# Patient Record
Sex: FEMALE | Race: BLACK OR AFRICAN AMERICAN | NOT HISPANIC OR LATINO | Employment: OTHER | URBAN - METROPOLITAN AREA
[De-identification: names, ages, dates, MRNs, and addresses within clinical notes are randomized per-mention and may not be internally consistent; named-entity substitution may affect disease eponyms.]

---

## 2017-02-07 ENCOUNTER — HISTORICAL (OUTPATIENT)
Dept: RADIOLOGY | Facility: HOSPITAL | Age: 57
End: 2017-02-07

## 2017-06-12 ENCOUNTER — HISTORICAL (OUTPATIENT)
Dept: ADMINISTRATIVE | Facility: HOSPITAL | Age: 57
End: 2017-06-12

## 2017-10-10 ENCOUNTER — HISTORICAL (OUTPATIENT)
Dept: LAB | Facility: HOSPITAL | Age: 57
End: 2017-10-10

## 2017-10-10 LAB
ABS NEUT (OLG): 7 X10(3)/MCL (ref 2.1–9.2)
BASOPHILS # BLD AUTO: 0.1 X10(3)/MCL
BASOPHILS NFR BLD AUTO: 1 % (ref 0–2)
BUN SERPL-MCNC: 9.3 MG/DL (ref 7–18)
CALCIUM SERPL-MCNC: 9.5 MG/DL (ref 8.5–10.1)
CHLORIDE SERPL-SCNC: 104 MMOL/L (ref 98–107)
CO2 SERPL-SCNC: 28 MMOL/L (ref 21–32)
CREAT SERPL-MCNC: 0.77 MG/DL (ref 0.6–1.3)
EOSINOPHIL # BLD AUTO: 0.1 X10(3)/MCL
EOSINOPHIL NFR BLD AUTO: 0 %
ERYTHROCYTE [DISTWIDTH] IN BLOOD BY AUTOMATED COUNT: 13.9 % (ref 11.5–17)
GLUCOSE SERPL-MCNC: 142 MG/DL (ref 74–106)
HCT VFR BLD AUTO: 40.8 % (ref 37–47)
HGB BLD-MCNC: 13.3 GM/DL (ref 12–16)
LYMPHOCYTES # BLD AUTO: 2.4 X10(3)/MCL
LYMPHOCYTES NFR BLD AUTO: 24 % (ref 13–40)
MCH RBC QN AUTO: 27 PG (ref 27–31)
MCHC RBC AUTO-ENTMCNC: 32.5 GM/DL (ref 33–36)
MCV RBC AUTO: 83 FL (ref 80–94)
MONOCYTES # BLD AUTO: 0.5 X10(3)/MCL
MONOCYTES NFR BLD AUTO: 5 % (ref 2–11)
NEUTROPHILS # BLD AUTO: 7 X10(3)/MCL (ref 2.1–9.2)
NEUTROPHILS NFR BLD AUTO: 69 % (ref 47–80)
PLATELET # BLD AUTO: 380 X10(3)/MCL (ref 130–400)
PMV BLD AUTO: 7.5 FL (ref 7.4–10.4)
POTASSIUM SERPL-SCNC: 3.5 MMOL/L (ref 3.5–5.1)
RBC # BLD AUTO: 4.92 X10(6)/MCL (ref 4.2–5.4)
SODIUM SERPL-SCNC: 143 MMOL/L (ref 136–145)
WBC # SPEC AUTO: 10.1 X10(3)/MCL (ref 4.5–11.5)

## 2018-01-10 ENCOUNTER — HISTORICAL (OUTPATIENT)
Dept: LAB | Facility: HOSPITAL | Age: 58
End: 2018-01-10

## 2018-01-10 ENCOUNTER — HISTORICAL (OUTPATIENT)
Dept: RADIOLOGY | Facility: HOSPITAL | Age: 58
End: 2018-01-10

## 2018-01-10 LAB
BUN SERPL-MCNC: 12 MG/DL (ref 7–18)
CALCIUM SERPL-MCNC: 9 MG/DL (ref 8.5–10.1)
CHLORIDE SERPL-SCNC: 106 MMOL/L (ref 98–107)
CO2 SERPL-SCNC: 28.2 MMOL/L (ref 21–32)
CREAT SERPL-MCNC: 0.74 MG/DL (ref 0.6–1.3)
GLUCOSE SERPL-MCNC: 158 MG/DL (ref 74–106)
POTASSIUM SERPL-SCNC: 3.7 MMOL/L (ref 3.5–5.1)
SODIUM SERPL-SCNC: 142 MMOL/L (ref 136–145)

## 2018-02-12 ENCOUNTER — HISTORICAL (OUTPATIENT)
Dept: RADIOLOGY | Facility: HOSPITAL | Age: 58
End: 2018-02-12

## 2018-06-15 ENCOUNTER — HISTORICAL (OUTPATIENT)
Dept: RADIOLOGY | Facility: HOSPITAL | Age: 58
End: 2018-06-15

## 2021-04-28 ENCOUNTER — HISTORICAL (OUTPATIENT)
Dept: RADIOLOGY | Facility: HOSPITAL | Age: 61
End: 2021-04-28

## 2022-04-08 ENCOUNTER — HISTORICAL (OUTPATIENT)
Dept: RADIOLOGY | Facility: HOSPITAL | Age: 62
End: 2022-04-08

## 2022-04-08 ENCOUNTER — HISTORICAL (OUTPATIENT)
Dept: ADMINISTRATIVE | Facility: HOSPITAL | Age: 62
End: 2022-04-08

## 2022-04-11 ENCOUNTER — HISTORICAL (OUTPATIENT)
Dept: ADMINISTRATIVE | Facility: HOSPITAL | Age: 62
End: 2022-04-11

## 2022-04-25 VITALS
SYSTOLIC BLOOD PRESSURE: 144 MMHG | DIASTOLIC BLOOD PRESSURE: 85 MMHG | OXYGEN SATURATION: 99 % | HEIGHT: 67 IN | BODY MASS INDEX: 42.6 KG/M2 | WEIGHT: 271.38 LBS

## 2023-08-05 ENCOUNTER — HOSPITAL ENCOUNTER (EMERGENCY)
Facility: HOSPITAL | Age: 63
Discharge: HOME | End: 2023-08-05
Attending: STUDENT IN AN ORGANIZED HEALTH CARE EDUCATION/TRAINING PROGRAM

## 2023-08-05 VITALS
RESPIRATION RATE: 15 BRPM | DIASTOLIC BLOOD PRESSURE: 71 MMHG | TEMPERATURE: 98 F | HEIGHT: 67 IN | SYSTOLIC BLOOD PRESSURE: 138 MMHG | WEIGHT: 200 LBS | BODY MASS INDEX: 31.39 KG/M2 | OXYGEN SATURATION: 99 % | HEART RATE: 82 BPM

## 2023-08-05 DIAGNOSIS — E86.0 DEHYDRATION: Primary | ICD-10-CM

## 2023-08-05 DIAGNOSIS — R53.1 WEAKNESS: ICD-10-CM

## 2023-08-05 LAB
ALBUMIN SERPL-MCNC: 3.8 G/DL (ref 3.4–4.8)
ALBUMIN/GLOB SERPL: 1.1 RATIO (ref 1.1–2)
ALP SERPL-CCNC: 153 UNIT/L (ref 40–150)
ALT SERPL-CCNC: 32 UNIT/L (ref 0–55)
APPEARANCE UR: CLEAR
AST SERPL-CCNC: 45 UNIT/L (ref 5–34)
BACTERIA #/AREA URNS AUTO: NORMAL /HPF
BASOPHILS # BLD AUTO: 0.04 X10(3)/MCL
BASOPHILS NFR BLD AUTO: 0.4 %
BILIRUB UR QL STRIP.AUTO: NEGATIVE
BILIRUBIN DIRECT+TOT PNL SERPL-MCNC: 0.6 MG/DL
BNP BLD-MCNC: 43.6 PG/ML
BUN SERPL-MCNC: 17.8 MG/DL (ref 9.8–20.1)
CALCIUM SERPL-MCNC: 9.2 MG/DL (ref 8.4–10.2)
CHLORIDE SERPL-SCNC: 106 MMOL/L (ref 98–107)
CO2 SERPL-SCNC: 28 MMOL/L (ref 23–31)
COLOR UR: YELLOW
CREAT SERPL-MCNC: 1.12 MG/DL (ref 0.55–1.02)
EOSINOPHIL # BLD AUTO: 0.2 X10(3)/MCL (ref 0–0.9)
EOSINOPHIL NFR BLD AUTO: 2 %
ERYTHROCYTE [DISTWIDTH] IN BLOOD BY AUTOMATED COUNT: 12.8 % (ref 11.5–17)
FLUAV AG UPPER RESP QL IA.RAPID: NOT DETECTED
FLUBV AG UPPER RESP QL IA.RAPID: NOT DETECTED
GFR SERPLBLD CREATININE-BSD FMLA CKD-EPI: 55 MLS/MIN/1.73/M2
GLOBULIN SER-MCNC: 3.5 GM/DL (ref 2.4–3.5)
GLUCOSE SERPL-MCNC: 146 MG/DL (ref 82–115)
GLUCOSE UR QL STRIP.AUTO: NEGATIVE
HCT VFR BLD AUTO: 39.7 % (ref 37–47)
HGB BLD-MCNC: 11.8 G/DL (ref 12–16)
IMM GRANULOCYTES # BLD AUTO: 0.02 X10(3)/MCL (ref 0–0.04)
IMM GRANULOCYTES NFR BLD AUTO: 0.2 %
INR PPP: 1
KETONES UR QL STRIP.AUTO: NEGATIVE
LEUKOCYTE ESTERASE UR QL STRIP.AUTO: NEGATIVE
LYMPHOCYTES # BLD AUTO: 2.09 X10(3)/MCL (ref 0.6–4.6)
LYMPHOCYTES NFR BLD AUTO: 20.4 %
MCH RBC QN AUTO: 27.4 PG (ref 27–31)
MCHC RBC AUTO-ENTMCNC: 29.7 G/DL (ref 33–36)
MCV RBC AUTO: 92.1 FL (ref 80–94)
MONOCYTES # BLD AUTO: 0.53 X10(3)/MCL (ref 0.1–1.3)
MONOCYTES NFR BLD AUTO: 5.2 %
NEUTROPHILS # BLD AUTO: 7.37 X10(3)/MCL (ref 2.1–9.2)
NEUTROPHILS NFR BLD AUTO: 71.8 %
NITRITE UR QL STRIP.AUTO: NEGATIVE
PH UR STRIP.AUTO: 7 [PH]
PLATELET # BLD AUTO: 380 X10(3)/MCL (ref 130–400)
PMV BLD AUTO: 9.6 FL (ref 7.4–10.4)
POC CARDIAC TROPONIN I: 0 NG/ML (ref 0–0.08)
POTASSIUM SERPL-SCNC: 3.3 MMOL/L (ref 3.5–5.1)
PROT SERPL-MCNC: 7.3 GM/DL (ref 5.8–7.6)
PROT UR QL STRIP.AUTO: NEGATIVE
PROTHROMBIN TIME: 10.2 SECONDS (ref 12.5–14.5)
RBC # BLD AUTO: 4.31 X10(6)/MCL (ref 4.2–5.4)
RBC #/AREA URNS AUTO: NORMAL /HPF
RBC UR QL AUTO: ABNORMAL
SAMPLE: NORMAL
SARS-COV-2 RNA RESP QL NAA+PROBE: NOT DETECTED
SODIUM SERPL-SCNC: 145 MMOL/L (ref 136–145)
SP GR UR STRIP.AUTO: 1.02
SQUAMOUS #/AREA URNS AUTO: NORMAL /HPF
UROBILINOGEN UR STRIP-ACNC: 0.2
WBC # SPEC AUTO: 10.25 X10(3)/MCL (ref 4.5–11.5)
WBC #/AREA URNS AUTO: NORMAL /HPF

## 2023-08-05 PROCEDURE — 84484 ASSAY OF TROPONIN QUANT: CPT

## 2023-08-05 PROCEDURE — 85610 PROTHROMBIN TIME: CPT | Performed by: STUDENT IN AN ORGANIZED HEALTH CARE EDUCATION/TRAINING PROGRAM

## 2023-08-05 PROCEDURE — 99285 EMERGENCY DEPT VISIT HI MDM: CPT | Mod: 25

## 2023-08-05 PROCEDURE — 25000003 PHARM REV CODE 250: Performed by: STUDENT IN AN ORGANIZED HEALTH CARE EDUCATION/TRAINING PROGRAM

## 2023-08-05 PROCEDURE — 80053 COMPREHEN METABOLIC PANEL: CPT | Performed by: STUDENT IN AN ORGANIZED HEALTH CARE EDUCATION/TRAINING PROGRAM

## 2023-08-05 PROCEDURE — 93005 ELECTROCARDIOGRAM TRACING: CPT

## 2023-08-05 PROCEDURE — 93010 EKG 12-LEAD: ICD-10-PCS | Mod: ,,, | Performed by: STUDENT IN AN ORGANIZED HEALTH CARE EDUCATION/TRAINING PROGRAM

## 2023-08-05 PROCEDURE — 96360 HYDRATION IV INFUSION INIT: CPT

## 2023-08-05 PROCEDURE — 93010 ELECTROCARDIOGRAM REPORT: CPT | Mod: ,,, | Performed by: STUDENT IN AN ORGANIZED HEALTH CARE EDUCATION/TRAINING PROGRAM

## 2023-08-05 PROCEDURE — 81001 URINALYSIS AUTO W/SCOPE: CPT | Performed by: STUDENT IN AN ORGANIZED HEALTH CARE EDUCATION/TRAINING PROGRAM

## 2023-08-05 PROCEDURE — 83880 ASSAY OF NATRIURETIC PEPTIDE: CPT | Performed by: STUDENT IN AN ORGANIZED HEALTH CARE EDUCATION/TRAINING PROGRAM

## 2023-08-05 PROCEDURE — 85025 COMPLETE CBC W/AUTO DIFF WBC: CPT | Performed by: STUDENT IN AN ORGANIZED HEALTH CARE EDUCATION/TRAINING PROGRAM

## 2023-08-05 PROCEDURE — 0240U COVID/FLU A&B PCR: CPT | Performed by: STUDENT IN AN ORGANIZED HEALTH CARE EDUCATION/TRAINING PROGRAM

## 2023-08-05 RX ADMIN — SODIUM CHLORIDE 1000 ML: 9 INJECTION, SOLUTION INTRAVENOUS at 02:08

## 2023-08-05 NOTE — ED NOTES
Resting comfortably - spouse at bedside- cm sr no ectopy- free of nv- resps even/unlabored.  Pt voided for ua - results pending.

## 2023-08-05 NOTE — Clinical Note
"Barbara"Rylee Alvarez was seen and treated in our emergency department on 8/5/2023.  She may return to work on 08/08/2023.       If you have any questions or concerns, please don't hesitate to call.      Ingrid Olivera MD"

## 2023-08-05 NOTE — ED PROVIDER NOTES
Encounter Date: 8/5/2023       History     Chief Complaint   Patient presents with    Weakness     Pt was shopping at [employer], states she got really hot and then got weak; pt states it was hot in [employer]; also reports nausea at this time; cbg with ems 130      63-year-old female presents to EMS after having an episode in [employer] just prior to arrival states that she got really hot and weak while standing talking to a friend.  CBG with   States that it was very hot in the store.  Patient also states that she cut grass on yesterday may not have drank as much water as she thinks she should have.  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  Had mild nausea initially currently denies any nausea vomiting chest pain shortness of breath or any other symptoms.      Review of patient's allergies indicates:  No Known Allergies  History reviewed. No pertinent past medical history.  History reviewed. No pertinent surgical history.  History reviewed. No pertinent family history.     Review of Systems   Constitutional:  Negative for fever.   HENT:  Negative for sore throat.    Respiratory:  Negative for shortness of breath.    Cardiovascular:  Negative for chest pain.   Gastrointestinal:  Negative for nausea.   Genitourinary:  Negative for dysuria.   Musculoskeletal:  Negative for back pain.   Skin:  Negative for rash.   Neurological:  Positive for weakness.        Neg except as stated   Hematological:  Does not bruise/bleed easily.       Physical Exam     Initial Vitals [08/05/23 1156]   BP Pulse Resp Temp SpO2   103/70 70 18 98 °F (36.7 °C) 95 %      MAP       --         Physical Exam    Nursing note and vitals reviewed.  Constitutional: She appears well-developed and well-nourished.   HENT:   Head: Normocephalic.   Eyes: EOM are normal. Pupils are equal, round, and reactive to light.   Neck:   Normal range of motion.  Cardiovascular:   Normal rate, regular rhythm, normal heart sounds, intact distal pulses and normal pulses.           Pulmonary/Chest: Breath sounds normal. No respiratory distress.   Abdominal: Abdomen is soft. Bowel sounds are normal. There is no abdominal tenderness.   Musculoskeletal:         General: Normal range of motion.      Cervical back: Normal range of motion.     Neurological: She is alert and oriented to person, place, and time. GCS score is 15. GCS eye subscore is 4. GCS verbal subscore is 5. GCS motor subscore is 6.   Skin: Skin is warm. Capillary refill takes less than 2 seconds.   Psychiatric: She has a normal mood and affect.         ED Course   Procedures  Labs Reviewed   COMPREHENSIVE METABOLIC PANEL - Abnormal; Notable for the following components:       Result Value    Potassium Level 3.3 (*)     Glucose Level 146 (*)     Creatinine 1.12 (*)     Alkaline Phosphatase 153 (*)     Aspartate Aminotransferase 45 (*)     All other components within normal limits   URINALYSIS, REFLEX TO URINE CULTURE - Abnormal; Notable for the following components:    Blood, UA Trace-Intact (*)     All other components within normal limits   PROTIME-INR - Abnormal; Notable for the following components:    PT 10.2 (*)     All other components within normal limits   CBC WITH DIFFERENTIAL - Abnormal; Notable for the following components:    Hgb 11.8 (*)     MCHC 29.7 (*)     All other components within normal limits   B-TYPE NATRIURETIC PEPTIDE - Normal   COVID/FLU A&B PCR - Normal    Narrative:     The Xpert Xpress SARS-CoV-2/FLU/RSV plus is a rapid, multiplexed real-time PCR test intended for the simultaneous qualitative detection and differentiation of SARS-CoV-2, Influenza A, Influenza B, and respiratory syncytial virus (RSV) viral RNA in either nasopharyngeal swab or nasal swab specimens.         URINALYSIS, MICROSCOPIC - Normal   CBC W/ AUTO DIFFERENTIAL    Narrative:     The following orders were created for panel order CBC Auto  Differential.  Procedure                               Abnormality         Status                     ---------                               -----------         ------                     CBC with Differential[[id number]]        Abnormal            Final result                 Please view results for these tests on the individual orders.   TROPONIN ISTAT   POCT TROPONIN          Imaging Results              X-Ray Chest 1 View (Final result)  Result time 08/05/23 13:14:40      Final result by Sergio Jackson MD (08/05/23 13:14:40)                   Impression:      No acute cardiopulmonary abnormality.      Electronically signed by: Sergio Jackson MD  Date:    08/05/2023  Time:    13:14               Narrative:    EXAMINATION:  Single view chest radiograph.    CLINICAL HISTORY:  Weakness    TECHNIQUE:  Single view of the chest.    COMPARISON:  Chest radiograph 02/12/2018.    FINDINGS:  The lungs are clear without consolidation or effusion.  There is no pneumothorax.  The cardiac silhouette is normal in size.  There is no acute osseous abnormality.                                    X-Rays:   Independently Interpreted Readings:   Chest X-Ray: No acute abnormalities.     Medications   sodium chloride 0.9% bolus 1,000 mL 1,000 mL (1,000 mLs Intravenous New Bag 8/5/23 1400)     Medical Decision Making:   History:   Old Records Summarized: records from clinic visits, records from previous admission(s) and records from another hospital.  Differential Diagnosis:   Dehydration, electrolyte imbalance, ACS, arrhythmia, vasovagal episode  Independently Interpreted Test(s):   I have ordered and independently interpreted X-rays - see prior notes.  I have ordered and independently interpreted EKG Reading(s) - see prior notes  Clinical Tests:   Lab Tests: Ordered and Reviewed       <> Summary of Lab: Creatinine elevated 1.12  K 3.3  Radiological Study: Ordered and Reviewed  Medical Tests: Ordered and Reviewed  ED Management:  Patient  given 1 L normal saline IV fluids with improvement of symptoms.  Peers at symptoms likely due to dehydration due to decreased fluid intake coupled with patient being exposed to heat the past few days to a week.  Patient is asymptomatic on time of examined at discharge no apparent distress vital signs stable.  Advised to increase p.o. hydration with electrolyte rich fluids and stay out of the heat as much as possible.  Follow up with PCP  ER precautions reviewed  The patient is resting comfortably in no acute distress.  She is hemodynamically stable and is without objective evidence for acute process requiring urgent intervention or hospitalization. I provided counseling to patient with regard to condition, the treatment plan, specific conditions for return, and the importance of follow up. Detailed written and verbal instructions provided to patient and he expressed a verbal understanding of the discharge instructions and overall management plan. Reiterated the importance of medication administration and safety and advised patient to follow up with primary care provider in 3-5 days or sooner if needed.  Answered questions at this time. The patient is stable for discharge.                ED Course as of 08/05/23 1450   Sat Aug 05, 2023   1257 EKG normal sinus rhythm sinus arrhythmia rate 60 LVH nonspecific T-wave abnormalities prolonged QT no STEMI [MG]      ED Course User Index  [MG] Ingrid Olivera MD                 Clinical Impression:   Final diagnoses:  [R53.1] Weakness  [E86.0] Dehydration (Primary)        ED Disposition Condition    Discharge Stable          ED Prescriptions    None       Follow-up Information       Follow up With Specialties Details Why Contact Info    PCP  Schedule an appointment as soon as possible for a visit                Ingrid Olivera MD  08/05/23 3127

## 2024-04-18 DIAGNOSIS — Z12.31 SCREENING MAMMOGRAM FOR BREAST CANCER: ICD-10-CM

## 2024-04-18 DIAGNOSIS — N95.9 MENOPAUSAL PROBLEM: Primary | ICD-10-CM

## 2024-04-26 ENCOUNTER — HOSPITAL ENCOUNTER (OUTPATIENT)
Dept: RADIOLOGY | Facility: HOSPITAL | Age: 64
Discharge: HOME | End: 2024-04-26
Attending: NURSE PRACTITIONER
Payer: MEDICARE

## 2024-04-26 DIAGNOSIS — N95.9 MENOPAUSAL PROBLEM: ICD-10-CM

## 2024-04-26 PROCEDURE — 77067 SCR MAMMO BI INCL CAD: CPT | Mod: 26,,, | Performed by: RADIOLOGY

## 2024-04-26 PROCEDURE — 77080 DXA BONE DENSITY AXIAL: CPT | Mod: TC

## 2024-04-26 PROCEDURE — 77067 SCR MAMMO BI INCL CAD: CPT | Mod: TC

## 2024-04-26 PROCEDURE — 77063 BREAST TOMOSYNTHESIS BI: CPT | Mod: 26,,, | Performed by: RADIOLOGY

## 2024-06-10 ENCOUNTER — HOSPITAL ENCOUNTER (OUTPATIENT)
Dept: RADIOLOGY | Facility: HOSPITAL | Age: 64
Discharge: HOME | End: 2024-06-10
Attending: NURSE PRACTITIONER
Payer: MEDICARE

## 2024-06-10 DIAGNOSIS — R92.8 ABNORMAL MAMMOGRAM: ICD-10-CM

## 2024-06-10 DIAGNOSIS — R92.8 ABNORMAL FINDINGS ON DIAGNOSTIC IMAGING OF BREAST: Primary | ICD-10-CM

## 2024-06-10 PROCEDURE — 77061 BREAST TOMOSYNTHESIS UNI: CPT | Mod: 26,LT,, | Performed by: RADIOLOGY

## 2024-06-10 PROCEDURE — 77065 DX MAMMO INCL CAD UNI: CPT | Mod: TC,LT

## 2024-06-10 PROCEDURE — 76641 ULTRASOUND BREAST COMPLETE: CPT | Mod: TC,LT

## 2024-06-10 PROCEDURE — 77061 BREAST TOMOSYNTHESIS UNI: CPT | Mod: TC,LT

## 2024-06-10 PROCEDURE — 77065 DX MAMMO INCL CAD UNI: CPT | Mod: 26,LT,, | Performed by: RADIOLOGY

## 2024-06-20 ENCOUNTER — HOSPITAL ENCOUNTER (OUTPATIENT)
Dept: RADIOLOGY | Facility: HOSPITAL | Age: 64
Discharge: HOME | End: 2024-06-20
Attending: NURSE PRACTITIONER
Payer: MEDICARE

## 2024-06-20 DIAGNOSIS — R92.8 ABNORMAL FINDINGS ON DIAGNOSTIC IMAGING OF BREAST: Primary | ICD-10-CM

## 2024-06-20 DIAGNOSIS — R92.8 ABNORMAL FINDINGS ON DIAGNOSTIC IMAGING OF BREAST: ICD-10-CM

## 2024-06-20 PROCEDURE — 27000550 US BREAST BIOPSY WITH IMAGING 1ST SITE LEFT

## 2024-06-20 PROCEDURE — 77061 BREAST TOMOSYNTHESIS UNI: CPT | Mod: TC,LT

## 2024-06-25 ENCOUNTER — TELEPHONE (OUTPATIENT)
Dept: RADIOLOGY | Facility: HOSPITAL | Age: 64
End: 2024-06-25
Payer: MEDICARE

## 2024-06-25 DIAGNOSIS — D05.12 DUCTAL CARCINOMA IN SITU (DCIS) OF LEFT BREAST: Primary | ICD-10-CM

## 2024-07-08 ENCOUNTER — TELEPHONE (OUTPATIENT)
Dept: SURGERY | Facility: CLINIC | Age: 64
End: 2024-07-08
Payer: MEDICARE

## 2024-07-08 NOTE — H&P (VIEW-ONLY)
[facility] Breast Surg  Breast Surgical Oncology  New Patient Office Visit - H&P      Referring Provider: Antoinette Elizondo   PCP: Antoinette Elizondo NP     Chief Complaint:   Chief Complaint   Patient presents with    Breast Cancer     Patient reports no breast related concerns        Subjective:       HPI:  Barbara Alvarez is a 64 y.o. female who presents on 7/9/2024 for evaluation of newly diagnosed left  breast cancer. Biopsy of left breast mass positive for DCIS    A detailed patient history was obtained and reviewed. She currently denies any breast issues including rashes, redness, pain, swelling, nipple discharge, or new lumps/masses.    Of note, pt developed a blood clot after her first pregnancy and was taken off birth control medications. She had an IVC filter placed and is currently taking Plavix. She see Antoinette Elizondo NP for management of her Plavix.    MG breast density: Category B (scattered areas of fibroglandular tissue)     Imaging:  Bilateral screening mammogram 4/29/2024:   IMPRESSION: INCOMPLETE: NEEDS ADDITIONAL IMAGING EVALUATION  The oval mass in the 12:00 left breast needs further evaluation.      Left diagnostic mammogram 6/10/2024:   IMPRESSION: SUSPICIOUS OF MALIGNANCY  Left breast 11:00 10 cm from the nipple posterior depth 0.7 cm irregular hypoechoic mass with angular/spiculated margins is suspicious for malignancy. BIRADS 4    Left breast US guided biopsy 7/1/2024:   IMPRESSION: ULTRASOUND GUIDED BIOPSY MALIGNANT   Ultrasound guided biopsy of the 0.7 cm mass in the left breast at 11:00 posterior depth 10 cm from the nipple with placement of a HydroMARK T3 coil-shaped clip was successful with no apparent post procedure complications.    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  Pathology results are concordant with mammography and ultrasound findings.           Pathology:    Left breast  mass at 11:00 10 CMFN, US biopsy: DCIS, G2, predominately cribriform and papillary types, 0.4 cm in greatest dimension. ER 90-95%, OR 70-75%    OB/GYN History:  Age at Menarche Onset: 11  Menopausal Status: postmenopausal, LMP: at age 49  Hysterectomy/Oophorectomy: hysterectomy without BSO, at age 22  Hormonal birth control (duration): Yes OCP (estrogen/progesterone). started at age 15 and stopped at age 18 due to blood clot  Pregnancy History:   Age at first live birth: 18  Hormone Replacement Therapy: No, none  Patient did not breast feed.  Patient denies nipple discharge.   Patient denies to previous breast biopsy.   Patient denies to a personal history of breast cancer.        Other Relevant History:  Prior thoracic RT: none  Genetic testing: No  Ashkenazi Evangelical descent: No    Family History:  Mother- Cancer, unknown type  mUncle- Cancer, bone  mCousin- Cancer, bone  Brother- Cancer, colon  Son- Cancer, brain    Past History:  Past Medical History:   Diagnosis Date    Depression     Hypertension         Past Surgical History:   Procedure Laterality Date    APPENDECTOMY      CHOLECYSTECTOMY      HYSTERECTOMY      KNEE SURGERY Left     NECK SURGERY      PLACEMENT, INFERIOR VENA CAVA FILTER Left     SHOULDER ARTHROSCOPY W/ ROTATOR CUFF REPAIR Left     STENT PLACEMENT/PRIOR TO CALIN Left 2015    STENT PLACEMENT/PRIOR TO CALIN Left         Social History     Socioeconomic History    Marital status:    Tobacco Use    Smoking status: Never    Smokeless tobacco: Never   Substance and Sexual Activity    Alcohol use: Never    Drug use: Never     Social Determinants of Health     Financial Resource Strain: Low Risk  (2023)    Received from [employer] [facility] and Its Subsidiaries and Affiliates    Overall Financial Resource Strain (CARDIA)     Difficulty of Paying Living Expenses: Not hard at all   Food Insecurity: No Food Insecurity (2023)    Received from [employer]  [facility] and Its Subsidiaries and Affiliates    Hunger Vital Sign     Worried About Running Out of Food in the Last Year: Never true     Ran Out of Food in the Last Year: Never true        Body mass index is 35.05 kg/m².     Allergy/Medications:   Review of patient's allergies indicates:   Allergen Reactions    Amitriptyline Other (See Comments)     Other Reaction(s): muscle tightening    MUSCLE WEAKNESS    Adhesive Rash    Enoxaparin Rash    Methocarbamol Rash and Other (See Comments)     MUSCLE WEAKNESS    Rivaroxaban Rash    Warfarin Rash          Current Outpatient Medications:     acetaminophen-codeine 300-30mg (TYLENOL #3) 300-30 mg Tab, Take 1 tablet by mouth., Disp: , Rfl:     amLODIPine (NORVASC) 10 MG tablet, Take 10 mg by mouth., Disp: , Rfl:     buPROPion (WELLBUTRIN XL) 300 MG 24 hr tablet, TAKE 1 TABLET BY MOUTH EVERY MORNING FOR DEPRESSION, Disp: , Rfl:     clopidogreL (PLAVIX) 75 mg tablet, , Disp: , Rfl:     furosemide (LASIX) 40 MG tablet, , Disp: , Rfl:     irbesartan (AVAPRO) 300 MG tablet, Take 300 mg by mouth., Disp: , Rfl:          Review of Systems:  Review of Systems   Constitutional:  Positive for malaise/fatigue. Negative for chills, fever and weight loss.   HENT:  Negative for sore throat.    Eyes:  Negative for blurred vision and double vision.   Respiratory:  Negative for cough and shortness of breath.    Cardiovascular:  Negative for chest pain, palpitations and leg swelling.   Gastrointestinal:  Positive for heartburn. Negative for abdominal pain, constipation, diarrhea, nausea and vomiting.   Genitourinary:  Negative for dysuria, flank pain, frequency, hematuria and urgency.   Musculoskeletal:  Negative for back pain, joint pain and myalgias.        Left knee pain   Neurological:  Negative for dizziness and headaches.   Endo/Heme/Allergies:  Bruises/bleeds easily.   Psychiatric/Behavioral:  Negative for depression. The patient is not nervous/anxious.   "         Objective:     Vitals:  Blood pressure (!) 146/85, pulse 76, temperature 97.5 °F (36.4 °C), temperature source Oral, resp. rate 18, height 5' 7" (1.702 m), weight 101.5 kg (223 lb 12.8 oz), SpO2 98%.      Physical Exam:  General: The patient is awake, alert and oriented times three. The patient is well nourished and in no acute distress.   Neck: There is no evidence of palpable cervical, supraclavicular or axillary adenopathy. The neck is supple. The thyroid is not enlarged.   Musculoskeletal: The patient has a normal range of motion of her bilateral upper extremities.   Chest: Examination of the chest wall fails to reveal any obvious abnormalities. The lungs are clear to auscultation bilaterally without rales, rhonchi, or wheezing.   Cardiovascular: The heart has a regular rate and rhythm without murmurs, gallops or rubs.  Breast:  Right: Examination of right breast fails to reveal any dominant masses or areas of significant focal nodularity. The nipple is everted without evidence of discharge. There is no skin dimpling with movement of the pectoralis. There are no significant skin changes overlying the breast.   Left: Examination of the left breast fails to reveal any dominant masses or areas of significant focal nodularity. The nipple is everted without evidence of discharge. There is no skin dimpling with movement of the pectoralis. There are no significant skin changes overlying the breast.  Abdomen: The abdomen is soft, flat, nontender and nondistended with no palpable masses or organomegaly.  Integumentary: no rashes or skin lesions present  Neurologic: cranial nerves intact, no signs of peripheral neurological deficit, motor/sensory function intact        Assessment and Discussion:      Cancer Staging   Ductal carcinoma in situ (DCIS) of left breast  Staging form: Breast, AJCC 8th Edition  - Clinical stage from 7/9/2024: Stage 0 (cTis (DCIS), cN0, cM0, ER+, WY+, HER2: Not Assessed) - Signed by " Deirdre Welch MD on 7/9/2024        Encounter Diagnoses   Name Primary?    Ductal carcinoma in situ (DCIS) of left breast         I explained the pathology report of DCIS (ductal carcinoma in-situ) to the patient. This is a noninvasive breast cancer, which means it is still confined to the ducts and not invading the surrounding tissue. We then discussed the need for LOCAL TREATMENT and ADJUVANT HORMONAL TREATMENT.    The LOCAL TREATMENT consists of the options of 1) partial mastectomy (lumpectomy) with radiation or 2) total mastectomy alone.    I then explained to her the procedure of lumpectomy. The rationale for lumpectomy and the need to obtain clear margins, which includes a minimal acceptable margin of at least 2 mm in all directions, were specifically addressed. The necessity of adding radiation following lumpectomy with good margins was explained. The logistics of radiation were discussed at length. The patient will be referred to Radiation Oncology to discuss further details of radiation.    The general operative procedure of mastectomy, the skin sparing nature, and attempts to preserve underlying muscle with a mastectomy were discussed. The options of primary reconstruction following a mastectomy include either implant reconstruction or tissue transfer type of reconstruction. The pros and cons of both of these reconstructive methods were addressed. Both of these are generally performed in stages, and a second operation is usually necessary before the final completion of the reconstructive process. I also explained to the patient that the reconstructive options are generally, by law, required to be covered by insurance and would be considered part of a cancer operation and not a cosmetic operation.     Sometimes also a reduction or mastopexy is performed on the opposite side to achieve a better match, and this operation by itself is also considered part of the cancer treatment.     I informed her of the  complications which include surgical site infections, hematoma or seroma requiring operation, necrosis of nipple-areola and/or mastectomy flaps requiring debridement or hyperbaric therapy, unplanned re-operations, and delay in adjuvant treatments.     Specifically, we went over the local recurrence rate and survival rates with mastectomy and breast conserving therapy, and the indications for postmastectomy radiation in certain subset of patients. If she were to choose the option of mastectomy, then a lymph node mapping procedure would need to be done at that same time. We discussed SLN biopsy in detail and its rationale. If any invasive cancer component is found on lumpectomy, then we would need to stage her lymph nodes with sentinel lymph node mapping. The sentinel lymph node biopsy is generally performed at the time of mastectomy given the breast tissue will be removed and would make lymph node biopsy essentially not possible. If not performed at the time of mastectomy and invasive component found on final pathology an axillary lymph node dissection is generally performed. MagTrace can be used at the time of mastectomy to allow for delayed sentinel lymph node biopsy.     Following this, I have also briefly discussed with her the rationale for ADJUVANT HORMONAL THERAPY. I do not think she will need any chemotherapy, as DCIS is a noninvasive cancer. However, if invasive component is present in the final pathology, details would be needed to determined prior to recommendations for or against chemotherapy.         Plan:       Agree with note written by General Surgery Resident, Moise Amezcua MD, [facility] General Surgery PGY-1.    Problem List Items Addressed This Visit          Oncology    Ductal carcinoma in situ (DCIS) of left breast    Current Assessment & Plan     Surgery - Left breast partial mastectomy/lumpectomy with seed localization  Tentative Date: 7/22/2024  Preop - CBC, CMP, EKG  Surgical instructions and  information were discussed in detail         Relevant Orders    Case Request Operating Room: MASTECTOMY, PARTIAL - LEFT (Completed)    Comprehensive metabolic panel (Completed)    CBC auto differential (Completed)    EKG 12-lead         All of questions were answered    Deirdre Welch MD  Breast Surgical Oncology     OFFICE VISIT CODING:    Non-face-to-face time included:  Yes Preparing to see the patient such as reviewing the patient record  Yes Obtaining and reviewing separately obtained history  Yes Independently interpreting results  Yes Documenting clinical information in electronic health record  Yes Ordering appropriate medications  Yes Ordering appropriate tests  Yes Ordering appropriate procedures (including follow-up)  Yes Referring and communicating with other health care professionals (not separately reported)  Yes Care Coordination (not separately reported)    Face-to-face time included:  Yes Performing a medically necessary appropriate history, examination, and/or evaluation  Yes Communicating results to the patient/family/caregiver  Yes Counseling and educating the patient/family/caregiver  Yes Answering patient/family/caregiver questions    Total Time: 65 minutes    Total time includes both face-to-face and non-face-to-face time personally spent by myself on the day of the visit.

## 2024-07-08 NOTE — PROGRESS NOTES
[facility] Breast Surg  Breast Surgical Oncology  New Patient Office Visit - H&P      Referring Provider: Antoinette Elizondo   PCP: Antoinette Elizondo NP     Chief Complaint:   Chief Complaint   Patient presents with    Breast Cancer     Patient reports no breast related concerns        Subjective:       HPI:  Barbara Alvarez is a 64 y.o. female who presents on 7/9/2024 for evaluation of newly diagnosed left  breast cancer. Biopsy of left breast mass positive for DCIS    A detailed patient history was obtained and reviewed. She currently denies any breast issues including rashes, redness, pain, swelling, nipple discharge, or new lumps/masses.    Of note, pt developed a blood clot after her first pregnancy and was taken off birth control medications. She had an IVC filter placed and is currently taking Plavix. She see Antoinette Elizondo NP for management of her Plavix.    MG breast density: Category B (scattered areas of fibroglandular tissue)     Imaging:  Bilateral screening mammogram 4/29/2024:   IMPRESSION: INCOMPLETE: NEEDS ADDITIONAL IMAGING EVALUATION  The oval mass in the 12:00 left breast needs further evaluation.      Left diagnostic mammogram 6/10/2024:   IMPRESSION: SUSPICIOUS OF MALIGNANCY  Left breast 11:00 10 cm from the nipple posterior depth 0.7 cm irregular hypoechoic mass with angular/spiculated margins is suspicious for malignancy. BIRADS 4    Left breast US guided biopsy 7/1/2024:   IMPRESSION: ULTRASOUND GUIDED BIOPSY MALIGNANT   Ultrasound guided biopsy of the 0.7 cm mass in the left breast at 11:00 posterior depth 10 cm from the nipple with placement of a HydroMARK T3 coil-shaped clip was successful with no apparent post procedure complications.    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  Pathology results are concordant with mammography and ultrasound findings.           Pathology:    Left breast  mass at 11:00 10 CMFN, US biopsy: DCIS, G2, predominately cribriform and papillary types, 0.4 cm in greatest dimension. ER 90-95%, SC 70-75%    OB/GYN History:  Age at Menarche Onset: 11  Menopausal Status: postmenopausal, LMP: at age 49  Hysterectomy/Oophorectomy: hysterectomy without BSO, at age 22  Hormonal birth control (duration): Yes OCP (estrogen/progesterone). started at age 15 and stopped at age 18 due to blood clot  Pregnancy History:   Age at first live birth: 18  Hormone Replacement Therapy: No, none  Patient did not breast feed.  Patient denies nipple discharge.   Patient denies to previous breast biopsy.   Patient denies to a personal history of breast cancer.        Other Relevant History:  Prior thoracic RT: none  Genetic testing: No  Ashkenazi Yazidi descent: No    Family History:  Mother- Cancer, unknown type  mUncle- Cancer, bone  mCousin- Cancer, bone  Brother- Cancer, colon  Son- Cancer, brain    Past History:  Past Medical History:   Diagnosis Date    Depression     Hypertension         Past Surgical History:   Procedure Laterality Date    APPENDECTOMY      CHOLECYSTECTOMY      HYSTERECTOMY      KNEE SURGERY Left     NECK SURGERY      PLACEMENT, INFERIOR VENA CAVA FILTER Left     SHOULDER ARTHROSCOPY W/ ROTATOR CUFF REPAIR Left     STENT PLACEMENT/PRIOR TO CALIN Left 2015    STENT PLACEMENT/PRIOR TO CALIN Left         Social History     Socioeconomic History    Marital status:    Tobacco Use    Smoking status: Never    Smokeless tobacco: Never   Substance and Sexual Activity    Alcohol use: Never    Drug use: Never     Social Determinants of Health     Financial Resource Strain: Low Risk  (2023)    Received from [employer] [facility]    Overall Financial Resource Strain (CARDIA)     Difficulty of Paying Living Expenses: Not hard at all   Food Insecurity: No Food Insecurity (2023)    Received from [employer]  [facility]    Hunger Vital Sign     Worried About Running Out of Food in the Last Year: Never true     Ran Out of Food in the Last Year: Never true        Body mass index is 35.05 kg/m².     Allergy/Medications:   Review of patient's allergies indicates:   Allergen Reactions    Amitriptyline Other (See Comments)     Other Reaction(s): muscle tightening    MUSCLE WEAKNESS    Adhesive Rash    Enoxaparin Rash    Methocarbamol Rash and Other (See Comments)     MUSCLE WEAKNESS    Rivaroxaban Rash    Warfarin Rash          Current Outpatient Medications:     acetaminophen-codeine 300-30mg (TYLENOL #3) 300-30 mg Tab, Take 1 tablet by mouth., Disp: , Rfl:     amLODIPine (NORVASC) 10 MG tablet, Take 10 mg by mouth., Disp: , Rfl:     buPROPion (WELLBUTRIN XL) 300 MG 24 hr tablet, TAKE 1 TABLET BY MOUTH EVERY MORNING FOR DEPRESSION, Disp: , Rfl:     clopidogreL (PLAVIX) 75 mg tablet, , Disp: , Rfl:     furosemide (LASIX) 40 MG tablet, , Disp: , Rfl:     irbesartan (AVAPRO) 300 MG tablet, Take 300 mg by mouth., Disp: , Rfl:          Review of Systems:  Review of Systems   Constitutional:  Positive for malaise/fatigue. Negative for chills, fever and weight loss.   HENT:  Negative for sore throat.    Eyes:  Negative for blurred vision and double vision.   Respiratory:  Negative for cough and shortness of breath.    Cardiovascular:  Negative for chest pain, palpitations and leg swelling.   Gastrointestinal:  Positive for heartburn. Negative for abdominal pain, constipation, diarrhea, nausea and vomiting.   Genitourinary:  Negative for dysuria, flank pain, frequency, hematuria and urgency.   Musculoskeletal:  Negative for back pain, joint pain and myalgias.        Left knee pain   Neurological:  Negative for dizziness and headaches.   Endo/Heme/Allergies:  Bruises/bleeds easily.   Psychiatric/Behavioral:  Negative for depression. The patient is not nervous/anxious.   "         Objective:     Vitals:  Blood pressure (!) 146/85, pulse 76, temperature 97.5 °F (36.4 °C), temperature source Oral, resp. rate 18, height 5' 7" (1.702 m), weight 101.5 kg (223 lb 12.8 oz), SpO2 98%.      Physical Exam:  General: The patient is awake, alert and oriented times three. The patient is well nourished and in no acute distress.   Neck: There is no evidence of palpable cervical, supraclavicular or axillary adenopathy. The neck is supple. The thyroid is not enlarged.   Musculoskeletal: The patient has a normal range of motion of her bilateral upper extremities.   Chest: Examination of the chest wall fails to reveal any obvious abnormalities. The lungs are clear to auscultation bilaterally without rales, rhonchi, or wheezing.   Cardiovascular: The heart has a regular rate and rhythm without murmurs, gallops or rubs.  Breast:  Right: Examination of right breast fails to reveal any dominant masses or areas of significant focal nodularity. The nipple is everted without evidence of discharge. There is no skin dimpling with movement of the pectoralis. There are no significant skin changes overlying the breast.   Left: Examination of the left breast fails to reveal any dominant masses or areas of significant focal nodularity. The nipple is everted without evidence of discharge. There is no skin dimpling with movement of the pectoralis. There are no significant skin changes overlying the breast.  Abdomen: The abdomen is soft, flat, nontender and nondistended with no palpable masses or organomegaly.  Integumentary: no rashes or skin lesions present  Neurologic: cranial nerves intact, no signs of peripheral neurological deficit, motor/sensory function intact        Assessment and Discussion:      Cancer Staging   Ductal carcinoma in situ (DCIS) of left breast  Staging form: Breast, AJCC 8th Edition  - Clinical stage from 7/9/2024: Stage 0 (cTis (DCIS), cN0, cM0, ER+, IA+, HER2: Not Assessed) - Signed by " Deirdre Welch MD on 7/9/2024        Encounter Diagnoses   Name Primary?    Ductal carcinoma in situ (DCIS) of left breast         I explained the pathology report of DCIS (ductal carcinoma in-situ) to the patient. This is a noninvasive breast cancer, which means it is still confined to the ducts and not invading the surrounding tissue. We then discussed the need for LOCAL TREATMENT and ADJUVANT HORMONAL TREATMENT.    The LOCAL TREATMENT consists of the options of 1) partial mastectomy (lumpectomy) with radiation or 2) total mastectomy alone.    I then explained to her the procedure of lumpectomy. The rationale for lumpectomy and the need to obtain clear margins, which includes a minimal acceptable margin of at least 2 mm in all directions, were specifically addressed. The necessity of adding radiation following lumpectomy with good margins was explained. The logistics of radiation were discussed at length. The patient will be referred to Radiation Oncology to discuss further details of radiation.    The general operative procedure of mastectomy, the skin sparing nature, and attempts to preserve underlying muscle with a mastectomy were discussed. The options of primary reconstruction following a mastectomy include either implant reconstruction or tissue transfer type of reconstruction. The pros and cons of both of these reconstructive methods were addressed. Both of these are generally performed in stages, and a second operation is usually necessary before the final completion of the reconstructive process. I also explained to the patient that the reconstructive options are generally, by law, required to be covered by insurance and would be considered part of a cancer operation and not a cosmetic operation.     Sometimes also a reduction or mastopexy is performed on the opposite side to achieve a better match, and this operation by itself is also considered part of the cancer treatment.     I informed her of the  complications which include surgical site infections, hematoma or seroma requiring operation, necrosis of nipple-areola and/or mastectomy flaps requiring debridement or hyperbaric therapy, unplanned re-operations, and delay in adjuvant treatments.     Specifically, we went over the local recurrence rate and survival rates with mastectomy and breast conserving therapy, and the indications for postmastectomy radiation in certain subset of patients. If she were to choose the option of mastectomy, then a lymph node mapping procedure would need to be done at that same time. We discussed SLN biopsy in detail and its rationale. If any invasive cancer component is found on lumpectomy, then we would need to stage her lymph nodes with sentinel lymph node mapping. The sentinel lymph node biopsy is generally performed at the time of mastectomy given the breast tissue will be removed and would make lymph node biopsy essentially not possible. If not performed at the time of mastectomy and invasive component found on final pathology an axillary lymph node dissection is generally performed. MagTrace can be used at the time of mastectomy to allow for delayed sentinel lymph node biopsy.     Following this, I have also briefly discussed with her the rationale for ADJUVANT HORMONAL THERAPY. I do not think she will need any chemotherapy, as DCIS is a noninvasive cancer. However, if invasive component is present in the final pathology, details would be needed to determined prior to recommendations for or against chemotherapy.         Plan:       Agree with note written by General Surgery Resident, Moise Amezcua MD, [facility] General Surgery PGY-1.    Problem List Items Addressed This Visit          Oncology    Ductal carcinoma in situ (DCIS) of left breast    Current Assessment & Plan     Surgery - Left breast partial mastectomy/lumpectomy with seed localization  Tentative Date: 7/22/2024  Preop - CBC, CMP, EKG  Surgical instructions and  information were discussed in detail         Relevant Orders    Case Request Operating Room: MASTECTOMY, PARTIAL - LEFT (Completed)    Comprehensive metabolic panel (Completed)    CBC auto differential (Completed)    EKG 12-lead         All of questions were answered    Deirdre Welch MD  Breast Surgical Oncology     OFFICE VISIT CODING:    Non-face-to-face time included:  Yes Preparing to see the patient such as reviewing the patient record  Yes Obtaining and reviewing separately obtained history  Yes Independently interpreting results  Yes Documenting clinical information in electronic health record  Yes Ordering appropriate medications  Yes Ordering appropriate tests  Yes Ordering appropriate procedures (including follow-up)  Yes Referring and communicating with other health care professionals (not separately reported)  Yes Care Coordination (not separately reported)    Face-to-face time included:  Yes Performing a medically necessary appropriate history, examination, and/or evaluation  Yes Communicating results to the patient/family/caregiver  Yes Counseling and educating the patient/family/caregiver  Yes Answering patient/family/caregiver questions    Total Time: 65 minutes    Total time includes both face-to-face and non-face-to-face time personally spent by myself on the day of the visit.

## 2024-07-09 ENCOUNTER — LAB VISIT (OUTPATIENT)
Dept: LAB | Facility: HOSPITAL | Age: 64
End: 2024-07-09
Attending: SURGERY
Payer: MEDICARE

## 2024-07-09 ENCOUNTER — OFFICE VISIT (OUTPATIENT)
Dept: SURGERY | Facility: CLINIC | Age: 64
End: 2024-07-09
Payer: MEDICARE

## 2024-07-09 VITALS
TEMPERATURE: 98 F | WEIGHT: 223.81 LBS | BODY MASS INDEX: 35.13 KG/M2 | DIASTOLIC BLOOD PRESSURE: 85 MMHG | RESPIRATION RATE: 18 BRPM | HEIGHT: 67 IN | OXYGEN SATURATION: 98 % | HEART RATE: 76 BPM | SYSTOLIC BLOOD PRESSURE: 146 MMHG

## 2024-07-09 DIAGNOSIS — D05.12 DUCTAL CARCINOMA IN SITU (DCIS) OF LEFT BREAST: ICD-10-CM

## 2024-07-09 LAB
ALBUMIN SERPL-MCNC: 4 G/DL (ref 3.4–4.8)
ALBUMIN/GLOB SERPL: 1.2 RATIO (ref 1.1–2)
ALP SERPL-CCNC: 128 UNIT/L (ref 40–150)
ALT SERPL-CCNC: 11 UNIT/L (ref 0–55)
ANION GAP SERPL CALC-SCNC: 10 MEQ/L
AST SERPL-CCNC: 12 UNIT/L (ref 5–34)
BASOPHILS # BLD AUTO: 0.04 X10(3)/MCL
BASOPHILS NFR BLD AUTO: 0.5 %
BILIRUB SERPL-MCNC: 0.8 MG/DL
BUN SERPL-MCNC: 15.3 MG/DL (ref 9.8–20.1)
CALCIUM SERPL-MCNC: 9.6 MG/DL (ref 8.4–10.2)
CHLORIDE SERPL-SCNC: 108 MMOL/L (ref 98–107)
CO2 SERPL-SCNC: 25 MMOL/L (ref 23–31)
CREAT SERPL-MCNC: 0.91 MG/DL (ref 0.55–1.02)
CREAT/UREA NIT SERPL: 17
EOSINOPHIL # BLD AUTO: 0.13 X10(3)/MCL (ref 0–0.9)
EOSINOPHIL NFR BLD AUTO: 1.5 %
ERYTHROCYTE [DISTWIDTH] IN BLOOD BY AUTOMATED COUNT: 13.2 % (ref 11.5–17)
GFR SERPLBLD CREATININE-BSD FMLA CKD-EPI: >60 ML/MIN/1.73/M2
GLOBULIN SER-MCNC: 3.3 GM/DL (ref 2.4–3.5)
GLUCOSE SERPL-MCNC: 92 MG/DL (ref 82–115)
HCT VFR BLD AUTO: 40.4 % (ref 37–47)
HGB BLD-MCNC: 12.6 G/DL (ref 12–16)
IMM GRANULOCYTES # BLD AUTO: 0.02 X10(3)/MCL (ref 0–0.04)
IMM GRANULOCYTES NFR BLD AUTO: 0.2 %
LYMPHOCYTES # BLD AUTO: 2.08 X10(3)/MCL (ref 0.6–4.6)
LYMPHOCYTES NFR BLD AUTO: 24 %
MCH RBC QN AUTO: 28.3 PG (ref 27–31)
MCHC RBC AUTO-ENTMCNC: 31.2 G/DL (ref 33–36)
MCV RBC AUTO: 90.6 FL (ref 80–94)
MONOCYTES # BLD AUTO: 0.48 X10(3)/MCL (ref 0.1–1.3)
MONOCYTES NFR BLD AUTO: 5.5 %
NEUTROPHILS # BLD AUTO: 5.93 X10(3)/MCL (ref 2.1–9.2)
NEUTROPHILS NFR BLD AUTO: 68.3 %
NRBC BLD AUTO-RTO: 0 %
PLATELET # BLD AUTO: 396 X10(3)/MCL (ref 130–400)
PMV BLD AUTO: 9.7 FL (ref 7.4–10.4)
POTASSIUM SERPL-SCNC: 3.7 MMOL/L (ref 3.5–5.1)
PROT SERPL-MCNC: 7.3 GM/DL (ref 5.8–7.6)
RBC # BLD AUTO: 4.46 X10(6)/MCL (ref 4.2–5.4)
SODIUM SERPL-SCNC: 143 MMOL/L (ref 136–145)
WBC # BLD AUTO: 8.68 X10(3)/MCL (ref 4.5–11.5)

## 2024-07-09 PROCEDURE — 93010 ELECTROCARDIOGRAM REPORT: CPT | Mod: ,,, | Performed by: INTERNAL MEDICINE

## 2024-07-09 PROCEDURE — 36415 COLL VENOUS BLD VENIPUNCTURE: CPT

## 2024-07-09 PROCEDURE — 99999 PR PBB SHADOW E&M-EST. PATIENT-LVL V: CPT | Mod: PBBFAC,,, | Performed by: SURGERY

## 2024-07-09 PROCEDURE — 99215 OFFICE O/P EST HI 40 MIN: CPT | Mod: PBBFAC,25 | Performed by: SURGERY

## 2024-07-09 PROCEDURE — 93005 ELECTROCARDIOGRAM TRACING: CPT

## 2024-07-09 PROCEDURE — 99205 OFFICE O/P NEW HI 60 MIN: CPT | Mod: S$PBB,,, | Performed by: SURGERY

## 2024-07-09 RX ORDER — AMLODIPINE BESYLATE 10 MG/1
10 TABLET ORAL
COMMUNITY

## 2024-07-09 RX ORDER — BUPROPION HYDROCHLORIDE 300 MG/1
TABLET ORAL
COMMUNITY

## 2024-07-09 RX ORDER — SODIUM CHLORIDE, SODIUM LACTATE, POTASSIUM CHLORIDE, CALCIUM CHLORIDE 600; 310; 30; 20 MG/100ML; MG/100ML; MG/100ML; MG/100ML
INJECTION, SOLUTION INTRAVENOUS CONTINUOUS
OUTPATIENT
Start: 2024-07-09

## 2024-07-09 RX ORDER — ACETAMINOPHEN AND CODEINE PHOSPHATE 300; 30 MG/1; MG/1
1 TABLET ORAL
COMMUNITY
Start: 2024-05-25

## 2024-07-09 RX ORDER — CLOPIDOGREL BISULFATE 75 MG/1
TABLET ORAL
COMMUNITY
Start: 2024-01-19

## 2024-07-09 RX ORDER — FUROSEMIDE 40 MG/1
TABLET ORAL
COMMUNITY
Start: 2024-01-19

## 2024-07-09 RX ORDER — IRBESARTAN 300 MG/1
300 TABLET ORAL
COMMUNITY

## 2024-07-09 NOTE — ASSESSMENT & PLAN NOTE
Surgery - Left breast partial mastectomy/lumpectomy with seed localization  Tentative Date: 7/22/2024  Preop - CBC, CMP, EKG  Surgical instructions and information were discussed in detail

## 2024-07-10 NOTE — NURSING
Breast Nurse Navigator Note    Distress Screening Tool  Distress Score    Distress Score: 10 - Extreme Distress    Met with patient after consult with Dr. Welch. Patient's friend present during the consultation.  Referred to the following outside resources: [employer] and [facility]. Verbalized understanding that these resources may provide support throughout the course of her treatment. Patient amenable to receiving additional support and gave her permission to be referred to these organizations.   Breast Cancer Education: Breast Cancer Binder given to the patient.   All questions answers to the patient's satisfaction.

## 2024-07-11 LAB
OHS QRS DURATION: 96 MS
OHS QTC CALCULATION: 461 MS

## 2024-07-17 ENCOUNTER — HOSPITAL ENCOUNTER (OUTPATIENT)
Dept: RADIOLOGY | Facility: HOSPITAL | Age: 64
Discharge: HOME | End: 2024-07-17
Attending: SURGERY
Payer: MEDICARE

## 2024-07-17 DIAGNOSIS — D05.12 INTRADUCTAL CARCINOMA IN SITU OF LEFT BREAST: ICD-10-CM

## 2024-07-17 PROCEDURE — 77061 BREAST TOMOSYNTHESIS UNI: CPT | Mod: TC,LT

## 2024-07-17 PROCEDURE — A4648 IMPLANTABLE TISSUE MARKER: HCPCS

## 2024-07-17 PROCEDURE — 77065 DX MAMMO INCL CAD UNI: CPT | Mod: 26,LT,, | Performed by: RADIOLOGY

## 2024-07-17 PROCEDURE — 77061 BREAST TOMOSYNTHESIS UNI: CPT | Mod: 26,LT,, | Performed by: RADIOLOGY

## 2024-07-17 PROCEDURE — 19285 PERQ DEV BREAST 1ST US IMAG: CPT | Mod: LT,,, | Performed by: RADIOLOGY

## 2024-07-19 ENCOUNTER — ANESTHESIA EVENT (OUTPATIENT)
Dept: SURGERY | Facility: HOSPITAL | Age: 64
End: 2024-07-19
Payer: MEDICARE

## 2024-07-19 NOTE — ANESTHESIA PREPROCEDURE EVALUATION
"                                                                                                             07/19/2024  Barbara Alvarez is a 64 y.o., female.    Pre-operative evaluation for Procedure(s) (LRB):  MASTECTOMY, PARTIAL - ///left (Left)    Barbara Alvarez is a 64 y.o. female     Patient Active Problem List   Diagnosis    Ductal carcinoma in situ (DCIS) of left breast       Review of patient's allergies indicates:   Allergen Reactions    Amitriptyline Other (See Comments)     Other Reaction(s): muscle tightening    MUSCLE WEAKNESS    Adhesive Rash    Enoxaparin Rash    Methocarbamol Rash and Other (See Comments)     MUSCLE WEAKNESS    Rivaroxaban Rash    Warfarin Rash       No current facility-administered medications on file prior to encounter.     Current Outpatient Medications on File Prior to Encounter   Medication Sig Dispense Refill    acetaminophen-codeine 300-30mg (TYLENOL #3) 300-30 mg Tab Take 1 tablet by mouth as needed.      amLODIPine (NORVASC) 10 MG tablet Take 10 mg by mouth.      buPROPion (WELLBUTRIN XL) 300 MG 24 hr tablet TAKE 1 TABLET BY MOUTH EVERY MORNING FOR DEPRESSION      clopidogreL (PLAVIX) 75 mg tablet       irbesartan (AVAPRO) 300 MG tablet Take 300 mg by mouth.      furosemide (LASIX) 40 MG tablet          Past Surgical History:   Procedure Laterality Date    APPENDECTOMY      CHOLECYSTECTOMY      RENETTA FILTER PLACEMENT      2015    HYSTERECTOMY      KNEE SURGERY Left     NECK SURGERY      PLACEMENT, INFERIOR VENA CAVA FILTER Left     SHOULDER ARTHROSCOPY W/ ROTATOR CUFF REPAIR Left     STENT PLACEMENT/PRIOR TO CALIN Left 2015     CBC: No results for input(s): "WBC", "RBC", "HGB", "HCT", "PLT", "MCV", "MCH", "MCHC" in the last 72 hours.    [facility]: WNL    : No results for input(s): "NA", "K", "CL", "CO2", "BUN", "CREATININE", "GLU", "MG", "PHOS", "CALCIUM", "ALBUMIN", "PROT", "ALKPHOS", "ALT", "AST", "BILITOT" in the last 72 hours.    INR  No results for input(s): "PT", " ""INR", "PROTIME", "APTT" in the last 72 hours.    Diagnostic Studies:  CXR :  8/5/2023: NAPD    EKG 7/9/2024: NSR=66. Sinus arrhythm    2D Echo :  No results found for this or any previous visit.  Pre-op Assessment    I have reviewed the Patient Summary Reports.     I have reviewed the Nursing Notes. I have reviewed the NPO Status.   I have reviewed the Medications.     Review of Systems  Anesthesia Hx:  No problems with previous Anesthesia   History of prior surgery of interest to airway management or planning: cervical fusion. Previous anesthesia: General 11/14/2023: Lap Appy: EM, DL Mil2, Gr1x1, ET7; 2016 Cerv Spine Sx: ; Hysterectomy:; Knee Sx:; RCR; APPy;  GB with general anesthesia.       Airway issues documented on chart review include GETA     Denies Family Hx of Anesthesia complications.    Denies Personal Hx of Anesthesia complications.                    Social:  Non-Smoker, No Alcohol Use       Hematology/Oncology:  Hematology Normal                  Hematology Comments: DVT s/p [place] Filter on Plavix.    Current/Recent Cancer.  Breast    left          EENT/Dental:  EENT/Dental Normal           Cardiovascular:     Denies Pacemaker. Hypertension    Denies CABG/stent.                                     Pulmonary:  Pulmonary Normal                       Renal/:  Renal/ Normal                 Hepatic/GI:  Hepatic/GI Normal     Denies GERD.             Musculoskeletal:     2016S/p Cerv Sx :        Spine Disorders: cervical and lumbar Degenerative disease           Neurological:  Neurology Normal                                      Endocrine:  Endocrine Normal          Obesity / BMI > 30  Dermatological:  Skin Normal    Psych:  Psychiatric History                  Physical Exam  General: Cooperative, Alert and Oriented    Airway:  Mallampati: III / II  Mouth Opening: Normal  TM Distance: Normal  Tongue: Normal  Neck ROM: Normal ROM    Dental:  Intact, Dentures, Partial Dentures  Px denies any loose " teeth.  Chest/Lungs:  Normal Respiratory Rate    Heart:  Rate: Normal  Rhythm: Regular Rhythm    Abdomen:  Normal, Soft             Anesthesia Plan  Type of Anesthesia, risks & benefits discussed:    Anesthesia Type: Gen ETT, Gen Supraglottic Airway  Intra-op Monitoring Plan: Standard ASA Monitors  Post Op Pain Control Plan: multimodal analgesia  Induction:  IV  Airway Plan: Direct, Post-Induction  Informed Consent: Informed consent signed with the Patient and all parties understand the risks and agree with anesthesia plan.  All questions answered.   ASA Score: 3  Day of Surgery Review of History & Physical: H&P Update referred to the surgeon/provider.I have interviewed and examined the patient. I have reviewed the patient's H&P dated:     Ready For Surgery From Anesthesia Perspective.     .

## 2024-07-22 ENCOUNTER — ANESTHESIA (OUTPATIENT)
Dept: SURGERY | Facility: HOSPITAL | Age: 64
End: 2024-07-22
Payer: MEDICARE

## 2024-07-22 ENCOUNTER — HOSPITAL ENCOUNTER (OUTPATIENT)
Dept: RADIOLOGY | Facility: HOSPITAL | Age: 64
Discharge: HOME | End: 2024-07-22
Attending: SURGERY | Admitting: SURGERY
Payer: MEDICARE

## 2024-07-22 ENCOUNTER — HOSPITAL ENCOUNTER (OUTPATIENT)
Facility: HOSPITAL | Age: 64
Discharge: HOME | End: 2024-07-22
Attending: SURGERY | Admitting: SURGERY
Payer: MEDICARE

## 2024-07-22 DIAGNOSIS — D05.12 DUCTAL CARCINOMA IN SITU (DCIS) OF LEFT BREAST: ICD-10-CM

## 2024-07-22 DIAGNOSIS — R92.8 ABNORMAL MAMMOGRAM: ICD-10-CM

## 2024-07-22 PROCEDURE — 63600175 PHARM REV CODE 636 W HCPCS

## 2024-07-22 PROCEDURE — 71000039 HC RECOVERY, EACH ADD'L HOUR: Performed by: SURGERY

## 2024-07-22 PROCEDURE — 71000033 HC RECOVERY, INTIAL HOUR: Performed by: SURGERY

## 2024-07-22 PROCEDURE — 25000003 PHARM REV CODE 250: Performed by: ANESTHESIOLOGY

## 2024-07-22 PROCEDURE — 19301 PARTIAL MASTECTOMY: CPT | Mod: AS,LT,, | Performed by: PHYSICIAN ASSISTANT

## 2024-07-22 PROCEDURE — 36000707: Performed by: SURGERY

## 2024-07-22 PROCEDURE — 76098 X-RAY EXAM SURGICAL SPECIMEN: CPT | Mod: TC

## 2024-07-22 PROCEDURE — 63600175 PHARM REV CODE 636 W HCPCS: Performed by: ANESTHESIOLOGY

## 2024-07-22 PROCEDURE — 25000003 PHARM REV CODE 250: Performed by: NURSE ANESTHETIST, CERTIFIED REGISTERED

## 2024-07-22 PROCEDURE — 27201423 OPTIME MED/SURG SUP & DEVICES STERILE SUPPLY: Performed by: SURGERY

## 2024-07-22 PROCEDURE — 37000008 HC ANESTHESIA 1ST 15 MINUTES: Performed by: SURGERY

## 2024-07-22 PROCEDURE — 63600175 PHARM REV CODE 636 W HCPCS: Performed by: NURSE ANESTHETIST, CERTIFIED REGISTERED

## 2024-07-22 PROCEDURE — 76098 X-RAY EXAM SURGICAL SPECIMEN: CPT | Mod: 26,,, | Performed by: RADIOLOGY

## 2024-07-22 PROCEDURE — 71000015 HC POSTOP RECOV 1ST HR: Performed by: SURGERY

## 2024-07-22 PROCEDURE — 37000009 HC ANESTHESIA EA ADD 15 MINS: Performed by: SURGERY

## 2024-07-22 PROCEDURE — 36000706: Performed by: SURGERY

## 2024-07-22 PROCEDURE — 71000016 HC POSTOP RECOV ADDL HR: Performed by: SURGERY

## 2024-07-22 PROCEDURE — 19301 PARTIAL MASTECTOMY: CPT | Mod: LT,,, | Performed by: SURGERY

## 2024-07-22 PROCEDURE — 63600175 PHARM REV CODE 636 W HCPCS: Performed by: SURGERY

## 2024-07-22 RX ORDER — HYDROMORPHONE HYDROCHLORIDE 2 MG/ML
0.4 INJECTION, SOLUTION INTRAMUSCULAR; INTRAVENOUS; SUBCUTANEOUS EVERY 10 MIN PRN
Status: DISCONTINUED | OUTPATIENT
Start: 2024-07-22 | End: 2024-07-22 | Stop reason: HOSPADM

## 2024-07-22 RX ORDER — CEFAZOLIN SODIUM 1 G/3ML
INJECTION, POWDER, FOR SOLUTION INTRAMUSCULAR; INTRAVENOUS
Status: DISCONTINUED | OUTPATIENT
Start: 2024-07-22 | End: 2024-07-22 | Stop reason: HOSPADM

## 2024-07-22 RX ORDER — HYDROMORPHONE HYDROCHLORIDE 2 MG/ML
0.4 INJECTION, SOLUTION INTRAMUSCULAR; INTRAVENOUS; SUBCUTANEOUS EVERY 10 MIN PRN
Status: DISCONTINUED | OUTPATIENT
Start: 2024-07-22 | End: 2024-07-22

## 2024-07-22 RX ORDER — SODIUM CHLORIDE, SODIUM LACTATE, POTASSIUM CHLORIDE, CALCIUM CHLORIDE 600; 310; 30; 20 MG/100ML; MG/100ML; MG/100ML; MG/100ML
INJECTION, SOLUTION INTRAVENOUS CONTINUOUS
Status: DISCONTINUED | OUTPATIENT
Start: 2024-07-22 | End: 2024-07-22 | Stop reason: HOSPADM

## 2024-07-22 RX ORDER — ISOSULFAN BLUE 50 MG/5ML
INJECTION, SOLUTION SUBCUTANEOUS
Status: DISCONTINUED
Start: 2024-07-22 | End: 2024-07-22 | Stop reason: WASHOUT

## 2024-07-22 RX ORDER — ONDANSETRON HYDROCHLORIDE 2 MG/ML
4 INJECTION, SOLUTION INTRAVENOUS EVERY 12 HOURS PRN
Status: CANCELLED | OUTPATIENT
Start: 2024-07-22

## 2024-07-22 RX ORDER — CEFAZOLIN SODIUM 1 G/3ML
INJECTION, POWDER, FOR SOLUTION INTRAMUSCULAR; INTRAVENOUS
Status: DISCONTINUED
Start: 2024-07-22 | End: 2024-07-22 | Stop reason: HOSPADM

## 2024-07-22 RX ORDER — CEFAZOLIN SODIUM 1 G/3ML
2 INJECTION, POWDER, FOR SOLUTION INTRAMUSCULAR; INTRAVENOUS ONCE
Status: COMPLETED | OUTPATIENT
Start: 2024-07-22 | End: 2024-07-22

## 2024-07-22 RX ORDER — LIDOCAINE HYDROCHLORIDE 10 MG/ML
INJECTION, SOLUTION EPIDURAL; INFILTRATION; INTRACAUDAL; PERINEURAL
Status: DISCONTINUED | OUTPATIENT
Start: 2024-07-22 | End: 2024-07-22

## 2024-07-22 RX ORDER — PROPOFOL 10 MG/ML
VIAL (ML) INTRAVENOUS
Status: DISCONTINUED | OUTPATIENT
Start: 2024-07-22 | End: 2024-07-22

## 2024-07-22 RX ORDER — GLUCAGON 1 MG
1 KIT INJECTION
Status: DISCONTINUED | OUTPATIENT
Start: 2024-07-22 | End: 2024-07-22 | Stop reason: HOSPADM

## 2024-07-22 RX ORDER — SODIUM CHLORIDE, SODIUM LACTATE, POTASSIUM CHLORIDE, CALCIUM CHLORIDE 600; 310; 30; 20 MG/100ML; MG/100ML; MG/100ML; MG/100ML
INJECTION, SOLUTION INTRAVENOUS CONTINUOUS
Status: ACTIVE | OUTPATIENT
Start: 2024-07-22 | End: 2024-07-22

## 2024-07-22 RX ORDER — ONDANSETRON HYDROCHLORIDE 2 MG/ML
4 INJECTION, SOLUTION INTRAVENOUS ONCE AS NEEDED
Status: COMPLETED | OUTPATIENT
Start: 2024-07-22 | End: 2024-07-22

## 2024-07-22 RX ORDER — BUPIVACAINE HYDROCHLORIDE 5 MG/ML
INJECTION, SOLUTION EPIDURAL; INTRACAUDAL
Status: DISCONTINUED | OUTPATIENT
Start: 2024-07-22 | End: 2024-07-22 | Stop reason: HOSPADM

## 2024-07-22 RX ORDER — MIDAZOLAM HYDROCHLORIDE 1 MG/ML
2 INJECTION INTRAMUSCULAR; INTRAVENOUS
Status: COMPLETED | OUTPATIENT
Start: 2024-07-22 | End: 2024-07-22

## 2024-07-22 RX ORDER — TRAMADOL HYDROCHLORIDE 50 MG/1
50 TABLET ORAL EVERY 6 HOURS PRN
Qty: 28 TABLET | Refills: 0 | Status: SHIPPED | OUTPATIENT
Start: 2024-07-22 | End: 2024-07-29

## 2024-07-22 RX ORDER — SCOLOPAMINE TRANSDERMAL SYSTEM 1 MG/1
1 PATCH, EXTENDED RELEASE TRANSDERMAL
Status: DISCONTINUED | OUTPATIENT
Start: 2024-07-22 | End: 2024-07-22 | Stop reason: HOSPADM

## 2024-07-22 RX ORDER — ACETAMINOPHEN 325 MG/1
650 TABLET ORAL
Status: COMPLETED | OUTPATIENT
Start: 2024-07-22 | End: 2024-07-22

## 2024-07-22 RX ORDER — TRAMADOL HYDROCHLORIDE 50 MG/1
50 TABLET ORAL EVERY 4 HOURS PRN
Status: DISCONTINUED | OUTPATIENT
Start: 2024-07-22 | End: 2024-07-22 | Stop reason: HOSPADM

## 2024-07-22 RX ORDER — ONDANSETRON HYDROCHLORIDE 2 MG/ML
4 INJECTION, SOLUTION INTRAVENOUS ONCE
Status: COMPLETED | OUTPATIENT
Start: 2024-07-22 | End: 2024-07-22

## 2024-07-22 RX ORDER — FAMOTIDINE 20 MG/1
40 TABLET, FILM COATED ORAL ONCE
Status: COMPLETED | OUTPATIENT
Start: 2024-07-22 | End: 2024-07-22

## 2024-07-22 RX ORDER — FENTANYL CITRATE 50 UG/ML
INJECTION, SOLUTION INTRAMUSCULAR; INTRAVENOUS
Status: DISCONTINUED | OUTPATIENT
Start: 2024-07-22 | End: 2024-07-22

## 2024-07-22 RX ORDER — MIDAZOLAM HYDROCHLORIDE 2 MG/2ML
INJECTION, SOLUTION INTRAMUSCULAR; INTRAVENOUS
Status: COMPLETED
Start: 2024-07-22 | End: 2024-07-22

## 2024-07-22 RX ORDER — BUPIVACAINE HYDROCHLORIDE 5 MG/ML
INJECTION, SOLUTION EPIDURAL; INTRACAUDAL
Status: DISCONTINUED
Start: 2024-07-22 | End: 2024-07-22 | Stop reason: HOSPADM

## 2024-07-22 RX ORDER — MEPERIDINE HYDROCHLORIDE 25 MG/ML
12.5 INJECTION INTRAMUSCULAR; INTRAVENOUS; SUBCUTANEOUS EVERY 10 MIN PRN
Status: DISCONTINUED | OUTPATIENT
Start: 2024-07-22 | End: 2024-07-22 | Stop reason: HOSPADM

## 2024-07-22 RX ADMIN — ONDANSETRON 4 MG: 2 INJECTION INTRAMUSCULAR; INTRAVENOUS at 11:07

## 2024-07-22 RX ADMIN — LIDOCAINE HYDROCHLORIDE 50 MG: 10 INJECTION, SOLUTION EPIDURAL; INFILTRATION; INTRACAUDAL; PERINEURAL at 09:07

## 2024-07-22 RX ADMIN — MIDAZOLAM HYDROCHLORIDE 2 MG: 1 INJECTION INTRAMUSCULAR; INTRAVENOUS at 08:07

## 2024-07-22 RX ADMIN — HYDROMORPHONE HYDROCHLORIDE 0.4 MG: 2 INJECTION INTRAMUSCULAR; INTRAVENOUS; SUBCUTANEOUS at 11:07

## 2024-07-22 RX ADMIN — FENTANYL CITRATE 50 MCG: 50 INJECTION, SOLUTION INTRAMUSCULAR; INTRAVENOUS at 09:07

## 2024-07-22 RX ADMIN — ONDANSETRON 4 MG: 2 INJECTION INTRAMUSCULAR; INTRAVENOUS at 08:07

## 2024-07-22 RX ADMIN — ACETAMINOPHEN 650 MG: 325 TABLET, FILM COATED ORAL at 08:07

## 2024-07-22 RX ADMIN — SODIUM CHLORIDE, POTASSIUM CHLORIDE, SODIUM LACTATE AND CALCIUM CHLORIDE: 600; 310; 30; 20 INJECTION, SOLUTION INTRAVENOUS at 01:07

## 2024-07-22 RX ADMIN — SODIUM CHLORIDE, POTASSIUM CHLORIDE, SODIUM LACTATE AND CALCIUM CHLORIDE: 600; 310; 30; 20 INJECTION, SOLUTION INTRAVENOUS at 08:07

## 2024-07-22 RX ADMIN — SODIUM CHLORIDE, POTASSIUM CHLORIDE, SODIUM LACTATE AND CALCIUM CHLORIDE: 600; 310; 30; 20 INJECTION, SOLUTION INTRAVENOUS at 11:07

## 2024-07-22 RX ADMIN — FAMOTIDINE 40 MG: 20 TABLET, FILM COATED ORAL at 08:07

## 2024-07-22 RX ADMIN — CEFAZOLIN 2 G: 330 INJECTION, POWDER, FOR SOLUTION INTRAMUSCULAR; INTRAVENOUS at 09:07

## 2024-07-22 RX ADMIN — SCOPOLAMINE 1 PATCH: 1 PATCH TRANSDERMAL at 11:07

## 2024-07-22 RX ADMIN — MIDAZOLAM HYDROCHLORIDE 2 MG: 1 INJECTION, SOLUTION INTRAMUSCULAR; INTRAVENOUS at 08:07

## 2024-07-22 RX ADMIN — PROPOFOL 175 MG: 10 INJECTION, EMULSION INTRAVENOUS at 09:07

## 2024-07-22 NOTE — ANESTHESIA POSTPROCEDURE EVALUATION
Anesthesia Post Evaluation    Patient: Barbara Alvarez    Procedure(s) Performed: Procedure(s) (LRB):  MASTECTOMY, PARTIAL - left  w/ radiological marker (Left)    Final Anesthesia Type: general      Patient location during evaluation: PACU  Patient participation: No - Unable to Participate, Sedation  Level of consciousness: sedated  Post-procedure vital signs: reviewed and stable  Pain management: adequate  Airway patency: patent  ELAINE mitigation strategies: Multimodal analgesia  PONV status at discharge: No PONV  Anesthetic complications: no      Cardiovascular status: stable  Respiratory status: nasal cannula  Hydration status: euvolemic  Follow-up not needed.              Vitals Value Taken Time   /69 07/22/24 0808   Temp 36.9 °C (98.5 °F) 07/22/24 0808   Pulse 80 07/22/24 0808   Resp 20 07/22/24 0808   SpO2 98 % 07/22/24 0808         No case tracking events are documented in the log.      Pain/Leticia Score: Pain Rating Prior to Med Admin: 0 (7/22/2024  8:27 AM)

## 2024-07-22 NOTE — DISCHARGE INSTRUCTIONS
BREAST SURGERY POST-OP INSTRUCTIONS  DR. CANDICE BRADY PA-C     How do I care for my incisions?  You and your caregiver should look at your incision daily. Call your doctor if you see any redness or drainage from your incision.  You will be given a support bra, wear this for 24 hours a day (unless showering or sponge bathing) for comfort and to help decrease amount of swelling. If the bra fits too loose or too tight you may go to your local store a purchase a front opening sports bra that fits snug.   Your incision will be closed with sutures (stitches) under your skin. These sutures dissolve on their own, so they do not need to be removed.  · If you go home with Steri-StripsTM on your incision, they will loosen and fall off by themselves. If they havent fallen off within 14 days, you may remove them.  · If you go home with glue over your sutures (stitches), it will also loosen and peel off, similarly to the Steri-Strips.  ** If you have had a Mastectomy and/or Reconstruction and/or Axillary Lymph Node Dissection:  You may have 1-2 Mio-Sorenson Drains in place. Please refer to the additional instructions discussing care of your drains.     Is it normal to feel new sensations?  As you are healing, you may feel a several different sensations in your breast. Tenderness, numbness, and twinges are common examples. These sensations usually come and go, and will lessen over time, usually within the first few months after surgery. As you continue to heal, you may feel scar tissue along your incision site. It will feel hard. This is common and will soften over the next several months.     Can I shower?  You can shower 24 hours after your surgery. Taking a warm shower is relaxing and can help decrease discomfort. Use soap when you shower and gently wash your incision. Pat the areas dry with a towel after showering, and leave your incision uncovered, unless you have drainage from your incision. If you  have drainage, call your doctors office.  Do not take tub baths, swim, or use hot tubs or saunas until you discuss it with your doctor at the first appointment after your surgery.    Will I have pain when I am home?  The length of time each person has pain or discomfort varies. You will be given a prescription for pain medication before you go home. Follow the guidelines below to manage your pain.  · Take your medication as directed and as needed.  · Icing the affected area can also alleviate pain symptoms (ice the affected area at least four times a day, 15-20 minutes at a time).  · Call your doctor if the pain medication prescribed for you doesnt relieve your pain.  · Do not drive or drink alcohol while you are taking prescription pain medication.  · As your incision heals, you will have less pain and need less pain medication. A mild pain reliever such as acetaminophen (Tylenol) or ibuprofen (Advil) will relieve aches and discomfort. However, large quantities of acetaminophen may be harmful to your liver. Do not take more acetaminophen than the amount directed on the bottle or as instructed by your doctor or nurse.  · Pain medication should help you as you resume your normal activities. Pain medication is most effective 30 to 45 minutes after taking it.  · Keep track of when you take your pain medication. Taking it when your pain first begins is more effective than waiting for the pain to get worse.  Pain medication may cause constipation (having fewer bowel movements than what is normal for you).    How can I prevent constipation?  · Go to the bathroom at the same time every day. Your body will get used to going at that time.  · If you feel the urge to go, do not put it off. Try to use the bathroom 5 to 15 minutes after meals.  · After breakfast is a good time to move your bowels because the reflexes in your colon are strongest then.  · Exercise if you can; walking is an excellent form of exercise.  · Drink 8  (8-ounce) glasses (2 liters) of liquids daily, if you can. Drink water, juices, soups, ice cream shakes, and other drinks that do not have caffeine. Beverages with caffeine, such as coffee and soda, pull fluid out of the body.  · Slowly increase the fiber in your diet to 25 to 35 grams per day. Fruits, vegetables, whole grains, and cereals contain fiber. If you have an ostomy or have had recent bowel surgery, check with your doctor or nurse before making any changes in your diet.  · Both over-the-counter and prescription medications are available to treat constipation. Start with 1 of the following over-the-counter medications first:  o Docusate sodium (Colace®) 100 mg. Take __1___ capsules __1___ time a day. This is a stool softener that causes few side effects. Do not take it with mineral oil.  o Polyethylene glycol (MiraLAX®) 17 grams daily.  o Senna (Senokot®) 2 tablets at bedtime. This is a stimulant laxative, which can cause cramping.  · If you havent had a bowel movement in 2 days, call your doctor or nurse.    Will I be able to eat?  You can resume eating when you go home after surgery. Eating a balanced diet high in protein will help you heal after surgery. Your diet should include a healthy protein source at each meal, as well as fruits, vegetables, and whole grains. If you have questions about your diet, ask to see a dietitian.    When is it safe for me to drive and what activities can I perform?  You may resume driving after surgery as long as you are not taking prescription pain medication that may make you drowsy, and you have your full range of motion.  You should not lift anything heavier than 10-15 lbs the first week. After this time, you may gradually increase the amount of weight. You want to take it easy the first 2 weeks, no strenuous or repetitive movements such as vacuuming or scrubbing. Walking is okay. Ask the doctor if you have questions.    How long until I have the pathology  results?  The pathology report usually takes to 7 to 10 business days.    When is my first appointment after my surgery?  You should be given or schedule a follow-up appointment 1 to 2 weeks after your surgery.    How can I cope with my feelings?   After surgery for a serious illness, you may have new and upsetting feelings. Many patients say they felt sad, worried, nervous, irritable, or angry at one time or another. You may find that you cannot control some of these feelings. If this happens, its a good idea to seek emotional support.  The first step in coping is to talk about how you feel. Family and friends can help. Your nurse, doctor, and  can reassure, support, and guide you. It is always a good idea to let these professionals know how you, your family, and your friends are feeling emotionally. Many resources are available to patients and their families. Whether you are in the hospital or at home, we are here to help you and your family and friends handle the emotional aspects of your illness.    What if I have other questions?  If you have any questions or concerns, please talk with your doctor or nurse. You can reach them Monday through Thursday from 9:00 AM to 5:00 PM and Friday from 9:00 AM to 12:00 PM at [phone].  After 5:00 PM M-Th or 12:00 PM Fri, during the weekend, and on holidays, please call [phone] and ask for the doctor on call.    PLEASE CALL YOUR DOCTOR OR NURSE IF YOU HAVE:  · A temperature of 101° F (38.3° C) or higher  · Shortness of breath  · Warmer than normal skin around your incision  · Increased discomfort in the area  · Increased redness around your incision  · New or increased swelling around your incision  · Discharge from your incision

## 2024-07-22 NOTE — ANESTHESIA POSTPROCEDURE EVALUATION
Anesthesia Post Evaluation    Patient: Barbara Alvarez    Procedure(s) Performed: Procedure(s) (LRB):  MASTECTOMY, PARTIAL - left  w/ radiological marker (Left)    Final Anesthesia Type: general      Patient location during evaluation: PACU  Patient participation: Yes- Able to Participate  Level of consciousness: awake and alert, awake and oriented  Post-procedure vital signs: reviewed and stable  Pain management: adequate  Airway patency: patent    PONV status at discharge: No PONV  Anesthetic complications: no      Cardiovascular status: blood pressure returned to baseline, hemodynamically stable and stable  Respiratory status: unassisted, spontaneous ventilation and room air  Hydration status: euvolemic  Follow-up not needed.              Vitals Value Taken Time   /74 07/22/24 1219   Temp 36.5 °C (97.7 °F) 07/22/24 1109   Pulse 73 07/22/24 1219   Resp 29 07/22/24 1214   SpO2 96 % 07/22/24 1219   Vitals shown include unfiled device data.      No case tracking events are documented in the log.      Pain/Leticia Score: Pain Rating Prior to Med Admin: 6 (7/22/2024 11:49 AM)  Leticia Score: 8 (7/22/2024 12:10 PM)

## 2024-07-22 NOTE — BRIEF OP NOTE
[facility]  Brief Operative Note     SUMMARY     Surgery Date: 7/22/2024     Surgeon: Deirdre Welch MD    Assist: Daysi Naidu PA-C    Surgeons and Role:     * Deirdre Welch MD - Primary     * Moise Amezcua MD - Resident - Assisting      Pre-op Diagnosis:  Ductal carcinoma in situ (DCIS) of left breast [D05.12]    Post-op Diagnosis:  Post-Op Diagnosis Codes:     * Ductal carcinoma in situ (DCIS) of left breast [D05.12]    Procedure(s) (LRB):  MASTECTOMY, PARTIAL - left  w/ radiological marker (Left)    Anesthesia: General    Findings/Key Components: Removal of left breast biopsy-proven malignancy for pathology review.    Estimated Blood Loss: 10 ml         Specimens:   Specimen (24h ago, onward)       Start     Ordered    07/22/24 1045  Specimen to Pathology  RELEASE UPON ORDERING        References:    Click here for ordering Quick Tip   Question:  Release to patient  Answer:  Immediate    07/22/24 1045                  ID Type Source Tests Collected by Time Destination   A : LEFT BREAST SIMPLE MASTECTOMY - CHARMS AND INK ZARINA MARGINS Tissue Breast, Left SPECIMEN TO PATHOLOGY Deirdre Welch MD 7/22/2024 1009        Discharge Note    SUMMARY     Admit Date: 7/22/2024    Discharge Date and Time:  07/22/2024 11:04 AM    Hospital Course (synopsis of major diagnoses, care, treatment, and services provided during the course of the hospital stay): status post left breast partial mastectomy     Final Diagnosis: Post-Op Diagnosis Codes:     * Ductal carcinoma in situ (DCIS) of left breast [D05.12]    Disposition: Home or Self Care    Follow Up/Patient Instructions: Instructions provided. Follow-up planned for 7/30/2024 a 10 am.    Medications:  Reconciled Home Medications:      Medication List        ASK your doctor about these medications      acetaminophen-codeine 300-30mg 300-30 mg Tab  Commonly known as: TYLENOL #3  Take 1 tablet by mouth as needed.     amLODIPine 10 MG  tablet  Commonly known as: NORVASC  Take 10 mg by mouth.     buPROPion 300 MG 24 hr tablet  Commonly known as: WELLBUTRIN XL  TAKE 1 TABLET BY MOUTH EVERY MORNING FOR DEPRESSION     clopidogreL 75 mg tablet  Commonly known as: PLAVIX     furosemide 40 MG tablet  Commonly known as: LASIX     irbesartan 300 MG tablet  Commonly known as: AVAPRO  Take 300 mg by mouth.            No discharge procedures on file.

## 2024-07-22 NOTE — PROGRESS NOTES
Op site dressing noted, surrounding area soft and warm to the touch, color WDL.  Ice pack placed on site. Surgical bra noted over all dressings

## 2024-07-23 ENCOUNTER — TELEPHONE (OUTPATIENT)
Dept: SURGERY | Facility: CLINIC | Age: 64
End: 2024-07-23
Payer: MEDICARE

## 2024-07-23 VITALS
HEIGHT: 67 IN | WEIGHT: 221.56 LBS | BODY MASS INDEX: 34.78 KG/M2 | DIASTOLIC BLOOD PRESSURE: 79 MMHG | OXYGEN SATURATION: 92 % | HEART RATE: 73 BPM | SYSTOLIC BLOOD PRESSURE: 139 MMHG | TEMPERATURE: 99 F | RESPIRATION RATE: 16 BRPM

## 2024-07-23 NOTE — TELEPHONE ENCOUNTER
Patient called with complaints that pain is not relieved with the Tramadol.  States she is taking as directed every 6 hours.  Instructed her that she can increase her dosage to  two tramadol every 6 hours and to supplement with either Acetaminophen or ibuprofen in between doses of Tramadol. Patient verbalized understanding.

## 2024-07-24 NOTE — OP NOTE
DATE OF PROCEDURE: 7/22/2024    SURGEON: Deirdre Welch M.D.    ASSISTANT:  Daysi Naidu PA-C    Surgeons and Role:     * Deirdre Welch MD - Primary     * Moise Amezcua MD - Resident - Assisting      PREOPERATIVE DIAGNOSIS: Ductal carcinoma in situ (DCIS) of left breast [D05.12]     POSTOPERATIVE DIAGNOSIS: Post-Op Diagnosis Codes:     * Ductal carcinoma in situ (DCIS) of left breast [D05.12]     ANESTHESIA: General Anesthesiologist: Eliza Martins MD  CRNA: Rickie Mcguire CRNA     PROCEDURES PERFORMED:   1. Left breast MagSeed localization partial mastectomy (lumpectomy) with excision for clear margins    MASTECTOMY, PARTIAL - left  w/ radiological marker:        PROCEDURE IN DETAIL:   Barbara Alvarez is a 64 y.o. female brought to the operating room for definitive surgical management of left  breast cancer. The patient has elected to undergo breast conserving surgery with partial mastectomy. The patient was informed of the possible risks and complications of the procedure, including but not limited to anesthetic risks, bleeding, infection, and need for additional surgery. The patient concurred with the proposed plan, and has given informed consent. The site of surgery was properly noted/marked in the preoperative holding area.    The patient underwent informed consent. The MagSeed was placed in the  11-12 o'clock 10 cm FN  of the left breast. The MagSeed localization films were reviewed.    She was then brought to the Operating Room and placed in the supine position. Anesthesia was administered. The left breast, anterior chest, arm and axilla were then prepped and draped in a sterile fashion.     Partial mastectomy was performed.  Attention was turned to the left breast itself. An incision was made in the  11-12 o'clock  of the left breast over the anticipated tract of the seed. The specimen was dissected circumferentially around the cancer. The dissection was carried out circumferentially to  include the seed. The dissection was carried down to the pectoralis fascia, leaving the fascia intact. The specimen was completely dissected free. The seed localized partial mastectomy specimen was inked on the back table using green ink inferiorly, blue ink superiorly, orange ink laterally, yellow ink anteriorly, black ink posteriorly, and the red ink medially.  It was then fixed with acetic acid and submitted for specimen radiograph to document adequate margins. Specimen radiograph confirmed the seed, clip and area of interest were within the specimen. Given the appearance and location of the lesion, no additional margins were taken.       Within the lumpectomy cavity, hemostasis was achieved with cautery. The wound was irrigated until clear. There was no evidence of bleeding. It was closed in multiple layers with deep dermal and subcutaneous interrupted 3-0 Monocryl sutures and a running 4-0 Monocryl subcuticular skin closure.    The cavity was closed with interrupted 3-0 Monocryl sutures. The subdermal layers were closed with 3-0 Monocryl and 4-0 subcuticular running skin closures. Dermabond was applied followed by sterile dressings.    All instruments and sponge counts were correct at the end of the procedure.     Significant Surgical Tasks Conducted by the Assistant(s), if Applicable: The skilled assistance of the Physician Assistant, Daysi Naidu PA-C, was necessary for the successful completion of this case. She was essential for proper positioning of the patient, manipulation of instruments, proper exposure, manipulation of tissue, and wound closure.       ESTIMATED BLOOD LOSS: 10 ml    Implants: * No implants in log *    Specimens:   Specimen (24h ago, onward)      None           ID Type Source Tests Collected by Time Destination   A : LEFT BREAST SIMPLE MASTECTOMY - CHARMS AND INK ZARINA MARGINS Tissue Breast, Left SPECIMEN TO PATHOLOGY Deirdre Welch MD 7/22/2024 1009                 Condition:  Good    Disposition: PACU - hemodynamically stable.    Attestation: I performed the procedure.

## 2024-07-29 ENCOUNTER — TELEPHONE (OUTPATIENT)
Dept: SURGERY | Facility: CLINIC | Age: 64
End: 2024-07-29
Payer: MEDICARE

## 2024-07-29 NOTE — PROGRESS NOTES
[facility] Breast Surg  Breast Surgical Oncology  Follow-Up Patient Office Visit       Referring Provider: No ref. provider found  PCP: Antoinette Elizondo NP   Medical Oncologist: No care team member to display   Radiation Oncologist: No care team member to display   Other Care Providers:     Chief Complaint:   Chief Complaint   Patient presents with    Post-op Evaluation     Patient reports intermittent sharp deep spasm pain in left breast, mild swelling, hardness in some areas,no signs of infection        Subjective:   Treatment History:  7/22/2024 Left Lumpectomy      Interval History:  7/30/2024 - Barbara Alvarez presents today for her post op appointment.    HPI:  Barbara Alvarez is a 64 y.o. female who presents on 7/9/2024 for evaluation of newly diagnosed left  breast cancer. Biopsy of left breast mass positive for DCIS     A detailed patient history was obtained and reviewed. She currently denies any breast issues including rashes, redness, pain, swelling, nipple discharge, or new lumps/masses.     Of note, pt developed a blood clot after her first pregnancy and was taken off birth control medications. She had an IVC filter placed and is currently taking Plavix. She see Antoinette Elizondo NP for management of her Plavix.     MG breast density: Category B (scattered areas of fibroglandular tissue)      Imaging:  Bilateral screening mammogram 4/29/2024:   IMPRESSION: INCOMPLETE: NEEDS ADDITIONAL IMAGING EVALUATION  The oval mass in the 12:00 left breast needs further evaluation.       Left diagnostic mammogram 6/10/2024:   IMPRESSION: SUSPICIOUS OF MALIGNANCY  Left breast 11:00 10 cm from the nipple posterior depth 0.7 cm irregular hypoechoic mass with angular/spiculated margins is suspicious for malignancy. BIRADS 4     Left breast US guided biopsy 7/1/2024:   IMPRESSION: ULTRASOUND GUIDED BIOPSY MALIGNANT   Ultrasound guided biopsy of the 0.7 cm mass in the left breast at 11:00 posterior  depth 10 cm from the nipple with placement of a HydroMARK T3 coil-shaped clip was successful with no apparent post procedure complications.    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  Pathology results are concordant with mammography and ultrasound findings.            Pathology:  2024  Left breast mass at 11:00 10 CMFN, US biopsy: DCIS, G2, predominately cribriform and papillary types, 0.4 cm in greatest dimension. ER 90-95%, NV 70-75%    2024  Left Lumpectomy  Ductal carcinoma in situ with prominent apocrine features, grade 2-3. Cribriform and papillary patterns; no comedo necrosis identified; calcium phosphate focal microcalcifications present; biopsy site changes associated with ductal carcinoma in situ.  All margins clear.       OB/GYN History:  Age at Menarche Onset: 11  Menopausal Status: postmenopausal, LMP: at age 49  Hysterectomy/Oophorectomy: hysterectomy without BSO, at age 22  Hormonal birth control (duration): Yes OCP (estrogen/progesterone). started at age 15 and stopped at age 18 due to blood clot  Pregnancy History:   Age at first live birth: 18  Hormone Replacement Therapy: No, none  Patient did not breast feed.  Patient denies nipple discharge.   Patient denies to previous breast biopsy.   Patient denies to a personal history of breast cancer.           Other Relevant History:  Prior thoracic RT: none  Genetic testing: No  Ashkenazi Yazidism descent: No     Family History:  Family History   Problem Relation Name Age of Onset    Cervical cancer Mother      Diabetes Mother      Hypertension Mother      No Known Problems Father      Prostate cancer Brother      Hypertension Brother      Brain cancer Child          Past History:  Past Medical History:   Diagnosis Date    Breast cancer     Depression     History of blood clots     Hypertension         Past Surgical History:   Procedure Laterality Date     APPENDECTOMY      CHOLECYSTECTOMY      RENETTA FILTER PLACEMENT      2015    HYSTERECTOMY      KNEE SURGERY Left     MASTECTOMY, PARTIAL Left 7/22/2024    Procedure: MASTECTOMY, PARTIAL - left  w/ radiological marker;  Surgeon: Deirdre Welch MD;  Location: [facility];  Service: General;  Laterality: Left;    NECK SURGERY      PLACEMENT, INFERIOR VENA CAVA FILTER Left     SHOULDER ARTHROSCOPY W/ ROTATOR CUFF REPAIR Left     STENT PLACEMENT/PRIOR TO CALIN Left 2015        Social History     Socioeconomic History    Marital status:    Tobacco Use    Smoking status: Never    Smokeless tobacco: Never   Substance and Sexual Activity    Alcohol use: Never    Drug use: Never     Social Determinants of Health     Financial Resource Strain: Low Risk  (11/14/2023)    Received from [facility]    Overall Financial Resource Strain (CARDIA)     Difficulty of Paying Living Expenses: Not hard at all   Food Insecurity: No Food Insecurity (11/14/2023)    Received from [facility] Subsidi[facility] and [facility]    Hunger Vital Sign     Worried About Running Out of Food in the Last Year: Never true     Ran Out of Food in the Last Year: Never true        Body mass index is 34.97 kg/m².     Allergy/Medications:   Review of patient's allergies indicates:   Allergen Reactions    Amitriptyline Other (See Comments)     Other Reaction(s): muscle tightening    MUSCLE WEAKNESS    Adhesive Rash    Enoxaparin Rash    Methocarbamol Rash and Other (See Comments)     MUSCLE WEAKNESS    Rivaroxaban Rash    Warfarin Rash          Current Outpatient Medications:     acetaminophen-codeine 300-30mg (TYLENOL #3) 300-30 mg Tab, Take 1 tablet by mouth as needed., Disp: , Rfl:     amLODIPine (NORVASC) 10 MG tablet, Take 10 mg by mouth., Disp: , Rfl:     buPROPion (WELLBUTRIN XL) 300 MG 24 hr tablet, TAKE 1 TABLET BY MOUTH EVERY  "MORNING FOR DEPRESSION, Disp: , Rfl:     clopidogreL (PLAVIX) 75 mg tablet, , Disp: , Rfl:     furosemide (LASIX) 40 MG tablet, , Disp: , Rfl:     irbesartan (AVAPRO) 300 MG tablet, Take 300 mg by mouth., Disp: , Rfl:     rosuvastatin (CRESTOR) 10 MG tablet, Take 10 mg by mouth., Disp: , Rfl:        Review of Systems:  ROS        Objective:     Vitals:  Blood pressure 134/81, pulse 79, temperature 97.5 °F (36.4 °C), temperature source Oral, resp. rate 18, height 5' 7" (1.702 m), weight 101.3 kg (223 lb 4.8 oz), SpO2 99%.      Physical Exam:  General: The patient is awake, alert and oriented times three. The patient is well nourished and in no acute distress.   Musculoskeletal: The patient has a normal range of motion of her bilateral upper extremities.   Breast:   Left: Examination of the left breast reveals a well healing incision of the some resolving ecchymosis and some swelling still present.  Integumentary: no rashes or skin lesions present  Neurologic: cranial nerves intact, no signs of peripheral neurological deficit, motor/sensory function intact    Assessment and Plan:      Cancer Staging   Ductal carcinoma in situ (DCIS) of left breast  Staging form: Breast, AJCC 8th Edition  - Clinical stage from 7/9/2024: Stage 0 (cTis (DCIS), cN0, cM0, ER+, FL+, HER2: Not Assessed) - Signed by Deirdre Welch MD on 7/9/2024  - Pathologic stage from 7/30/2024: Stage 0 (pTis (DCIS), cN0, cM0, ER+, FL+, HER2: Not Assessed) - Signed by Deirdre Welch MD on 7/30/2024      Encounter Diagnoses   Name Primary?    Ductal carcinoma in situ (DCIS) of left breast Yes        Her pathology results were discussed in detail.    Her next steps and follow-up were discussed in detail.       Plan:     Problem List Items Addressed This Visit          Oncology    Ductal carcinoma in situ (DCIS) of left breast - Primary    Current Assessment & Plan     Medical Oncology Referral - re: adjuvant therapy  Radiation Oncology Referral - re: " adjuvant post-lumpectomy radiation therapy  Clinical follow-up recommended in 3 months  Recommend BL DG MG - due April 2025                All of questions were answered    Deirdre Welch MD  Breast Surgical Oncology     OFFICE VISIT CODING:  Post-Op Visit

## 2024-07-29 NOTE — TELEPHONE ENCOUNTER
Patient called with her pathology results.      ----- Message from Deirdre Welch MD sent at 7/29/2024 10:33 AM CDT -----  Please call the patient and inform pathology results revealed the cancer was completely removed. Further discussion will be had at their post-op/follow-up appointment.

## 2024-07-30 ENCOUNTER — OFFICE VISIT (OUTPATIENT)
Dept: SURGERY | Facility: CLINIC | Age: 64
End: 2024-07-30
Payer: MEDICARE

## 2024-07-30 VITALS
HEART RATE: 79 BPM | SYSTOLIC BLOOD PRESSURE: 134 MMHG | DIASTOLIC BLOOD PRESSURE: 81 MMHG | OXYGEN SATURATION: 99 % | WEIGHT: 223.31 LBS | BODY MASS INDEX: 35.05 KG/M2 | HEIGHT: 67 IN | TEMPERATURE: 98 F | RESPIRATION RATE: 18 BRPM

## 2024-07-30 DIAGNOSIS — Z85.3 PERSONAL HISTORY OF BREAST CANCER: ICD-10-CM

## 2024-07-30 DIAGNOSIS — D05.12 DUCTAL CARCINOMA IN SITU (DCIS) OF LEFT BREAST: Primary | ICD-10-CM

## 2024-07-30 PROCEDURE — 99214 OFFICE O/P EST MOD 30 MIN: CPT | Mod: PBBFAC | Performed by: SURGERY

## 2024-07-30 PROCEDURE — 99024 POSTOP FOLLOW-UP VISIT: CPT | Mod: POP,,, | Performed by: SURGERY

## 2024-07-30 PROCEDURE — 99999 PR PBB SHADOW E&M-EST. PATIENT-LVL IV: CPT | Mod: PBBFAC,,, | Performed by: SURGERY

## 2024-07-30 RX ORDER — ROSUVASTATIN CALCIUM 10 MG/1
10 TABLET, COATED ORAL
COMMUNITY
Start: 2024-07-17

## 2024-07-30 NOTE — ASSESSMENT & PLAN NOTE
Medical Oncology Referral - re: adjuvant therapy  Radiation Oncology Referral - re: adjuvant post-lumpectomy radiation therapy  Clinical follow-up recommended in 3 months  Recommend BL DG MG - due April 2025

## 2024-08-14 NOTE — PROGRESS NOTES
"  Referring physician: Dr. Deirdre Welch  Reason for referral: DCIS      Subjective:       Patient ID: Barbara Alvarez is a 64 y.o. female.    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   Surgery/pathology:  Left partial mastectomy done 7/22/24--DCIS, nuclear Grade 2-3, with prominent apocrine features, no necrosis, measures 10mm, cribriform and papillary patterns, no comedonecrosis, calcium phosphate focal microcalcifications present, biopsy site changes associated with DCIS, margins negative.   Imaging:  Screening MMG 4/29/24--oval mass in the 12:00 left breast, posterior depth.  No other significant masses, calcifications, or other findings are seen in either breast.  BIRADS 0, needs additional evaluation.  Left Diagnostic MMG/US done 6/10/24--Left breast 11:00 10 cm from the nipple posterior depth 0.7 cm irregular hypoechoic mass with angular/spiculated margins is suspicious for malignancy.  BIRADS 4, biopsy recommended.    DEXA:  4/26/24--Lumbar vertebrae T = 0.4, left femoral neck -1.6, right femoral neck -1.4, c/w osteopenia.     Treatment history:   Left breast lumpectomy 7/22/24.    Current treatment plan:  Adjuvant radiation  Adjuvant Arimidex X 5 years    Chief Complaint: Other Misc (NPO. Pt reports a "erick horse" feeling in her L breast. This pain started yesterday and pt has not notified her surgeon. )    HPI  63 yo female found on screening MMG to have an oval mass in 12:00 left breast, posterior depth, needing further evaluation. Patient underwent diagnostic MMG/US and showed left breast 11:00 7mm mass, suspicious. Patient underwent biopsy with findings of DCIS, grade 2 ER and MT positive. Patient was referred to Dr. Welch. She underwent left breast lumpectomy with findings of Grade 2-3 DCIS, measuring 10mm, margins " clear. Patient has been referred to me for recommendations for adjuvant treatment. She reports menarche age 11, , +OCPs, stopped age 18 due to DVT, age at first live birth 18, menopause age 49, no HRT. FH mother with cervical cancer, brother with prostate cancer, child with brain cancer,  at age 6, likely medulloblastoma.   Of note, patient has h/o DVT after her first pregnancy and was taken off birth control, had recurrence of DVT after neck surgery in E and has h/o IVC filter placement and she is currently on Plavix.  Unable to take Coumadin, Lovenox or DOAC due to allergy, got rash. Genetic testing has not been done.  Patient presents for initial visit. She is doing well since surgery, has some mild soreness to left breast. She will be meeting with radiation oncology next week. I discussed starting Arimidex after radiation is done and she is in agreement.       Past Medical History:   Diagnosis Date    Breast cancer     Depression     History of blood clots     Hypertension       Past Surgical History:   Procedure Laterality Date    APPENDECTOMY      CHOLECYSTECTOMY      RENETTA FILTER PLACEMENT          HYSTERECTOMY      KNEE SURGERY Left     MASTECTOMY, PARTIAL Left 2024    Procedure: MASTECTOMY, PARTIAL - left  w/ radiological marker;  Surgeon: Deirdre Welch MD;  Location: [facility];  Service: General;  Laterality: Left;    NECK SURGERY      PLACEMENT, INFERIOR VENA CAVA FILTER Left     SHOULDER ARTHROSCOPY W/ ROTATOR CUFF REPAIR Left     STENT PLACEMENT/PRIOR TO CALIN Left      Family History   Problem Relation Name Age of Onset    Cervical cancer Mother      Diabetes Mother      Hypertension Mother      No Known Problems Father      Prostate cancer Brother      Hypertension Brother      Brain cancer Child       Social History     Socioeconomic History    Marital status:    Tobacco Use    Smoking status: Never    Smokeless tobacco: Never   Substance and Sexual Activity     Alcohol use: Never    Drug use: Never     Social Determinants of Health     Financial Resource Strain: Low Risk  (11/14/2023)    Received from [facility]    Overall Financial Resource Strain (CARDIA)     Difficulty of Paying Living Expenses: Not hard at all   Food Insecurity: No Food Insecurity (11/14/2023)    Received from [facility]    Hunger Vital Sign     Worried About Running Out of Food in the Last Year: Never true     Ran Out of Food in the Last Year: Never true       Review of patient's allergies indicates:   Allergen Reactions    Amitriptyline Other (See Comments)     Other Reaction(s): muscle tightening    MUSCLE WEAKNESS    Adhesive Rash    Enoxaparin Rash    Methocarbamol Rash and Other (See Comments)     MUSCLE WEAKNESS    Rivaroxaban Rash    Warfarin Rash      Current Outpatient Medications on File Prior to Visit   Medication Sig Dispense Refill    amLODIPine (NORVASC) 10 MG tablet Take 10 mg by mouth.      buPROPion (WELLBUTRIN XL) 300 MG 24 hr tablet TAKE 1 TABLET BY MOUTH EVERY MORNING FOR DEPRESSION      clopidogreL (PLAVIX) 75 mg tablet       furosemide (LASIX) 40 MG tablet       irbesartan (AVAPRO) 300 MG tablet Take 300 mg by mouth.      rosuvastatin (CRESTOR) 10 MG tablet Take 10 mg by mouth.      acetaminophen-codeine 300-30mg (TYLENOL #3) 300-30 mg Tab Take 1 tablet by mouth as needed. (Patient not taking: Reported on 8/19/2024)       No current facility-administered medications on file prior to visit.      Review of Systems   Constitutional:  Positive for appetite change. Negative for unexpected weight change.   HENT:  Negative for mouth sores.    Eyes:  Negative for visual disturbance.   Respiratory:  Positive for shortness of breath. Negative for cough.    Cardiovascular:  Positive for chest pain.   Gastrointestinal:  Negative for abdominal  "pain and diarrhea.   Genitourinary:  Negative for frequency.   Musculoskeletal:  Negative for back pain.   Integumentary:  Negative for rash.   Neurological:  Negative for headaches.   Hematological:  Negative for adenopathy.   Psychiatric/Behavioral:  The patient is nervous/anxious.             Vitals:    08/19/24 1403   BP: 135/84   Pulse: 76   Resp: 14   Temp: 98.2 °F (36.8 °C)   TempSrc: Oral   SpO2: 97%   Weight: 103.7 kg (228 lb 9.6 oz)   Height: 5' 7" (1.702 m)      Physical Exam  Constitutional:       Appearance: Normal appearance.   HENT:      Head: Normocephalic.      Nose: Nose normal.      Mouth/Throat:      Mouth: Mucous membranes are moist.   Eyes:      Extraocular Movements: Extraocular movements intact.      Conjunctiva/sclera: Conjunctivae normal.   Cardiovascular:      Rate and Rhythm: Normal rate and regular rhythm.   Pulmonary:      Effort: Pulmonary effort is normal.      Breath sounds: Normal breath sounds.   Chest:      Comments: Left breast with some swelling upper breast, incision healed.   Abdominal:      General: Bowel sounds are normal. There is no distension.      Palpations: Abdomen is soft.      Tenderness: There is no abdominal tenderness.   Musculoskeletal:         General: Normal range of motion.   Skin:     General: Skin is warm.   Neurological:      General: No focal deficit present.      Mental Status: She is alert and oriented to person, place, and time.   Psychiatric:         Mood and Affect: Mood normal.         Judgment: Judgment normal.         Admission on 07/22/2024, Discharged on 07/22/2024   Component Date Value    Pathology Result 07/22/2024     Lab Visit on 07/09/2024   Component Date Value    Sodium 07/09/2024 143     Potassium 07/09/2024 3.7     Chloride 07/09/2024 108 (H)     CO2 07/09/2024 25     Glucose 07/09/2024 92     Blood Urea Nitrogen 07/09/2024 15.3     Creatinine 07/09/2024 0.91     Calcium 07/09/2024 9.6     Protein Total 07/09/2024 7.3     Albumin " 07/09/2024 4.0     Globulin 07/09/2024 3.3     Albumin/Globulin Ratio 07/09/2024 1.2     Bilirubin Total 07/09/2024 0.8     ALP 07/09/2024 128     ALT 07/09/2024 11     AST 07/09/2024 12     eGFR 07/09/2024 >60     Anion Gap 07/09/2024 10.0     BUN/Creatinine Ratio 07/09/2024 17     QRS Duration 07/09/2024 96     OHS QTC Calculation 07/09/2024 461     WBC 07/09/2024 8.68     RBC 07/09/2024 4.46     Hgb 07/09/2024 12.6     Hct 07/09/2024 40.4     MCV 07/09/2024 90.6     MCH 07/09/2024 28.3     MCHC 07/09/2024 31.2 (L)     RDW 07/09/2024 13.2     Platelet 07/09/2024 396     MPV 07/09/2024 9.7     Neut % 07/09/2024 68.3     Lymph % 07/09/2024 24.0     Mono % 07/09/2024 5.5     Eos % 07/09/2024 1.5     Basophil % 07/09/2024 0.5     Lymph # 07/09/2024 2.08     Neut # 07/09/2024 5.93     Mono # 07/09/2024 0.48     Eos # 07/09/2024 0.13     Baso # 07/09/2024 0.04     IG# 07/09/2024 0.02     IG% 07/09/2024 0.2     NRBC% 07/09/2024 0.0    Hospital Outpatient Visit on 06/20/2024   Component Date Value    Pathology Result 06/20/2024             Assessment:       1. Ductal carcinoma in situ (DCIS) of left breast         Plan:       Patient with Left breast DCIS, s/p lumpectomy done 7/22/24. DCIS measured 10mm, Grade 2-3, ER and NC strongly positive.   Recommend treatment with adjuvant radiation and endocrine therapy X 5 years. Explained rationale for treatment.     Patient meeting with radiation oncology next week.     Will have patient RTC 6 weeks for follow-up.  Plan to begin Arimidex once radiation is done.    I discussed most common side effects of aromatase inhibitor treatment including, but not limited to hot flashes, vaginal dryness, arthralgias and bone thinning.  Patient given information from [employer] on Arimidex.    Of note, patient is not a candidate for Tamoxifen. Has h/o recurrent DVT LLE, first in pregnancy and 2nd after neck surgery. Sees Dr. Omer and had stents placed, assume she had May Thurner syndrome.   She got a rash with Lovenox, Coumadin and Xarelto. I suppose if she gets another blood clot, could try Eliquis.  Remains on Plavix for the stents.    DEXA from 4/2024 with osteopenia. Once stabilized on AI, will need to be on Fosamax or Prolia.     Refer for genetic testing due to FH brother prostate cancer, child with brain tumor.    All questions answered at this time.    I have reviewed records from patient's DCIS diagnosis including biopsy/operative reports, pathology reports, imaging studies, and pathology special testing.  Total time spent reviewing records, current NCCN guidelines, as well as face-to-face interaction with patient and was >60 minutes.     Leah Rivera MD

## 2024-08-16 ENCOUNTER — TELEPHONE (OUTPATIENT)
Dept: HEMATOLOGY/ONCOLOGY | Facility: CLINIC | Age: 64
End: 2024-08-16
Payer: MEDICARE

## 2024-08-16 NOTE — TELEPHONE ENCOUNTER
Spoke with patient regarding new patient appointment 8/19/24 with Dr. Rivera. Patient denied concerns or questions at this time. Confirmed appointment date, time and location with patient.

## 2024-08-19 ENCOUNTER — OFFICE VISIT (OUTPATIENT)
Dept: HEMATOLOGY/ONCOLOGY | Facility: CLINIC | Age: 64
End: 2024-08-19
Payer: MEDICARE

## 2024-08-19 VITALS
WEIGHT: 228.63 LBS | HEART RATE: 76 BPM | SYSTOLIC BLOOD PRESSURE: 135 MMHG | DIASTOLIC BLOOD PRESSURE: 84 MMHG | HEIGHT: 67 IN | OXYGEN SATURATION: 97 % | TEMPERATURE: 98 F | RESPIRATION RATE: 14 BRPM | BODY MASS INDEX: 35.88 KG/M2

## 2024-08-19 DIAGNOSIS — D05.12 DUCTAL CARCINOMA IN SITU (DCIS) OF LEFT BREAST: Primary | ICD-10-CM

## 2024-08-19 PROCEDURE — 99999 PR PBB SHADOW E&M-EST. PATIENT-LVL V: CPT | Mod: PBBFAC,,, | Performed by: INTERNAL MEDICINE

## 2024-08-19 PROCEDURE — 99204 OFFICE O/P NEW MOD 45 MIN: CPT | Mod: S$PBB,,, | Performed by: INTERNAL MEDICINE

## 2024-08-19 PROCEDURE — 99215 OFFICE O/P EST HI 40 MIN: CPT | Mod: PBBFAC | Performed by: INTERNAL MEDICINE

## 2024-08-26 ENCOUNTER — CLINICAL SUPPORT (OUTPATIENT)
Dept: RADIATION THERAPY | Facility: HOSPITAL | Age: 64
End: 2024-08-26
Attending: RADIOLOGY
Payer: MEDICARE

## 2024-08-26 DIAGNOSIS — D05.12 DUCTAL CARCINOMA IN SITU (DCIS) OF LEFT BREAST: ICD-10-CM

## 2024-08-26 PROCEDURE — 77332 RADIATION TREATMENT AID(S): CPT | Performed by: RADIOLOGY

## 2024-08-26 PROCEDURE — 77334 RADIATION TREATMENT AID(S): CPT | Performed by: RADIOLOGY

## 2024-08-26 PROCEDURE — 77290 THER RAD SIMULAJ FIELD CPLX: CPT | Performed by: RADIOLOGY

## 2024-08-27 ENCOUNTER — OFFICE VISIT (OUTPATIENT)
Dept: HEMATOLOGY/ONCOLOGY | Facility: CLINIC | Age: 64
End: 2024-08-27
Payer: MEDICARE

## 2024-08-27 DIAGNOSIS — Z80.42 FAMILY HISTORY OF PROSTATE CANCER: Primary | ICD-10-CM

## 2024-08-27 DIAGNOSIS — D05.12 DUCTAL CARCINOMA IN SITU (DCIS) OF LEFT BREAST: ICD-10-CM

## 2024-08-27 NOTE — PROGRESS NOTES
REFERRING PROVIDER: Dr. Silverio      Patient ID: Barbara Alvarez is a 64 y.o. female.    Chief Complaint: Personal recent history of breast cancer (left DCIS ER/AL+) and a family history of cancer. Patient presented via Telemedicine Visit (audio only) today for risk assessment, genetic counseling, and consideration for genetic testing.    HPI  Past Medical History:   Diagnosis Date    Breast cancer     Depression     History of blood clots     Hypertension     Lipoma of anterior chest wall         Past Surgical History:   Procedure Laterality Date    APPENDECTOMY      CHOLECYSTECTOMY      RENETTA FILTER PLACEMENT      2015    HYSTERECTOMY      KNEE SURGERY Left     MASTECTOMY, PARTIAL Left 7/22/2024    Procedure: MASTECTOMY, PARTIAL - left  w/ radiological marker;  Surgeon: Deirdre Welch MD;  Location: [facility];  Service: General;  Laterality: Left;    NECK SURGERY      PLACEMENT, INFERIOR VENA CAVA FILTER Left     SHOULDER ARTHROSCOPY W/ ROTATOR CUFF REPAIR Left     STENT PLACEMENT/PRIOR TO CALIN Left 2015        Review of patient's allergies indicates:  Amitriptyline, Adhesive, Enoxaparin, Methocarbamol, Rivaroxaban, and Warfarin     Review of Systems        Problem List Items Addressed This Visit    None       Oncology History    No history exists.      Family History   Problem Relation Name Age of Onset    Cervical cancer Mother      Diabetes Mother      Hypertension Mother      Prostate cancer Father  79    Prostate cancer Brother  68    Hypertension Brother      Brain cancer Child  6    Cancer Maternal Uncle      Cancer Maternal Uncle      Cancer Maternal Uncle              Assessment:   Risk Assessment:  This patient is at increased risk of having an inherited genetic mutation that increases the risk for cancer. Patient meets criteria for genetic testing based on the National Comprehensive Cancer Network (NCCN) criteria due to a personal history of breast cancer and a family history that includes  prostate cancer (brother, father) (see family history and pedigree). Based on the likelihood of having a mutation, BRCA1/2 Analysis with [employer] CancerNext-Expanded+RNAinsight panel testing was described in detail.    Education and Counseling:  [employer]-Expanded evaluates a broad number of hereditary cancer syndromes to help define patients' cancer risk. This cancer panel tests (71 total): AIP, ALK, APC, ASHELY, BAP1, BARD1, BMPR1A, BRCA1, BRCA2, BRIP1, CDC73, CDH1, CDK4, CDKN1B, CDKN2A, CHEK2, DICER1, FH, FLCN, KIF1B, LZTR1, MAX, MEN1, MET, MLH1, MSH2, MSH6, MUTYH, NF1, NF2, NTHL1, PALB2, PHOX2B, PMS2, POT1, QHFHG4B, PTCH1, PTEN, RAD51C, RAD51D, RB1, RET, SDHA, SDHAF2, SDHB, SDHC, SDHD, SMAD4, SMARCA4, SMARCB1, SMARCE1, STK11, SUFU, VFWO427, TP53, TSC1, TSC2 and VHL (sequencing and deletion/duplication); AXIN2, CTNNA1, EGFR, EGLN1, HOXB13, KIT, MITF, MSH3, PDGFRA, POLD1 and POLE (sequencing only); EPCAM and GREM1 (deletion/duplication only). RNA data is routinely analyzed for use in variant interpretation for all genes.     Risks of cancer associated with inherited cancer predisposition mutations were discussed in detail.  If a mutation were found, this patient would have a significantly increased risk for cancer.  Inherited cancer syndromes included in this test, may have different, but still significant risk for cancer.  Risk of cancer with any particular gene mutation will be discussed at the time of results disclosure and based on the results.    The availability of clinical management options for inherited cancer predisposition mutation carriers was discussed, including increased surveillance, chemoprevention, and prophylactic surgery. Details of the testing process, including benefits and limitations of genetic analysis as well as the implications of possible test results, were discussed.  Because this patient is the first member of the family to be tested comprehensive panel testing was  presented.  Related insurance issues were discussed.      Summary:  This patient was evaluated for hereditary risk of cancer and was found to be at an increased risk of having an inherited cancer predisposition gene mutation.  The option of genetic testing was explained in detail, including the possible impact of this information on family members.  Since this patient wishes to proceed with testing an informed consent was obtained and blood drawn and sent to [facility].  Results will be expected 4 weeks from this time.  A follow-up appointment will be scheduled for results disclosure.       The patient location is: home.     Visit type: audio only    Time with patient: 20 minutes  >40 minutes of total time spent on the encounter, which includes face to face time and non-face to face time preparing to see the patient (eg, review of tests), Obtaining and/or reviewing separately obtained history, Documenting clinical information in the electronic or other health record, Independently interpreting results (not separately reported) and communicating results to the patient/family/caregiver, or Care coordination (not separately reported).       Each patient to whom he or she provides medical services by telemedicine is:  (1) informed of the relationship between the physician and patient and the respective role of any other health care provider with respect to management of the patient; and (2) notified that he or she may decline to receive medical services by telemedicine and may withdraw from such care at any time.    KVNG WALLACE, PhD

## 2024-08-29 PROCEDURE — 77334 RADIATION TREATMENT AID(S): CPT | Performed by: RADIOLOGY

## 2024-08-29 PROCEDURE — 77295 3-D RADIOTHERAPY PLAN: CPT | Performed by: RADIOLOGY

## 2024-08-29 PROCEDURE — 77300 RADIATION THERAPY DOSE PLAN: CPT | Performed by: RADIOLOGY

## 2024-09-03 ENCOUNTER — CLINICAL SUPPORT (OUTPATIENT)
Dept: RADIATION THERAPY | Facility: HOSPITAL | Age: 64
End: 2024-09-03
Attending: RADIOLOGY
Payer: MEDICARE

## 2024-09-03 PROCEDURE — 77412 RADIATION TX DELIVERY LVL 3: CPT | Performed by: RADIOLOGY

## 2024-09-03 PROCEDURE — 77417 THER RADIOLOGY PORT IMAGE(S): CPT | Performed by: RADIOLOGY

## 2024-09-04 PROCEDURE — 77412 RADIATION TX DELIVERY LVL 3: CPT | Performed by: RADIOLOGY

## 2024-09-05 PROCEDURE — 77412 RADIATION TX DELIVERY LVL 3: CPT | Performed by: RADIOLOGY

## 2024-09-06 PROCEDURE — 77412 RADIATION TX DELIVERY LVL 3: CPT | Performed by: RADIOLOGY

## 2024-09-09 PROCEDURE — 77336 RADIATION PHYSICS CONSULT: CPT | Performed by: RADIOLOGY

## 2024-09-09 PROCEDURE — 77412 RADIATION TX DELIVERY LVL 3: CPT | Performed by: RADIOLOGY

## 2024-09-10 PROCEDURE — 77412 RADIATION TX DELIVERY LVL 3: CPT | Performed by: RADIOLOGY

## 2024-09-10 PROCEDURE — 77417 THER RADIOLOGY PORT IMAGE(S): CPT | Performed by: RADIOLOGY

## 2024-09-12 PROCEDURE — 77412 RADIATION TX DELIVERY LVL 3: CPT | Performed by: RADIOLOGY

## 2024-09-13 PROCEDURE — 77412 RADIATION TX DELIVERY LVL 3: CPT | Performed by: RADIOLOGY

## 2024-09-16 PROCEDURE — 77336 RADIATION PHYSICS CONSULT: CPT | Performed by: RADIOLOGY

## 2024-09-16 PROCEDURE — 77412 RADIATION TX DELIVERY LVL 3: CPT | Performed by: RADIOLOGY

## 2024-09-17 PROCEDURE — 77412 RADIATION TX DELIVERY LVL 3: CPT | Performed by: RADIOLOGY

## 2024-09-18 PROCEDURE — 77417 THER RADIOLOGY PORT IMAGE(S): CPT | Performed by: RADIOLOGY

## 2024-09-18 PROCEDURE — 77412 RADIATION TX DELIVERY LVL 3: CPT | Performed by: RADIOLOGY

## 2024-09-19 PROCEDURE — 77412 RADIATION TX DELIVERY LVL 3: CPT | Performed by: RADIOLOGY

## 2024-09-20 PROCEDURE — 77412 RADIATION TX DELIVERY LVL 3: CPT | Performed by: RADIOLOGY

## 2024-09-23 PROBLEM — G89.29 CHRONIC NECK PAIN: Status: ACTIVE | Noted: 2024-09-23

## 2024-09-23 PROBLEM — M54.2 CHRONIC NECK PAIN: Status: ACTIVE | Noted: 2024-09-23

## 2024-09-23 PROBLEM — E11.9 DIABETES MELLITUS: Status: ACTIVE | Noted: 2024-09-23

## 2024-09-23 PROBLEM — E66.9 OBESITY: Status: ACTIVE | Noted: 2024-09-23

## 2024-09-23 PROCEDURE — 77336 RADIATION PHYSICS CONSULT: CPT | Performed by: RADIOLOGY

## 2024-09-23 PROCEDURE — 77412 RADIATION TX DELIVERY LVL 3: CPT | Performed by: RADIOLOGY

## 2024-09-23 NOTE — PROGRESS NOTES
Referring physician: Dr. Deirdre Welch  Reason for referral: DCIS      Subjective:       Patient ID: Barbara Alvarez is a 64 y.o. female.    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   Surgery/pathology:  Left partial mastectomy done 7/22/24--DCIS, nuclear Grade 2-3, with prominent apocrine features, no necrosis, measures 10mm, cribriform and papillary patterns, no comedonecrosis, calcium phosphate focal microcalcifications present, biopsy site changes associated with DCIS, margins negative.   Imaging:  Screening MMG 4/29/24--oval mass in the 12:00 left breast, posterior depth.  No other significant masses, calcifications, or other findings are seen in either breast.  BIRADS 0, needs additional evaluation.  Left Diagnostic MMG/US done 6/10/24--Left breast 11:00 10 cm from the nipple posterior depth 0.7 cm irregular hypoechoic mass with angular/spiculated margins is suspicious for malignancy.  BIRADS 4, biopsy recommended.    DEXA:  4/26/24--Lumbar vertebrae T = 0.4, left femoral neck -1.6, right femoral neck -1.4, c/w osteopenia.     Treatment history:   Left breast lumpectomy 7/22/24.    Current treatment plan:  Adjuvant radiation completed on 10/1/24.   Adjuvant Arimidex x 5 years.     Chief Complaint: Other Misc (Pt reports pain and burning at XRT site.)    HPI  The patient presents for scheduled follow-up for her DCIS. She is doing well. She will complete adjuvant radiation tomorrow, tolerating well. We discussed starting Arimidex once she has completed radiation and she is in agreement. Her biggest concern is weight gain. No other problems reported.     Past Medical History:   Diagnosis Date    Breast cancer     Depression     History of blood clots     Hypertension     Lipoma of anterior chest wall       Past Surgical  History:   Procedure Laterality Date    APPENDECTOMY      CHOLECYSTECTOMY      RENETTA FILTER PLACEMENT          HYSTERECTOMY      KNEE SURGERY Left     MASTECTOMY, PARTIAL Left 2024    Procedure: MASTECTOMY, PARTIAL - left  w/ radiological marker;  Surgeon: Deirdre Welch MD;  Location: [facility];  Service: General;  Laterality: Left;    NECK SURGERY      PLACEMENT, INFERIOR VENA CAVA FILTER Left     SHOULDER ARTHROSCOPY W/ ROTATOR CUFF REPAIR Left     STENT PLACEMENT/PRIOR TO CALIN Left    Patient has h/o DVT after her first pregnancy and was taken off birth control, had recurrence of DVT after neck surgery in LLE and has h/o IVC filter placement and she is currently on Plavix.  Unable to take Coumadin, Lovenox or DOAC due to allergy, got rash.   Family History   Problem Relation Name Age of Onset    Cervical cancer Mother      Diabetes Mother      Hypertension Mother      Prostate cancer Father  79    Prostate cancer Brother  68    Hypertension Brother      Brain cancer Child  6    Cancer Maternal Uncle      Cancer Maternal Uncle      Cancer Maternal Uncle       Social History     Socioeconomic History    Marital status:    Tobacco Use    Smoking status: Never    Smokeless tobacco: Never   Substance and Sexual Activity    Alcohol use: Never    Drug use: Never     Social Drivers of Health     Financial Resource Strain: Low Risk  (2023)    Received from [facility] Subsidiaries and Affiliates    Overall Financial Resource Strain (CARDIA)     Difficulty of Paying Living Expenses: Not hard at all   Food Insecurity: No Food Insecurity (2023)    Received from [facility] and [facility]    Hunger Vital Sign     Worried About Running Out of Food in the Last Year: Never true     Ran Out of Food in the Last Year: Never true   Menarche age 11, , +OCPs, stopped age 18 due to DVT, age  at first live birth 18, menopause age 49, no HRT. FH mother with cervical cancer, brother with prostate cancer, child with brain cancer,  at age 6, likely medulloblastoma.     Review of patient's allergies indicates:   Allergen Reactions    Amitriptyline Other (See Comments)     Other Reaction(s): muscle tightening    MUSCLE WEAKNESS    Adhesive Rash    Enoxaparin Rash    Methocarbamol Rash and Other (See Comments)     MUSCLE WEAKNESS    Rivaroxaban Rash    Warfarin Rash      Current Outpatient Medications on File Prior to Visit   Medication Sig Dispense Refill    amLODIPine (NORVASC) 10 MG tablet Take 10 mg by mouth.      buPROPion (WELLBUTRIN XL) 300 MG 24 hr tablet TAKE 1 TABLET BY MOUTH EVERY MORNING FOR DEPRESSION      clopidogreL (PLAVIX) 75 mg tablet       furosemide (LASIX) 40 MG tablet       irbesartan (AVAPRO) 300 MG tablet Take 300 mg by mouth.      rosuvastatin (CRESTOR) 10 MG tablet Take 10 mg by mouth.      acetaminophen-codeine 300-30mg (TYLENOL #3) 300-30 mg Tab Take 1 tablet by mouth as needed. (Patient not taking: Reported on 2024)       No current facility-administered medications on file prior to visit.      Review of Systems   Constitutional:  Positive for appetite change. Negative for unexpected weight change.   HENT:  Negative for mouth sores.    Eyes:  Negative for visual disturbance.   Respiratory:  Positive for shortness of breath. Negative for cough.    Cardiovascular:  Positive for chest pain.   Gastrointestinal:  Negative for abdominal pain and diarrhea.   Genitourinary:  Negative for frequency.   Musculoskeletal:  Negative for back pain.   Integumentary:  Negative for rash.        Pain from XRT   Neurological:  Negative for headaches.   Hematological:  Negative for adenopathy.   Psychiatric/Behavioral:  The patient is nervous/anxious.          Vitals:    24 1031   BP: (!) 140/80   Pulse: 71   Resp: 14   Temp: 98.1 °F (36.7 °C)   TempSrc: Oral   SpO2: 99%   Weight: 105.6  "kg (232 lb 12.8 oz)   Height: 5' 7" (1.702 m)      Physical Exam  Constitutional:       Appearance: Normal appearance. She is overweight.   HENT:      Head: Normocephalic.      Nose: Nose normal.      Mouth/Throat:      Mouth: Mucous membranes are moist.   Eyes:      Extraocular Movements: Extraocular movements intact.      Conjunctiva/sclera: Conjunctivae normal.   Cardiovascular:      Rate and Rhythm: Normal rate and regular rhythm.   Pulmonary:      Effort: Pulmonary effort is normal.      Breath sounds: Normal breath sounds.   Chest:      Comments: Left breast with some swelling, erythema and mild peeling from XRT.    Abdominal:      General: Bowel sounds are normal. There is no distension.      Palpations: Abdomen is soft.      Tenderness: There is no abdominal tenderness.   Musculoskeletal:         General: Normal range of motion.   Skin:     General: Skin is warm.   Neurological:      General: No focal deficit present.      Mental Status: She is alert and oriented to person, place, and time.   Psychiatric:         Mood and Affect: Mood normal.         Behavior: Behavior is cooperative.         Judgment: Judgment normal.       No visits with results within 3 Week(s) from this visit.   Latest known visit with results is:   Admission on 07/22/2024, Discharged on 07/22/2024   Component Date Value    Pathology Result 07/22/2024            Assessment:       1. Ductal carcinoma in situ (DCIS) of left breast         Plan:       Patient with Left breast DCIS, s/p lumpectomy done 7/22/24. DCIS measured 10mm, Grade 2-3, ER and NH strongly positive.   Recommend treatment with adjuvant radiation and endocrine therapy X 5 years. Explained rationale for treatment.     She will complete adjuvant radiation on 10/1/24.   Plan to begin Arimidex daily next week. Dr. Rivera previously discussed most common side effects of aromatase inhibitor treatment and we reviewed them today.   Prescription sent to her pharmacy.    DEXA from " 4/2024 with osteopenia. Once stabilized on AI, will need to be on Fosamax or Prolia. Patient prefers Prolia. She is regularly followed by a dentist. Plan to start at her next appointment.   Referred for genetic testing due to FH brother prostate cancer, child with brain tumor. Scheduled for results on 10/22/24.   Will have her follow-up in 4-6 weeks for toxicity check.     Of note, patient is not a candidate for Tamoxifen. Has h/o recurrent DVT LLE, first in pregnancy and 2nd after neck surgery. Sees Dr. Omer and had stents placed, assume she had May Thurner syndrome. She got a rash with Lovenox, Coumadin and Xarelto. I suppose if she gets another blood clot, could try Eliquis. Remains on Plavix for the stents.    All questions answered at this time.      Dorene Sanchez, [facility]

## 2024-09-24 PROCEDURE — 77412 RADIATION TX DELIVERY LVL 3: CPT | Performed by: RADIOLOGY

## 2024-09-25 PROCEDURE — 77280 THER RAD SIMULAJ FIELD SMPL: CPT | Performed by: RADIOLOGY

## 2024-09-25 PROCEDURE — 77412 RADIATION TX DELIVERY LVL 3: CPT | Performed by: RADIOLOGY

## 2024-09-26 PROCEDURE — 77412 RADIATION TX DELIVERY LVL 3: CPT | Performed by: RADIOLOGY

## 2024-09-27 PROCEDURE — 77412 RADIATION TX DELIVERY LVL 3: CPT | Performed by: RADIOLOGY

## 2024-09-30 ENCOUNTER — OFFICE VISIT (OUTPATIENT)
Dept: HEMATOLOGY/ONCOLOGY | Facility: CLINIC | Age: 64
End: 2024-09-30
Payer: MEDICARE

## 2024-09-30 VITALS
WEIGHT: 232.81 LBS | HEART RATE: 71 BPM | BODY MASS INDEX: 36.54 KG/M2 | SYSTOLIC BLOOD PRESSURE: 140 MMHG | OXYGEN SATURATION: 99 % | RESPIRATION RATE: 14 BRPM | TEMPERATURE: 98 F | HEIGHT: 67 IN | DIASTOLIC BLOOD PRESSURE: 80 MMHG

## 2024-09-30 DIAGNOSIS — M85.851 OSTEOPENIA OF BOTH HIPS: ICD-10-CM

## 2024-09-30 DIAGNOSIS — M85.852 OSTEOPENIA OF BOTH HIPS: ICD-10-CM

## 2024-09-30 DIAGNOSIS — D05.12 DUCTAL CARCINOMA IN SITU (DCIS) OF LEFT BREAST: Primary | ICD-10-CM

## 2024-09-30 PROCEDURE — 77412 RADIATION TX DELIVERY LVL 3: CPT | Performed by: RADIOLOGY

## 2024-09-30 PROCEDURE — 77336 RADIATION PHYSICS CONSULT: CPT | Performed by: RADIOLOGY

## 2024-09-30 PROCEDURE — 99999 PR PBB SHADOW E&M-EST. PATIENT-LVL III: CPT | Mod: PBBFAC,,, | Performed by: NURSE PRACTITIONER

## 2024-09-30 PROCEDURE — 99214 OFFICE O/P EST MOD 30 MIN: CPT | Mod: S$PBB,,, | Performed by: NURSE PRACTITIONER

## 2024-09-30 PROCEDURE — 99213 OFFICE O/P EST LOW 20 MIN: CPT | Mod: PBBFAC | Performed by: NURSE PRACTITIONER

## 2024-09-30 RX ORDER — ANASTROZOLE 1 MG/1
1 TABLET ORAL DAILY
Qty: 30 TABLET | Refills: 3 | Status: SHIPPED | OUTPATIENT
Start: 2024-09-30 | End: 2025-09-30

## 2024-10-01 ENCOUNTER — CLINICAL SUPPORT (OUTPATIENT)
Dept: RADIATION THERAPY | Facility: HOSPITAL | Age: 64
End: 2024-10-01
Attending: RADIOLOGY
Payer: MEDICARE

## 2024-10-01 PROCEDURE — 77412 RADIATION TX DELIVERY LVL 3: CPT | Performed by: RADIOLOGY

## 2024-10-22 ENCOUNTER — OFFICE VISIT (OUTPATIENT)
Dept: HEMATOLOGY/ONCOLOGY | Facility: CLINIC | Age: 64
End: 2024-10-22
Payer: MEDICARE

## 2024-10-22 DIAGNOSIS — D05.12 DUCTAL CARCINOMA IN SITU (DCIS) OF LEFT BREAST: Primary | ICD-10-CM

## 2024-10-22 NOTE — PROGRESS NOTES
REFERRING PROVIDER: Dr. Leah Rivera    Subjective:       Patient ID: Barbara Alvarez is a 64 y.o. female.    Chief Complaint: Personal recent history of breast cancer (left DCIS ER/AR+) and a family history of cancer. Patient presented for genetic counseling on 8/27/2024 and was found appropriate for genetic testing based on the National Comprehensive Cancer Network (NCCN) criteria due to a personal history of breast cancer and a family history that includes prostate cancer (brother, father) (see family history and pedigree). Patient signed consent, lab was drawn and sent to [facility] for CancerNext-Expanded+RNAinsight panel testing. Patient presented via Telemedicine Visit (audio only) today for results disclosure.     HPI  Past Medical History:   Diagnosis Date    Breast cancer     Depression     History of blood clots     Hypertension     Lipoma of anterior chest wall         Past Surgical History:   Procedure Laterality Date    APPENDECTOMY      CHOLECYSTECTOMY      RENETTA FILTER PLACEMENT      2015    HYSTERECTOMY      KNEE SURGERY Left     MASTECTOMY, PARTIAL Left 7/22/2024    Procedure: MASTECTOMY, PARTIAL - left  w/ radiological marker;  Surgeon: Deirdre Welch MD;  Location: [facility];  Service: General;  Laterality: Left;    NECK SURGERY      PLACEMENT, INFERIOR VENA CAVA FILTER Left     SHOULDER ARTHROSCOPY W/ ROTATOR CUFF REPAIR Left     STENT PLACEMENT/PRIOR TO CALIN Left 2015        Review of patient's allergies indicates:   Allergen Reactions    Amitriptyline Other (See Comments)     Other Reaction(s): muscle tightening    MUSCLE WEAKNESS    Adhesive Rash    Enoxaparin Rash    Methocarbamol Rash and Other (See Comments)     MUSCLE WEAKNESS    Rivaroxaban Rash    Warfarin Rash        Review of Systems            Problem List Items Addressed This Visit    None    Oncology History   Ductal carcinoma in situ (DCIS) of left breast   7/9/2024 Initial Diagnosis    Ductal carcinoma in situ (DCIS) of  left breast     7/9/2024 Cancer Staged    Staging form: Breast, AJCC 8th Edition  - Clinical stage from 7/9/2024: Stage 0 (cTis (DCIS), cN0, cM0, ER+, IA+, HER2: Not Assessed)     7/22/2024 Surgery    Left breast partial mastectomy/lumpectomy     7/29/2024 Pathology Significant Finding    Ductal Carcinoma In Situ, grade 2-3  Size: 1.0 cm  Cribriform and papillary patterns with no comedonecrosis identified  Calcium phosphate focal microcalcifications present  Biopsy site changes associated with DCIS  Margins negative     7/30/2024 Cancer Staged    Staging form: Breast, AJCC 8th Edition  - Pathologic stage from 7/30/2024: Stage 0 (pTis (DCIS), cN0, cM0, ER+, IA+, HER2: Not Assessed)              Family History   Problem Relation Name Age of Onset    Cervical cancer Mother      Diabetes Mother      Hypertension Mother      Prostate cancer Father  79    Prostate cancer Brother  68    Hypertension Brother      Brain cancer Child  6    Cancer Maternal Uncle      Cancer Maternal Uncle      Cancer Maternal Uncle          Assessment:   Genetic testing was appropriate for this patient because of personal and family history. This comprehensive [facility] CancerNext-Expanded analysis indicated that there was no deleterious mutation and no variants of uncertain significance found in (71 total): AIP, ALK, APC, ASHLEY, BAP1, BARD1, BMPR1A, BRCA1, BRCA2, BRIP1, CDC73, CDH1, CDK4, CDKN1B, CDKN2A, CHEK2, DICER1, FH, FLCN, KIF1B, LZTR1, MAX, MEN1, MET, MLH1, MSH2, MSH6, MUTYH, NF1, NF2, NTHL1, PALB2, PHOX2B, PMS2, POT1, ZKFTQ1Y, PTCH1, PTEN, RAD51C, RAD51D, RB1, RET, SDHA, SDHAF2, SDHB, SDHC, SDHD, SMAD4, SMARCA4, SMARCB1, SMARCE1, STK11, SUFU, ZGGW295, TP53, TSC1, TSC2 and VHL (sequencing and deletion/duplication); AXIN2, CTNNA1, EGFR, EGLN1, HOXB13, KIT, MITF, MSH3, PDGFRA, POLD1 and POLE (sequencing only); EPCAM and GREM1 (deletion/duplication only). RNA data is routinely analyzed for use in variant interpretation for all genes.      It was explained to the patient, that, although this analysis indicated a negative result, there are other, rare genetic abnormalities that this test will not detect. This result, however, rules out the majority of abnormalities believed to be responsible for hereditary susceptibility to cancer.    A copy of the result will be emailed to the patient: [email or web address]     The patient location is: home    Visit type: audio only    Time with patient: 10 minutes  20 minutes of total time spent on the encounter, which includes face to face time and non-face to face time preparing to see the patient (eg, review of tests), Obtaining and/or reviewing separately obtained history, Documenting clinical information in the electronic or other health record, Independently interpreting results (not separately reported) and communicating results to the patient/family/caregiver, or Care coordination (not separately reported).         Each patient to whom he or she provides medical services by telemedicine is:  (1) informed of the relationship between the physician and patient and the respective role of any other health care provider with respect to management of the patient; and (2) notified that he or she may decline to receive medical services by telemedicine and may withdraw from such care at any time.    KVNG WALLACE, PhD

## 2024-10-25 DIAGNOSIS — D05.12 DUCTAL CARCINOMA IN SITU (DCIS) OF LEFT BREAST: Primary | ICD-10-CM

## 2024-10-25 RX ORDER — LETROZOLE 2.5 MG/1
2.5 TABLET, FILM COATED ORAL DAILY
Qty: 30 TABLET | Refills: 3 | Status: SHIPPED | OUTPATIENT
Start: 2024-10-25 | End: 2025-10-25

## 2024-10-28 NOTE — PROGRESS NOTES
Referring physician: Dr. Deirdre Welch  Reason for referral: DCIS      Subjective:       Patient ID: Barbara Alvarez is a 64 y.o. female.    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   Surgery/pathology:  Left partial mastectomy done 7/22/24--DCIS, nuclear Grade 2-3, with prominent apocrine features, no necrosis, measures 10mm, cribriform and papillary patterns, no comedonecrosis, calcium phosphate focal microcalcifications present, biopsy site changes associated with DCIS, margins negative.   Imaging:  Screening MMG 4/29/24--oval mass in the 12:00 left breast, posterior depth.  No other significant masses, calcifications, or other findings are seen in either breast.  BIRADS 0, needs additional evaluation.  Left Diagnostic MMG/US done 6/10/24--Left breast 11:00 10 cm from the nipple posterior depth 0.7 cm irregular hypoechoic mass with angular/spiculated margins is suspicious for malignancy.  BIRADS 4, biopsy recommended.    DEXA:  4/26/24--Lumbar vertebrae T = 0.4, left femoral neck -1.6, right femoral neck -1.4, c/w osteopenia.     Treatment history:   Left breast lumpectomy 7/22/24.  Adjuvant radiation completed on 10/1/24.     Current treatment plan:  Arimidex daily started on 10/6/24. Stopped on 10/25/24 due to hallucinations.   Plan to start Femara tonight.     Chief Complaint: Back Pain    HPI  The patient presents for scheduled follow-up for her DCIS. She is doing well. She started Arimidex last month but called about 2 weeks ago complaining of hallucinations and feeling too scared to be alone. She was told that is not a typical side effect of Arimidex but to stop. She will start Femara tonight. Also mentions she went to the ER over the weekend with right sided rib pain. She was diagnosed with costochondritis and discharged  home with pain medications. I recommended she reach out to her PCP for follow-up. No other problems reported today.     Past Medical History:   Diagnosis Date    Breast cancer     Depression     History of blood clots     Hypertension     Lipoma of anterior chest wall       Past Surgical History:   Procedure Laterality Date    APPENDECTOMY      CHOLECYSTECTOMY      RENETTA FILTER PLACEMENT      2015    HYSTERECTOMY      KNEE SURGERY Left     MASTECTOMY, PARTIAL Left 7/22/2024    Procedure: MASTECTOMY, PARTIAL - left  w/ radiological marker;  Surgeon: Deirdre Welch MD;  Location: [facility];  Service: General;  Laterality: Left;    NECK SURGERY      PLACEMENT, INFERIOR VENA CAVA FILTER Left     SHOULDER ARTHROSCOPY W/ ROTATOR CUFF REPAIR Left     STENT PLACEMENT/PRIOR TO CALIN Left 2015   Patient has h/o DVT after her first pregnancy and was taken off birth control, had recurrence of DVT after neck surgery in E and has h/o IVC filter placement and she is currently on Plavix.  Unable to take Coumadin, Lovenox or DOAC due to allergy, got rash.   Family History   Problem Relation Name Age of Onset    Cervical cancer Mother      Diabetes Mother      Hypertension Mother      Prostate cancer Father  79    Prostate cancer Brother  68    Hypertension Brother      Brain cancer Child  6    Cancer Maternal Uncle      Cancer Maternal Uncle      Cancer Maternal Uncle       Social History     Socioeconomic History    Marital status:    Tobacco Use    Smoking status: Never    Smokeless tobacco: Never   Substance and Sexual Activity    Alcohol use: Never    Drug use: Never     Social Drivers of Health     Financial Resource Strain: Low Risk  (11/14/2023)    Received from [facility] Subsidiaries and Affiliates    Overall Financial Resource Strain (CARDIA)     Difficulty of Paying Living Expenses: Not hard at all   Food Insecurity: No Food Insecurity (11/14/2023)    Received  from [facility] Subsidiaries and Affiliates    Hunger Vital Sign     Worried About Running Out of Food in the Last Year: Never true     Ran Out of Food in the Last Year: Never true   Menarche age 11, , +OCPs, stopped age 18 due to DVT, age at first live birth 18, menopause age 49, no HRT. FH mother with cervical cancer, brother with prostate cancer, child with brain cancer,  at age 6, likely medulloblastoma.     Review of patient's allergies indicates:   Allergen Reactions    Amitriptyline Other (See Comments)     Other Reaction(s): muscle tightening    MUSCLE WEAKNESS    Adhesive Rash    Anastrozole Nausea And Vomiting and Other (See Comments)     Dizziness    Enoxaparin Rash    Methocarbamol Rash and Other (See Comments)     MUSCLE WEAKNESS    Rivaroxaban Rash    Warfarin Rash      Current Outpatient Medications on File Prior to Visit   Medication Sig Dispense Refill    amLODIPine (NORVASC) 10 MG tablet Take 10 mg by mouth.      buPROPion (WELLBUTRIN XL) 300 MG 24 hr tablet TAKE 1 TABLET BY MOUTH EVERY MORNING FOR DEPRESSION      calcium carbonate (CALCIUM 500 ORAL) Take by mouth.      clopidogreL (PLAVIX) 75 mg tablet       ergocalciferol, vitamin D2, (VITAMIN D ORAL) Take by mouth.      furosemide (LASIX) 40 MG tablet       HYDROcodone-acetaminophen (NORCO) 5-325 mg per tablet Take 1 tablet by mouth every 6 (six) hours as needed for Pain. 12 tablet 0    irbesartan (AVAPRO) 300 MG tablet Take 300 mg by mouth.      letrozole (FEMARA) 2.5 mg Tab Take 1 tablet (2.5 mg total) by mouth once daily. 30 tablet 3    rosuvastatin (CRESTOR) 10 MG tablet Take 10 mg by mouth.      tiZANidine (ZANAFLEX) 2 MG tablet Take 1 tablet (2 mg total) by mouth every 8 (eight) hours as needed (Muscle cramping). 12 tablet 0     No current facility-administered medications on file prior to visit.      Review of Systems   Constitutional:  Negative for unexpected weight change.   HENT:   "Negative for mouth sores.    Eyes:  Negative for visual disturbance.   Respiratory:  Negative for cough.    Gastrointestinal:  Negative for abdominal pain and diarrhea.   Genitourinary:  Negative for frequency.   Musculoskeletal:  Negative for back pain.        Right lateral rib pain   Integumentary:  Negative for rash.   Neurological:  Negative for headaches.   Hematological:  Negative for adenopathy.   Psychiatric/Behavioral:  The patient is nervous/anxious.          Vitals:    11/04/24 1001   BP: 124/81   Pulse: 84   Resp: 14   Temp: 98.4 °F (36.9 °C)   TempSrc: Oral   SpO2: 97%   Weight: 104.1 kg (229 lb 9.6 oz)   Height: 5' 7" (1.702 m)     Physical Exam  Constitutional:       Appearance: Normal appearance. She is overweight.   HENT:      Head: Normocephalic.      Nose: Nose normal.      Mouth/Throat:      Mouth: Mucous membranes are moist.   Eyes:      Extraocular Movements: Extraocular movements intact.      Conjunctiva/sclera: Conjunctivae normal.   Cardiovascular:      Rate and Rhythm: Normal rate and regular rhythm.   Pulmonary:      Effort: Pulmonary effort is normal.      Breath sounds: Normal breath sounds.   Chest:      Comments: Left breast incision intact, healed well.   Abdominal:      General: Bowel sounds are normal. There is no distension.      Palpations: Abdomen is soft.      Tenderness: There is no abdominal tenderness.   Musculoskeletal:         General: Normal range of motion.   Skin:     General: Skin is warm.   Neurological:      General: No focal deficit present.      Mental Status: She is alert and oriented to person, place, and time.   Psychiatric:         Mood and Affect: Mood normal.         Behavior: Behavior is cooperative.         Judgment: Judgment normal.       Lab Visit on 11/04/2024   Component Date Value    WBC 11/04/2024 8.01     RBC 11/04/2024 4.35     Hgb 11/04/2024 12.3     Hct 11/04/2024 38.9     MCV 11/04/2024 89.4     MCH 11/04/2024 28.3     MCHC 11/04/2024 31.6 (L)     " RDW 11/04/2024 12.2     Platelet 11/04/2024 313     MPV 11/04/2024 8.8     Neut % 11/04/2024 79.1     Lymph % 11/04/2024 14.1     Mono % 11/04/2024 5.0     Eos % 11/04/2024 1.5     Basophil % 11/04/2024 0.2     Lymph # 11/04/2024 1.13     Neut # 11/04/2024 6.33     Mono # 11/04/2024 0.40     Eos # 11/04/2024 0.12     Baso # 11/04/2024 0.02     IG# 11/04/2024 0.01     IG% 11/04/2024 0.1    Admission on 11/02/2024, Discharged on 11/02/2024   Component Date Value    Color, UA 11/02/2024 Straw     Appearance, UA 11/02/2024 Clear     Specific Gravity, UA 11/02/2024 >=1.030     pH, UA 11/02/2024 6.0     Protein, UA 11/02/2024 Negative     Glucose, UA 11/02/2024 Negative     Ketones, UA 11/02/2024 Trace (A)     Blood, UA 11/02/2024 Negative     Bilirubin, UA 11/02/2024 Negative     Urobilinogen, UA 11/02/2024 0.2     Nitrites, UA 11/02/2024 Negative     Leukocyte Esterase, UA 11/02/2024 Negative     Bacteria, UA 11/02/2024 None Seen     RBC, UA 11/02/2024 None Seen     WBC, UA 11/02/2024 3-5     Squamous Epithelial Cell* 11/02/2024 Moderate (A)           Assessment:       1. Ductal carcinoma in situ (DCIS) of left breast    2. Osteopenia of both hips         Plan:       Patient with Left breast DCIS, s/p lumpectomy done 7/22/24. DCIS measured 10mm, Grade 2-3, ER and MN strongly positive.   Recommend treatment with adjuvant radiation and endocrine therapy X 5 years. Explained rationale for treatment.     She completed adjuvant radiation on 10/1/24.   Arimidex daily started on 10/6/24. Stopped on 10/25/24 due to side effects.   CBC today is normal. CMP pending.   She plans on starting Femara tonight.   DEXA from 4/2024 with osteopenia. Once stabilized on AI, will need to be on Fosamax or Prolia. Patient prefers Prolia. She is regularly followed by a dentist. Plan to start at her next appointment since she is not yet stabilized on her AI.   Referred for genetic testing due to FH brother prostate cancer, child with brain  tumor. Results negative.   Will begin surveillance visits every 3 months.   Bilateral MMG planned for April 2025.   Recommended she reach out to her PCP for follow-up with new costochondritis.     Of note, patient is not a candidate for Tamoxifen. Has h/o recurrent DVT LLE, first in pregnancy and 2nd after neck surgery. Sees Dr. Omer and had stents placed, assume she had May Thurner syndrome. She got a rash with Lovenox, Coumadin and Xarelto. I suppose if she gets another blood clot, could try Eliquis. Remains on Plavix for the stents.    All questions answered at this time.      Dorene Sanchez, PC

## 2024-10-30 ENCOUNTER — OFFICE VISIT (OUTPATIENT)
Dept: SURGERY | Facility: CLINIC | Age: 64
End: 2024-10-30
Payer: MEDICARE

## 2024-10-30 VITALS
HEIGHT: 67 IN | HEART RATE: 77 BPM | WEIGHT: 229 LBS | BODY MASS INDEX: 35.94 KG/M2 | SYSTOLIC BLOOD PRESSURE: 134 MMHG | TEMPERATURE: 98 F | OXYGEN SATURATION: 97 % | DIASTOLIC BLOOD PRESSURE: 85 MMHG | RESPIRATION RATE: 18 BRPM

## 2024-10-30 DIAGNOSIS — Z98.890 S/P LUMPECTOMY, LEFT BREAST: ICD-10-CM

## 2024-10-30 DIAGNOSIS — D05.12 DUCTAL CARCINOMA IN SITU (DCIS) OF LEFT BREAST: Primary | ICD-10-CM

## 2024-10-30 PROCEDURE — 99999 PR PBB SHADOW E&M-EST. PATIENT-LVL IV: CPT | Mod: PBBFAC,,, | Performed by: PHYSICIAN ASSISTANT

## 2024-10-30 PROCEDURE — 99214 OFFICE O/P EST MOD 30 MIN: CPT | Mod: S$PBB,,, | Performed by: PHYSICIAN ASSISTANT

## 2024-10-30 PROCEDURE — 99214 OFFICE O/P EST MOD 30 MIN: CPT | Mod: PBBFAC | Performed by: PHYSICIAN ASSISTANT

## 2024-11-02 ENCOUNTER — HOSPITAL ENCOUNTER (EMERGENCY)
Facility: HOSPITAL | Age: 64
Discharge: HOME | End: 2024-11-02
Payer: MEDICARE

## 2024-11-02 VITALS
TEMPERATURE: 98 F | WEIGHT: 200 LBS | RESPIRATION RATE: 20 BRPM | HEART RATE: 79 BPM | DIASTOLIC BLOOD PRESSURE: 77 MMHG | BODY MASS INDEX: 31.39 KG/M2 | HEIGHT: 67 IN | SYSTOLIC BLOOD PRESSURE: 157 MMHG | OXYGEN SATURATION: 97 %

## 2024-11-02 DIAGNOSIS — R07.89 CHEST WALL PAIN: ICD-10-CM

## 2024-11-02 LAB
BACTERIA #/AREA URNS AUTO: ABNORMAL /HPF
BILIRUB UR QL STRIP.AUTO: NEGATIVE
CLARITY UR: CLEAR
COLOR UR AUTO: ABNORMAL
GLUCOSE UR QL STRIP: NEGATIVE
HGB UR QL STRIP: NEGATIVE
KETONES UR QL STRIP: ABNORMAL
LEUKOCYTE ESTERASE UR QL STRIP: NEGATIVE
NITRITE UR QL STRIP: NEGATIVE
PH UR STRIP: 6 [PH]
PROT UR QL STRIP: NEGATIVE
RBC #/AREA URNS AUTO: ABNORMAL /HPF
SP GR UR STRIP.AUTO: >=1.03 (ref 1–1.03)
SQUAMOUS #/AREA URNS AUTO: ABNORMAL /HPF
UROBILINOGEN UR STRIP-ACNC: 0.2
WBC #/AREA URNS AUTO: ABNORMAL /HPF

## 2024-11-02 PROCEDURE — 25000003 PHARM REV CODE 250

## 2024-11-02 PROCEDURE — 81003 URINALYSIS AUTO W/O SCOPE: CPT

## 2024-11-02 PROCEDURE — 99284 EMERGENCY DEPT VISIT MOD MDM: CPT | Mod: 25

## 2024-11-02 RX ORDER — KETOROLAC TROMETHAMINE 10 MG/1
10 TABLET, FILM COATED ORAL
Status: COMPLETED | OUTPATIENT
Start: 2024-11-02 | End: 2024-11-02

## 2024-11-02 RX ORDER — HYDROCODONE BITARTRATE AND ACETAMINOPHEN 5; 325 MG/1; MG/1
1 TABLET ORAL EVERY 6 HOURS PRN
Qty: 12 TABLET | Refills: 0 | Status: SHIPPED | OUTPATIENT
Start: 2024-11-02 | End: 2024-11-07

## 2024-11-02 RX ORDER — TIZANIDINE 2 MG/1
2 TABLET ORAL EVERY 8 HOURS PRN
Qty: 12 TABLET | Refills: 0 | Status: SHIPPED | OUTPATIENT
Start: 2024-11-02 | End: 2024-11-07

## 2024-11-02 RX ORDER — HYDROCODONE BITARTRATE AND ACETAMINOPHEN 5; 325 MG/1; MG/1
1 TABLET ORAL
Status: COMPLETED | OUTPATIENT
Start: 2024-11-02 | End: 2024-11-02

## 2024-11-02 RX ORDER — CYCLOBENZAPRINE HCL 10 MG
10 TABLET ORAL
Status: COMPLETED | OUTPATIENT
Start: 2024-11-02 | End: 2024-11-02

## 2024-11-02 RX ADMIN — CYCLOBENZAPRINE 10 MG: 10 TABLET, FILM COATED ORAL at 09:11

## 2024-11-02 RX ADMIN — HYDROCODONE BITARTRATE AND ACETAMINOPHEN 1 TABLET: 5; 325 TABLET ORAL at 09:11

## 2024-11-02 RX ADMIN — KETOROLAC TROMETHAMINE 10 MG: 10 TABLET, FILM COATED ORAL at 09:11

## 2024-11-03 NOTE — ED PROVIDER NOTES
Encounter Date: 11/2/2024       History     Chief Complaint   Patient presents with    Flank Pain     Right flank pain for 1 week, denies injury. Denies urinary complaints.      64-year-old female with past medical history left-sided breast cancer status post resection and receiving radiation therapy he will presenting to the ER for right sided rib pain flank pain started yesterday.  Patient reports that approximately 1 year ago she had a fall right side and had pain secondary to that fall but the pain subsided a year ago and she has not had any recurrence since then.  She denies any recent falls or injuries or significant strains that area.  She reports no dysuria or increased urinary frequency denies shortness of breath or chest pain, abdominal pain, nausea, vomiting, diarrhea or chills.    The history is provided by the patient.     Review of patient's allergies indicates:   Allergen Reactions    Amitriptyline Other (See Comments)     Other Reaction(s): muscle tightening    MUSCLE WEAKNESS    Adhesive Rash    Anastrozole Nausea And Vomiting and Other (See Comments)     Dizziness    Enoxaparin Rash    Methocarbamol Rash and Other (See Comments)     MUSCLE WEAKNESS    Rivaroxaban Rash    Warfarin Rash     Past Medical History:   Diagnosis Date    Breast cancer     Depression     History of blood clots     Hypertension     Lipoma of anterior chest wall      Past Surgical History:   Procedure Laterality Date    APPENDECTOMY      CHOLECYSTECTOMY      RENETTA FILTER PLACEMENT      2015    HYSTERECTOMY      KNEE SURGERY Left     MASTECTOMY, PARTIAL Left 7/22/2024    Procedure: MASTECTOMY, PARTIAL - left  w/ radiological marker;  Surgeon: Deirdre Welch MD;  Location: [facility];  Service: General;  Laterality: Left;    NECK SURGERY      PLACEMENT, INFERIOR VENA CAVA FILTER Left     SHOULDER ARTHROSCOPY W/ ROTATOR CUFF REPAIR Left     STENT PLACEMENT/PRIOR TO CALIN Left 2015     Family History   Problem Relation Name  Age of Onset    Cervical cancer Mother      Diabetes Mother      Hypertension Mother      Prostate cancer Father  79    Prostate cancer Brother  68    Hypertension Brother      Brain cancer Child  6    Cancer Maternal Uncle      Cancer Maternal Uncle      Cancer Maternal Uncle       Social History     Tobacco Use    Smoking status: Never    Smokeless tobacco: Never   Substance Use Topics    Alcohol use: Never    Drug use: Never     Review of Systems   Constitutional:  Negative for fever.   HENT:  Negative for sore throat.    Respiratory:  Negative for shortness of breath.    Cardiovascular:  Positive for chest pain (wall).   Gastrointestinal:  Negative for nausea.   Genitourinary:  Negative for dysuria.   Musculoskeletal:  Negative for back pain.   Skin:  Negative for rash.   Neurological:  Negative for weakness.   Hematological:  Does not bruise/bleed easily.       Physical Exam     Initial Vitals [11/02/24 1903]   BP Pulse Resp Temp SpO2   (!) 157/77 79 18 98.3 °F (36.8 °C) 97 %      MAP       --         Physical Exam    Vitals reviewed.  Constitutional: She appears well-developed and well-nourished. No distress.   HENT:   Head: Normocephalic and atraumatic.   Eyes: EOM are normal. Pupils are equal, round, and reactive to light. Right eye exhibits no discharge. Left eye exhibits no discharge.   Cardiovascular:  Normal rate and regular rhythm.           No murmur heard.  Pulmonary/Chest: No respiratory distress. She has no wheezes. She has no rhonchi. She has no rales. She exhibits tenderness.     Abdominal: Abdomen is soft. She exhibits no distension. There is no abdominal tenderness.   Musculoskeletal:        Back:      Neurological: She is alert and oriented to person, place, and time.   Skin: Skin is warm and dry. Capillary refill takes less than 2 seconds.         ED Course   Procedures  Labs Reviewed   URINALYSIS, REFLEX TO URINE CULTURE - Abnormal       Result Value    Color, UA Straw      Appearance, UA  Clear      Specific Gravity, UA >=1.030      pH, UA 6.0      Protein, UA Negative      Glucose, UA Negative      Ketones, UA Trace (*)     Blood, UA Negative      Bilirubin, UA Negative      Urobilinogen, UA 0.2      Nitrites, UA Negative      Leukocyte Esterase, UA Negative     URINALYSIS, MICROSCOPIC - Abnormal    Bacteria, UA None Seen      RBC, UA None Seen      WBC, UA 3-5      Squamous Epithelial Cells, UA Moderate (*)           Imaging Results              X-Ray Chest PA And Lateral (Final result)  Result time 11/02/24 21:44:59      Final result by Rommel Holloway MD (11/02/24 21:44:59)                   Impression:      No acute cardiopulmonary process identified.      Electronically signed by: Rommel Holloway  Date:    11/02/2024  Time:    21:44               Narrative:    EXAMINATION:  XR CHEST PA AND LATERAL    CLINICAL HISTORY:  Other chest pain    TECHNIQUE:  Two views    COMPARISON:  August 5, 2020.    FINDINGS:  Cardiopericardial silhouette is within normal limits. Lungs are without dense focal or segmental consolidation, congestion, pleural effusion or pneumothorax.  Previous ACDF.                                       Medications   HYDROcodone-acetaminophen 5-325 mg per tablet 1 tablet (1 tablet Oral Given 11/2/24 2107)   ketorolac tablet 10 mg (10 mg Oral Given 11/2/24 2107)   cyclobenzaprine tablet 10 mg (10 mg Oral Given 11/2/24 2107)     Medical Decision Making  64 y.o. female with chest pain with history and exam consistent with likely chest wall pain.    Initial consideration in this patient included chest wall pain including costochondritis, Tietze's synbdrome, and precordial catch syndrome, angina pectoris, acute coronary syndromes (ACS), pulmonary embolism (PE), aortic dissection, spontaneous pneumothorax, pneumonia, and esophageal pathology amongst others.     Patient presented with chest pain described as localized/reproducible/sharp.  A 12-lead EKG was obtained with no evidence of  dysrhythmia, ischemia, infarction, or other significant acute abnormalities.  Doubt cardiac etiology at this time given unremarkable EKG in patient without significant risk factors for acute coronary episode.  Plain films of the chest were obtained with no evidence suggestive of dissection, pneumonia, pneumothorax, or other significant pathology.  Patient reported significant improvement with Norco, ketorolac, muscle relaxer prior to discharge from ED.    We discussed return precautions, specifically for evidence of persistent or worsening chest pain, symptomatic treatment with analgesics/NSAIDs, and follow up with primary care doctor within 2-3 days for further evaluation, and the patient demonstrated understanding and agreement with this plan.      Amount and/or Complexity of Data Reviewed  Radiology: ordered.    Risk  Prescription drug management.                                      Clinical Impression:  Final diagnoses:  [R07.89] Chest wall pain          ED Disposition Condition    Discharge Stable          ED Prescriptions       Medication Sig Dispense Start Date End Date Auth. Provider    HYDROcodone-acetaminophen (NORCO) 5-325 mg per tablet Take 1 tablet by mouth every 6 (six) hours as needed for Pain. 12 tablet 11/2/2024 11/7/2024 Obey Muñoz MD    tiZANidine (ZANAFLEX) 2 MG tablet Take 1 tablet (2 mg total) by mouth every 8 (eight) hours as needed (Muscle cramping). 12 tablet 11/2/2024 11/7/2024 Obey Muñoz MD          Follow-up Information       Follow up With Specialties Details Why Contact Info    Antoinette Elizondo, ANEUDY Internal Medicine In 2 days  [address] [facility] [address]  [phone]               Obey Muñoz MD  11/03/24 2177

## 2024-11-03 NOTE — DISCHARGE INSTRUCTIONS
You were evaluated in the Emergency Department today for chest pain. Your evaluation has shown no signs of medical conditions requiring emergent intervention at this time, however we recommend that you follow up with your primary care physician or your cardiologist as soon as possible for further testing as an outpatient.  Please schedule an appointment for follow up with your primary care physician as soon as possible.    Return to the Emergency Department if you experience worsening or uncontrolled chest pain, shortness of breath, light headedness, feeling faint, nausea, vomiting, or any other concerning symptoms.    Thank you for choosing us for your care.

## 2024-11-04 ENCOUNTER — LAB VISIT (OUTPATIENT)
Dept: LAB | Facility: HOSPITAL | Age: 64
End: 2024-11-04
Attending: INTERNAL MEDICINE
Payer: MEDICARE

## 2024-11-04 ENCOUNTER — OFFICE VISIT (OUTPATIENT)
Dept: HEMATOLOGY/ONCOLOGY | Facility: CLINIC | Age: 64
End: 2024-11-04
Payer: MEDICARE

## 2024-11-04 VITALS
WEIGHT: 229.63 LBS | DIASTOLIC BLOOD PRESSURE: 81 MMHG | SYSTOLIC BLOOD PRESSURE: 124 MMHG | OXYGEN SATURATION: 97 % | TEMPERATURE: 98 F | BODY MASS INDEX: 36.04 KG/M2 | HEIGHT: 67 IN | HEART RATE: 84 BPM | RESPIRATION RATE: 14 BRPM

## 2024-11-04 DIAGNOSIS — M85.851 OSTEOPENIA OF BOTH HIPS: ICD-10-CM

## 2024-11-04 DIAGNOSIS — D05.12 DUCTAL CARCINOMA IN SITU (DCIS) OF LEFT BREAST: Primary | ICD-10-CM

## 2024-11-04 DIAGNOSIS — D05.12 DUCTAL CARCINOMA IN SITU (DCIS) OF LEFT BREAST: ICD-10-CM

## 2024-11-04 DIAGNOSIS — M85.852 OSTEOPENIA OF BOTH HIPS: ICD-10-CM

## 2024-11-04 LAB
ALBUMIN SERPL-MCNC: 3.7 G/DL (ref 3.4–4.8)
ALBUMIN/GLOB SERPL: 1.1 RATIO (ref 1.1–2)
ALP SERPL-CCNC: 115 UNIT/L (ref 40–150)
ALT SERPL-CCNC: 13 UNIT/L (ref 0–55)
ANION GAP SERPL CALC-SCNC: 11 MEQ/L
AST SERPL-CCNC: 12 UNIT/L (ref 5–34)
BASOPHILS # BLD AUTO: 0.02 X10(3)/MCL
BASOPHILS NFR BLD AUTO: 0.2 %
BILIRUB SERPL-MCNC: 0.6 MG/DL
BUN SERPL-MCNC: 23.9 MG/DL (ref 9.8–20.1)
CALCIUM SERPL-MCNC: 9.7 MG/DL (ref 8.4–10.2)
CHLORIDE SERPL-SCNC: 107 MMOL/L (ref 98–107)
CO2 SERPL-SCNC: 26 MMOL/L (ref 23–31)
CREAT SERPL-MCNC: 1.28 MG/DL (ref 0.55–1.02)
CREAT/UREA NIT SERPL: 19
EOSINOPHIL # BLD AUTO: 0.12 X10(3)/MCL (ref 0–0.9)
EOSINOPHIL NFR BLD AUTO: 1.5 %
ERYTHROCYTE [DISTWIDTH] IN BLOOD BY AUTOMATED COUNT: 12.2 % (ref 11.5–17)
GFR SERPLBLD CREATININE-BSD FMLA CKD-EPI: 47 ML/MIN/1.73/M2
GLOBULIN SER-MCNC: 3.5 GM/DL (ref 2.4–3.5)
GLUCOSE SERPL-MCNC: 153 MG/DL (ref 82–115)
HCT VFR BLD AUTO: 38.9 % (ref 37–47)
HGB BLD-MCNC: 12.3 G/DL (ref 12–16)
IMM GRANULOCYTES # BLD AUTO: 0.01 X10(3)/MCL (ref 0–0.04)
IMM GRANULOCYTES NFR BLD AUTO: 0.1 %
LYMPHOCYTES # BLD AUTO: 1.13 X10(3)/MCL (ref 0.6–4.6)
LYMPHOCYTES NFR BLD AUTO: 14.1 %
MCH RBC QN AUTO: 28.3 PG (ref 27–31)
MCHC RBC AUTO-ENTMCNC: 31.6 G/DL (ref 33–36)
MCV RBC AUTO: 89.4 FL (ref 80–94)
MONOCYTES # BLD AUTO: 0.4 X10(3)/MCL (ref 0.1–1.3)
MONOCYTES NFR BLD AUTO: 5 %
NEUTROPHILS # BLD AUTO: 6.33 X10(3)/MCL (ref 2.1–9.2)
NEUTROPHILS NFR BLD AUTO: 79.1 %
PLATELET # BLD AUTO: 313 X10(3)/MCL (ref 130–400)
PMV BLD AUTO: 8.8 FL (ref 7.4–10.4)
POTASSIUM SERPL-SCNC: 3.3 MMOL/L (ref 3.5–5.1)
PROT SERPL-MCNC: 7.2 GM/DL (ref 5.8–7.6)
RBC # BLD AUTO: 4.35 X10(6)/MCL (ref 4.2–5.4)
SODIUM SERPL-SCNC: 144 MMOL/L (ref 136–145)
WBC # BLD AUTO: 8.01 X10(3)/MCL (ref 4.5–11.5)

## 2024-11-04 PROCEDURE — 99214 OFFICE O/P EST MOD 30 MIN: CPT | Mod: S$PBB,,, | Performed by: NURSE PRACTITIONER

## 2024-11-04 PROCEDURE — 36415 COLL VENOUS BLD VENIPUNCTURE: CPT

## 2024-11-04 PROCEDURE — 99999 PR PBB SHADOW E&M-EST. PATIENT-LVL IV: CPT | Mod: PBBFAC,,, | Performed by: NURSE PRACTITIONER

## 2024-11-04 PROCEDURE — 85025 COMPLETE CBC W/AUTO DIFF WBC: CPT

## 2024-11-04 PROCEDURE — 80053 COMPREHEN METABOLIC PANEL: CPT

## 2024-11-04 PROCEDURE — 99214 OFFICE O/P EST MOD 30 MIN: CPT | Mod: PBBFAC | Performed by: NURSE PRACTITIONER

## 2024-11-18 ENCOUNTER — TELEPHONE (OUTPATIENT)
Dept: HEMATOLOGY/ONCOLOGY | Facility: CLINIC | Age: 64
End: 2024-11-18
Payer: MEDICARE

## 2024-11-18 DIAGNOSIS — D05.12 DUCTAL CARCINOMA IN SITU (DCIS) OF LEFT BREAST: Primary | ICD-10-CM

## 2024-11-18 RX ORDER — EXEMESTANE 25 MG/1
25 TABLET ORAL DAILY
Qty: 30 TABLET | Refills: 3 | Status: SHIPPED | OUTPATIENT
Start: 2024-11-18 | End: 2024-11-19 | Stop reason: SDUPTHER

## 2024-11-18 NOTE — TELEPHONE ENCOUNTER
Patient states she has had hallucinations of letrozole as well. Same as arimidex. She is asking if she has to take hormone blockers. If so, is there anything else that she can try? Please advise.

## 2024-11-18 NOTE — TELEPHONE ENCOUNTER
Yes we strongly encourage her to take the hormone blockers. We have not had to discontinue in the past due to hallucinations. This is not a typical side effect of these medications. Did she start another medication around that time? Is she taking any supplements. I suggest she switch her AI time to at night before bed. Another option would be Aromasin.

## 2024-11-18 NOTE — TELEPHONE ENCOUNTER
States she has tried taking the medication both in the morning and evening. Has not started or stopped any new medications. She is willing to try the aromasin. I sent rx into pharmacy and asked that she contact us with any issues. She voiced understanding.

## 2024-11-19 ENCOUNTER — TELEPHONE (OUTPATIENT)
Dept: HEMATOLOGY/ONCOLOGY | Facility: CLINIC | Age: 64
End: 2024-11-19
Payer: MEDICARE

## 2024-11-19 DIAGNOSIS — D05.12 DUCTAL CARCINOMA IN SITU (DCIS) OF LEFT BREAST: ICD-10-CM

## 2024-11-19 RX ORDER — EXEMESTANE 25 MG/1
25 TABLET ORAL DAILY
Qty: 30 TABLET | Refills: 3 | Status: SHIPPED | OUTPATIENT
Start: 2024-11-19 | End: 2025-03-19

## 2024-11-19 NOTE — TELEPHONE ENCOUNTER
Any assistance? If the Aromasin is too expensive then the only other options are the Femara or the Arimidex. She would need to decide which one she tolerated better.

## 2024-11-19 NOTE — TELEPHONE ENCOUNTER
Patient states she did not  the aromasin yesterday. Her out of pocket after medicare is $230.00. States she cannot afford this. Please advise.

## 2024-11-26 ENCOUNTER — HOSPITAL ENCOUNTER (EMERGENCY)
Facility: HOSPITAL | Age: 64
Discharge: HOME | End: 2024-11-26
Attending: SPECIALIST
Payer: MEDICARE

## 2024-11-26 VITALS
DIASTOLIC BLOOD PRESSURE: 88 MMHG | HEART RATE: 76 BPM | RESPIRATION RATE: 17 BRPM | SYSTOLIC BLOOD PRESSURE: 159 MMHG | HEIGHT: 67 IN | OXYGEN SATURATION: 98 % | WEIGHT: 200 LBS | BODY MASS INDEX: 31.39 KG/M2 | TEMPERATURE: 98 F

## 2024-11-26 DIAGNOSIS — L03.113 CELLULITIS OF RIGHT UPPER EXTREMITY: Primary | ICD-10-CM

## 2024-11-26 PROCEDURE — 25000003 PHARM REV CODE 250: Performed by: NURSE PRACTITIONER

## 2024-11-26 PROCEDURE — 99284 EMERGENCY DEPT VISIT MOD MDM: CPT

## 2024-11-26 RX ORDER — MUPIROCIN 20 MG/G
OINTMENT TOPICAL 3 TIMES DAILY
Qty: 22 G | Refills: 0 | Status: SHIPPED | OUTPATIENT
Start: 2024-11-26

## 2024-11-26 RX ORDER — CEPHALEXIN 500 MG/1
500 CAPSULE ORAL
Status: COMPLETED | OUTPATIENT
Start: 2024-11-26 | End: 2024-11-26

## 2024-11-26 RX ORDER — CEPHALEXIN 500 MG/1
500 CAPSULE ORAL EVERY 8 HOURS
Qty: 21 CAPSULE | Refills: 0 | Status: SHIPPED | OUTPATIENT
Start: 2024-11-26 | End: 2024-12-03

## 2024-11-26 RX ADMIN — CEPHALEXIN 500 MG: 500 CAPSULE ORAL at 08:11

## 2024-11-27 ENCOUNTER — TELEPHONE (OUTPATIENT)
Dept: HEMATOLOGY/ONCOLOGY | Facility: CLINIC | Age: 64
End: 2024-11-27
Payer: MEDICARE

## 2024-11-27 NOTE — ED PROVIDER NOTES
Encounter Date: 11/26/2024       History     Chief Complaint   Patient presents with    Insect Bite     Pt reports today she noticed an area of redness/ swelling to her right wrist; small bite nate in center.      64 year old female presents to ER with area of right forearm with swelling and redness. No drainage. No fever or vomiting.     The history is provided by the patient. No  was used.     Review of patient's allergies indicates:   Allergen Reactions    Amitriptyline Other (See Comments)     Other Reaction(s): muscle tightening    MUSCLE WEAKNESS    Adhesive Rash    Anastrozole Nausea And Vomiting and Other (See Comments)     Dizziness    Enoxaparin Rash    Methocarbamol Rash and Other (See Comments)     MUSCLE WEAKNESS    Rivaroxaban Rash    Warfarin Rash     Past Medical History:   Diagnosis Date    Breast cancer     Depression     History of blood clots     Hypertension     Lipoma of anterior chest wall      Past Surgical History:   Procedure Laterality Date    APPENDECTOMY      CHOLECYSTECTOMY      RENETTA FILTER PLACEMENT      2015    HYSTERECTOMY      KNEE SURGERY Left     MASTECTOMY, PARTIAL Left 7/22/2024    Procedure: MASTECTOMY, PARTIAL - left  w/ radiological marker;  Surgeon: Deirdre Welch MD;  Location: [facility] OR;  Service: General;  Laterality: Left;    NECK SURGERY      PLACEMENT, INFERIOR VENA CAVA FILTER Left     SHOULDER ARTHROSCOPY W/ ROTATOR CUFF REPAIR Left     STENT PLACEMENT/PRIOR TO CALIN Left 2015     Family History   Problem Relation Name Age of Onset    Cervical cancer Mother      Diabetes Mother      Hypertension Mother      Prostate cancer Father  79    Prostate cancer Brother  68    Hypertension Brother      Brain cancer Child  6    Cancer Maternal Uncle      Cancer Maternal Uncle      Cancer Maternal Uncle       Social History     Tobacco Use    Smoking status: Never    Smokeless tobacco: Never   Substance Use Topics    Alcohol use: Never    Drug use:  Never     Review of Systems   Constitutional:  Negative for fever.   Gastrointestinal:  Negative for vomiting.   Skin:  Positive for color change and wound.   All other systems reviewed and are negative.      Physical Exam     Initial Vitals [11/26/24 2048]   BP Pulse Resp Temp SpO2   (!) 159/88 76 17 97.8 °F (36.6 °C) 98 %      MAP       --         Physical Exam    Nursing note and vitals reviewed.  Constitutional: She appears well-developed and well-nourished.   HENT:   Head: Normocephalic.   Eyes: EOM are normal.   Neck: Neck supple.   Cardiovascular:  Intact distal pulses.           Pulmonary/Chest: No respiratory distress.   Musculoskeletal:         General: Normal range of motion.      Cervical back: Neck supple.     Neurological: She is alert and oriented to person, place, and time.   Skin: Skin is warm and dry. Capillary refill takes less than 2 seconds. There is erythema.        Psychiatric: She has a normal mood and affect.         ED Course   Procedures  Labs Reviewed - No data to display       Imaging Results    None          Medications   cephALEXin capsule 500 mg (has no administration in time range)     Medical Decision Making  DDX: cellulitis, abscess    No fluctuance or drainage. Papule firm. Will treat with keflex as patient states she cannot take Bactrim. Afebrile and nontoxic.    Risk  Prescription drug management.                                      Clinical Impression:  Final diagnoses:  [L03.113] Cellulitis of right upper extremity (Primary)          ED Disposition Condition    Discharge Stable          ED Prescriptions       Medication Sig Dispense Start Date End Date Auth. Provider    cephALEXin (KEFLEX) 500 MG capsule Take 1 capsule (500 mg total) by mouth every 8 (eight) hours. for 7 days 21 capsule 11/26/2024 12/3/2024 Do Alvarez FNP    mupirocin (BACTROBAN) 2 % ointment Apply topically 3 (three) times daily. 22 g 11/26/2024 -- Do Alvarez FNP          Follow-up  Information    None          Do Alvarez, [facility]  11/26/24 0884

## 2024-11-27 NOTE — DISCHARGE INSTRUCTIONS
Warm compresses  Mupirocin ointment to area  Keep covered when out of home  Cephalexin as directed

## 2025-01-16 ENCOUNTER — HOSPITAL ENCOUNTER (OUTPATIENT)
Dept: RADIOLOGY | Facility: HOSPITAL | Age: 65
Discharge: HOME | End: 2025-01-16
Attending: PHYSICIAN ASSISTANT
Payer: MEDICARE

## 2025-01-16 DIAGNOSIS — N63.22 MASS OF UPPER INNER QUADRANT OF LEFT BREAST: ICD-10-CM

## 2025-01-16 DIAGNOSIS — D05.12 DUCTAL CARCINOMA IN SITU (DCIS) OF LEFT BREAST: ICD-10-CM

## 2025-01-16 DIAGNOSIS — R92.8 ABNORMAL MAMMOGRAM: Primary | ICD-10-CM

## 2025-01-16 LAB — GRAM STN SPEC: NORMAL

## 2025-01-16 PROCEDURE — 76942 ECHO GUIDE FOR BIOPSY: CPT | Mod: 26,LT,, | Performed by: RADIOLOGY

## 2025-01-16 PROCEDURE — 76642 ULTRASOUND BREAST LIMITED: CPT | Mod: 26,LT,, | Performed by: RADIOLOGY

## 2025-01-16 PROCEDURE — 77061 BREAST TOMOSYNTHESIS UNI: CPT | Mod: TC,LT

## 2025-01-16 PROCEDURE — 88305 TISSUE EXAM BY PATHOLOGIST: CPT | Performed by: PHYSICIAN ASSISTANT

## 2025-01-16 PROCEDURE — 76942 ECHO GUIDE FOR BIOPSY: CPT | Mod: TC

## 2025-01-16 PROCEDURE — 87205 SMEAR GRAM STAIN: CPT | Performed by: PHYSICIAN ASSISTANT

## 2025-01-16 PROCEDURE — 77065 DX MAMMO INCL CAD UNI: CPT | Mod: 26,LT,, | Performed by: RADIOLOGY

## 2025-01-16 PROCEDURE — 10160 PNXR ASPIR ABSC HMTMA BULLA: CPT | Mod: ,,, | Performed by: RADIOLOGY

## 2025-01-16 PROCEDURE — 76642 ULTRASOUND BREAST LIMITED: CPT | Mod: TC,LT

## 2025-01-16 PROCEDURE — 87070 CULTURE OTHR SPECIMN AEROBIC: CPT | Performed by: PHYSICIAN ASSISTANT

## 2025-01-16 PROCEDURE — 77061 BREAST TOMOSYNTHESIS UNI: CPT | Mod: 26,LT,, | Performed by: RADIOLOGY

## 2025-01-16 PROCEDURE — 88112 CYTOPATH CELL ENHANCE TECH: CPT

## 2025-01-17 LAB — PSYCHE PATHOLOGY RESULT: NORMAL

## 2025-01-21 LAB — BACTERIA FLD CULT: NORMAL

## 2025-01-28 ENCOUNTER — HOSPITAL ENCOUNTER (EMERGENCY)
Facility: HOSPITAL | Age: 65
Discharge: HOME | End: 2025-01-29
Attending: EMERGENCY MEDICINE
Payer: MEDICARE

## 2025-01-28 DIAGNOSIS — N64.4 BREAST PAIN: Primary | ICD-10-CM

## 2025-01-28 LAB
ALBUMIN SERPL-MCNC: 4 G/DL (ref 3.4–4.8)
ALBUMIN/GLOB SERPL: 1 RATIO (ref 1.1–2)
ALP SERPL-CCNC: 154 UNIT/L (ref 40–150)
ALT SERPL-CCNC: 34 UNIT/L (ref 0–55)
ANION GAP SERPL CALC-SCNC: 10 MEQ/L
AST SERPL-CCNC: 19 UNIT/L (ref 5–34)
BASOPHILS # BLD AUTO: 0.04 X10(3)/MCL
BASOPHILS NFR BLD AUTO: 0.5 %
BILIRUB SERPL-MCNC: 0.6 MG/DL
BUN SERPL-MCNC: 18.8 MG/DL (ref 9.8–20.1)
CALCIUM SERPL-MCNC: 9.8 MG/DL (ref 8.4–10.2)
CHLORIDE SERPL-SCNC: 109 MMOL/L (ref 98–107)
CO2 SERPL-SCNC: 24 MMOL/L (ref 23–31)
CREAT SERPL-MCNC: 0.78 MG/DL (ref 0.55–1.02)
CREAT/UREA NIT SERPL: 24
EOSINOPHIL # BLD AUTO: 0.18 X10(3)/MCL (ref 0–0.9)
EOSINOPHIL NFR BLD AUTO: 2.3 %
ERYTHROCYTE [DISTWIDTH] IN BLOOD BY AUTOMATED COUNT: 12.8 % (ref 11.5–17)
GFR SERPLBLD CREATININE-BSD FMLA CKD-EPI: >60 ML/MIN/1.73/M2
GLOBULIN SER-MCNC: 3.9 GM/DL (ref 2.4–3.5)
GLUCOSE SERPL-MCNC: 110 MG/DL (ref 82–115)
HCT VFR BLD AUTO: 40.8 % (ref 37–47)
HGB BLD-MCNC: 12.9 G/DL (ref 12–16)
IMM GRANULOCYTES # BLD AUTO: 0.02 X10(3)/MCL (ref 0–0.04)
IMM GRANULOCYTES NFR BLD AUTO: 0.3 %
LYMPHOCYTES # BLD AUTO: 2.05 X10(3)/MCL (ref 0.6–4.6)
LYMPHOCYTES NFR BLD AUTO: 25.8 %
MCH RBC QN AUTO: 27.6 PG (ref 27–31)
MCHC RBC AUTO-ENTMCNC: 31.6 G/DL (ref 33–36)
MCV RBC AUTO: 87.4 FL (ref 80–94)
MONOCYTES # BLD AUTO: 0.46 X10(3)/MCL (ref 0.1–1.3)
MONOCYTES NFR BLD AUTO: 5.8 %
NEUTROPHILS # BLD AUTO: 5.21 X10(3)/MCL (ref 2.1–9.2)
NEUTROPHILS NFR BLD AUTO: 65.3 %
NRBC BLD AUTO-RTO: 0 %
PLATELET # BLD AUTO: 377 X10(3)/MCL (ref 130–400)
PMV BLD AUTO: 9.5 FL (ref 7.4–10.4)
POTASSIUM SERPL-SCNC: 3.6 MMOL/L (ref 3.5–5.1)
PROT SERPL-MCNC: 7.9 GM/DL (ref 5.8–7.6)
RBC # BLD AUTO: 4.67 X10(6)/MCL (ref 4.2–5.4)
SODIUM SERPL-SCNC: 143 MMOL/L (ref 136–145)
WBC # BLD AUTO: 7.96 X10(3)/MCL (ref 4.5–11.5)

## 2025-01-28 PROCEDURE — 80053 COMPREHEN METABOLIC PANEL: CPT | Performed by: NURSE PRACTITIONER

## 2025-01-28 PROCEDURE — 99284 EMERGENCY DEPT VISIT MOD MDM: CPT

## 2025-01-28 PROCEDURE — 85025 COMPLETE CBC W/AUTO DIFF WBC: CPT | Performed by: NURSE PRACTITIONER

## 2025-01-28 RX ORDER — HYDROCODONE BITARTRATE AND ACETAMINOPHEN 7.5; 325 MG/1; MG/1
1 TABLET ORAL
Status: DISCONTINUED | OUTPATIENT
Start: 2025-01-28 | End: 2025-01-29 | Stop reason: HOSPADM

## 2025-01-29 VITALS
HEART RATE: 73 BPM | OXYGEN SATURATION: 96 % | TEMPERATURE: 98 F | BODY MASS INDEX: 31.39 KG/M2 | HEIGHT: 67 IN | WEIGHT: 200 LBS | DIASTOLIC BLOOD PRESSURE: 84 MMHG | SYSTOLIC BLOOD PRESSURE: 161 MMHG | RESPIRATION RATE: 19 BRPM

## 2025-01-29 PROCEDURE — 25000003 PHARM REV CODE 250: Performed by: EMERGENCY MEDICINE

## 2025-01-29 PROCEDURE — 96372 THER/PROPH/DIAG INJ SC/IM: CPT | Performed by: EMERGENCY MEDICINE

## 2025-01-29 PROCEDURE — 63600175 PHARM REV CODE 636 W HCPCS: Mod: JZ,TB | Performed by: EMERGENCY MEDICINE

## 2025-01-29 RX ORDER — KETOROLAC TROMETHAMINE 30 MG/ML
60 INJECTION, SOLUTION INTRAMUSCULAR; INTRAVENOUS
Status: COMPLETED | OUTPATIENT
Start: 2025-01-29 | End: 2025-01-29

## 2025-01-29 RX ORDER — ACETAMINOPHEN 500 MG
1000 TABLET ORAL
Status: COMPLETED | OUTPATIENT
Start: 2025-01-29 | End: 2025-01-29

## 2025-01-29 RX ORDER — AMLODIPINE BESYLATE 5 MG/1
10 TABLET ORAL
Status: COMPLETED | OUTPATIENT
Start: 2025-01-29 | End: 2025-01-29

## 2025-01-29 RX ORDER — LOSARTAN POTASSIUM 50 MG/1
50 TABLET ORAL DAILY
Status: DISCONTINUED | OUTPATIENT
Start: 2025-01-29 | End: 2025-01-29 | Stop reason: HOSPADM

## 2025-01-29 RX ORDER — OXYCODONE AND ACETAMINOPHEN 10; 325 MG/1; MG/1
1 TABLET ORAL EVERY 6 HOURS PRN
Qty: 12 TABLET | Refills: 0 | Status: SHIPPED | OUTPATIENT
Start: 2025-01-29

## 2025-01-29 RX ORDER — LIDOCAINE 50 MG/G
1 PATCH TOPICAL
Status: DISCONTINUED | OUTPATIENT
Start: 2025-01-29 | End: 2025-01-29 | Stop reason: HOSPADM

## 2025-01-29 RX ADMIN — AMLODIPINE BESYLATE 10 MG: 5 TABLET ORAL at 02:01

## 2025-01-29 RX ADMIN — ACETAMINOPHEN 1000 MG: 500 TABLET ORAL at 02:01

## 2025-01-29 RX ADMIN — KETOROLAC TROMETHAMINE 60 MG: 30 INJECTION, SOLUTION INTRAMUSCULAR at 02:01

## 2025-01-29 RX ADMIN — LIDOCAINE 1 PATCH: 50 PATCH TOPICAL at 02:01

## 2025-01-29 NOTE — FIRST PROVIDER EVALUATION
Medical screening examination initiated.  I have conducted a focused provider triage encounter, findings are as follows:    Brief history of present illness:  Patient states left breast pain. Hx. Of breast CA and states that she had breast biopsy recently. States that she is having worsening pain.     There were no vitals filed for this visit.    Pertinent physical exam:  Awake, alert, ambulatory      Brief workup plan:  Labs    Preliminary workup initiated; this workup will be continued and followed by the physician or advanced practice provider that is assigned to the patient when roomed.

## 2025-01-29 NOTE — ED PROVIDER NOTES
Encounter Date: 1/28/2025    SCRIBE #1 NOTE: I, Daysi Guerrier, am scribing for, and in the presence of,  Martin Roberts MD. I have scribed the following portions of the note - Other sections scribed: HPI, ROS, PE.       History     Chief Complaint   Patient presents with    Breast Pain     L side breast pain since 1530 Diagnosed with breast CA in July 2024. States recently had blood removed from L breast 2 weeks. Denies chemo or radiation in the last month. Denies fever. No pain medications taken pta. Oncologist Dr. Rivera.      Patient is a 65-year-old female with a history of HTN and breast cancer s/p partial left mastectomy presenting to the ED with c/o breast pain. The pt reports a painful mass to her left breast. She states that she has had this mass for several weeks, noting that she saw her oncologist for it approximately 2.5 weeks ago, on 1/16/25. She states that 60 cc's of blood were drained from the area at that time. She notes that the pain worsened today around 1400, prompting her to present to the ED for evaluation. She denies any trauma to the area, chest pain, shortness of breath, or discharge from the nipple.     The history is provided by the patient. No  was used.     Review of patient's allergies indicates:   Allergen Reactions    Amitriptyline Other (See Comments)     Other Reaction(s): muscle tightening    MUSCLE WEAKNESS    Adhesive Rash    Anastrozole Nausea And Vomiting and Other (See Comments)     Dizziness    Enoxaparin Rash    Methocarbamol Rash and Other (See Comments)     MUSCLE WEAKNESS    Rivaroxaban Rash    Warfarin Rash     Past Medical History:   Diagnosis Date    Breast cancer     Depression     History of blood clots     Hypertension     Lipoma of anterior chest wall      Past Surgical History:   Procedure Laterality Date    APPENDECTOMY      CHOLECYSTECTOMY      RENETTA FILTER PLACEMENT      2015    HYSTERECTOMY      KNEE SURGERY Left     MASTECTOMY,  PARTIAL Left 7/22/2024    Procedure: MASTECTOMY, PARTIAL - left  w/ radiological marker;  Surgeon: Deirdre Welch MD;  Location: [facility] OR;  Service: General;  Laterality: Left;    NECK SURGERY      PLACEMENT, INFERIOR VENA CAVA FILTER Left     SHOULDER ARTHROSCOPY W/ ROTATOR CUFF REPAIR Left     STENT PLACEMENT/PRIOR TO CALIN Left 2015     Family History   Problem Relation Name Age of Onset    Cervical cancer Mother      Diabetes Mother      Hypertension Mother      Prostate cancer Father  79    Prostate cancer Brother  68    Hypertension Brother      Brain cancer Child  6    Cancer Maternal Uncle      Cancer Maternal Uncle      Cancer Maternal Uncle       Social History     Tobacco Use    Smoking status: Never    Smokeless tobacco: Never   Substance Use Topics    Alcohol use: Never    Drug use: Never     Review of Systems   Respiratory:  Negative for shortness of breath.    Cardiovascular:  Negative for chest pain.   Musculoskeletal:         Painful mass to the breast.       Physical Exam     Initial Vitals [01/28/25 1920]   BP Pulse Resp Temp SpO2   (!) 190/91 99 18 98 °F (36.7 °C) 97 %      MAP       --         Physical Exam    HENT:   Head: Normocephalic.   Eyes: EOM are normal. Right eye exhibits no discharge. Left eye exhibits no discharge. No scleral icterus.   Neck: Neck supple.   Cardiovascular:  Normal rate, regular rhythm and normal heart sounds.           Pulmonary/Chest: Breath sounds normal. No stridor. No respiratory distress. She has no wheezes. She has no rhonchi. She has no rales.   Tender, nodular area superior to the left breast. There is no redness or increased warmth.   Abdominal: She exhibits no distension. There is no abdominal tenderness. There is no rebound and no guarding.   Musculoskeletal:         General: No tenderness or edema. Normal range of motion.      Cervical back: Neck supple.     Neurological: She is alert and oriented to person, place, and time. She has normal strength.    Skin: Skin is dry. No rash noted. No erythema. No pallor.   Psychiatric: She has a normal mood and affect. Her behavior is normal. Judgment and thought content normal.         ED Course   Procedures  Labs Reviewed   COMPREHENSIVE METABOLIC PANEL - Abnormal       Result Value    Sodium 143      Potassium 3.6      Chloride 109 (*)     CO2 24      Glucose 110      Blood Urea Nitrogen 18.8      Creatinine 0.78      Calcium 9.8      Protein Total 7.9 (*)     Albumin 4.0      Globulin 3.9 (*)     Albumin/Globulin Ratio 1.0 (*)     Bilirubin Total 0.6       (*)     ALT 34      AST 19      eGFR >60      Anion Gap 10.0      BUN/Creatinine Ratio 24     CBC WITH DIFFERENTIAL - Abnormal    WBC 7.96      RBC 4.67      Hgb 12.9      Hct 40.8      MCV 87.4      MCH 27.6      MCHC 31.6 (*)     RDW 12.8      Platelet 377      MPV 9.5      Neut % 65.3      Lymph % 25.8      Mono % 5.8      Eos % 2.3      Basophil % 0.5      Imm Grans % 0.3      Neut # 5.21      Lymph # 2.05      Mono # 0.46      Eos # 0.18      Baso # 0.04      Imm Gran # 0.02      NRBC% 0.0     CBC W/ AUTO DIFFERENTIAL    Narrative:     The following orders were created for panel order CBC Auto Differential.  Procedure                               Abnormality         Status                     ---------                               -----------         ------                     CBC with Differential[[id number]]       Abnormal            Final result                 Please view results for these tests on the individual orders.          Imaging Results    None          Medications   acetaminophen tablet 1,000 mg (1,000 mg Oral Given 1/29/25 0200)   ketorolac injection 60 mg (60 mg Intramuscular Given 1/29/25 0200)   amLODIPine tablet 10 mg (10 mg Oral Given 1/29/25 0200)     Medical Decision Making  Differential diagnosis include but are not limited to: breast mass, hematoma, and abscess.    Amount and/or Complexity of Data Reviewed  Labs: ordered.  Decision-making details documented in ED Course.    Risk  OTC drugs.  Prescription drug management.            Scribe Attestation:   Scribe #1: I performed the above scribed service and the documentation accurately describes the services I performed. I attest to the accuracy of the note.    Attending Attestation:           Physician Attestation for Scribe:  Physician Attestation Statement for Scribe #1: I, Martin Roberts MD, reviewed documentation, as scribed by Daysi Guerrier in my presence, and it is both accurate and complete.                                    Clinical Impression:  Final diagnoses:  [N64.4] Breast pain (Primary)          ED Disposition Condition    Discharge Stable          ED Prescriptions       Medication Sig Dispense Start Date End Date Auth. Provider    oxyCODONE-acetaminophen (PERCOCET)  mg per tablet Take 1 tablet by mouth every 6 (six) hours as needed for Pain. 12 tablet 1/29/2025 -- Martin Roberts MD          Follow-up Information       Follow up With Specialties Details Why Contact Info    Leah Rivera MD Oncology, Hematology and Oncology In 2 days  [address].  [address]  [facility] [place] [facility] [address]  [phone]               Martin Roberts MD  02/13/25 6344

## 2025-01-30 ENCOUNTER — OFFICE VISIT (OUTPATIENT)
Dept: SURGERY | Facility: CLINIC | Age: 65
End: 2025-01-30
Payer: MEDICARE

## 2025-01-30 VITALS
HEIGHT: 67 IN | HEART RATE: 84 BPM | TEMPERATURE: 98 F | SYSTOLIC BLOOD PRESSURE: 123 MMHG | OXYGEN SATURATION: 99 % | RESPIRATION RATE: 20 BRPM | DIASTOLIC BLOOD PRESSURE: 69 MMHG | BODY MASS INDEX: 37.04 KG/M2 | WEIGHT: 236 LBS

## 2025-01-30 DIAGNOSIS — D05.12 DUCTAL CARCINOMA IN SITU (DCIS) OF LEFT BREAST: Primary | ICD-10-CM

## 2025-01-30 DIAGNOSIS — N64.89 HEMATOMA OF LEFT BREAST: ICD-10-CM

## 2025-01-30 PROCEDURE — 10140 I&D HMTMA SEROMA/FLUID COLLJ: CPT | Mod: PBBFAC | Performed by: PHYSICIAN ASSISTANT

## 2025-01-30 PROCEDURE — 76942 ECHO GUIDE FOR BIOPSY: CPT | Mod: 26,S$PBB,59, | Performed by: PHYSICIAN ASSISTANT

## 2025-01-30 PROCEDURE — 99999 PR PBB SHADOW E&M-EST. PATIENT-LVL V: CPT | Mod: PBBFAC,,, | Performed by: PHYSICIAN ASSISTANT

## 2025-01-30 PROCEDURE — 99215 OFFICE O/P EST HI 40 MIN: CPT | Mod: PBBFAC | Performed by: PHYSICIAN ASSISTANT

## 2025-01-30 PROCEDURE — 76942 ECHO GUIDE FOR BIOPSY: CPT | Mod: PBBFAC | Performed by: PHYSICIAN ASSISTANT

## 2025-01-30 PROCEDURE — 99214 OFFICE O/P EST MOD 30 MIN: CPT | Mod: S$PBB,25,, | Performed by: PHYSICIAN ASSISTANT

## 2025-01-30 PROCEDURE — 10140 I&D HMTMA SEROMA/FLUID COLLJ: CPT | Mod: S$PBB,,, | Performed by: PHYSICIAN ASSISTANT

## 2025-01-30 RX ORDER — ACETAMINOPHEN AND CODEINE PHOSPHATE 300; 30 MG/1; MG/1
1 TABLET ORAL
COMMUNITY
Start: 2024-12-10

## 2025-01-30 NOTE — PROGRESS NOTES
[facility] Breast Surg  Breast Surgical Oncology  Follow-Up Patient Office Visit       Referring Provider: Antoinette Elizondo   PCP: Antoinette Elizondo NP   Medical Oncologist: Dr. Leah Rivera  Radiation Oncologist: Dr. Addie Jimenez  Other Care Providers:     Chief Complaint:   Chief Complaint   Patient presents with    Follow-up     Patient c/o left breast swelling, constant aching with intermittent sharp, stabbing, stinging pain, denies any redness        Subjective:   Treatment History:  7/22/2024 Left Lumpectomy  10/2024 [facility] Judicata CancerNext-Expanded analysis - negative  Adjuvant radiation completed on 10/1/24.   Adjuvant Arimidex started 10/2024, but recently stopped s/t hallucinations. Started Letrozole 11/2024 but discontinued shortly after s/t intolerance. Declined further endocrine therapy.    Interval History:  1/30/2025 - Barbara Alvarez presents today for evaluation of a left breast hematoma s/p aspiration on 1/16. She states that her pain symptoms immediately resolved after aspiration but on Monday of this week she developed pain and swelling again. Denies redness, warmth, fluctuance, fevers.     HPI:  Barbara Alvarez is a 64 y.o. female who presents on 7/9/2024 for evaluation of newly diagnosed left  breast cancer. Biopsy of left breast mass positive for DCIS     A detailed patient history was obtained and reviewed. She currently denies any breast issues including rashes, redness, pain, swelling, nipple discharge, or new lumps/masses.     Of note, pt developed a blood clot after her first pregnancy and was taken off birth control medications. She had an IVC filter placed and is currently taking Plavix. She see Antoinette Elizondo NP for management of her Plavix.     MG breast density: Category B (scattered areas of fibroglandular tissue)      Imaging:  Bilateral screening mammogram 4/29/2024:   IMPRESSION: INCOMPLETE: NEEDS ADDITIONAL IMAGING EVALUATION  The oval mass in the 12:00  left breast needs further evaluation.       Left diagnostic mammogram 6/10/2024:   IMPRESSION: SUSPICIOUS OF MALIGNANCY  Left breast 11:00 10 cm from the nipple posterior depth 0.7 cm irregular hypoechoic mass with angular/spiculated margins is suspicious for malignancy. BIRADS 4  Left breast US guided biopsy 7/1/2024:   IMPRESSION: ULTRASOUND GUIDED BIOPSY MALIGNANT   Ultrasound guided biopsy of the 0.7 cm mass in the left breast at 11:00 posterior depth 10 cm from the nipple with placement of a HydroMARK T3 coil-shaped clip was successful with no apparent post procedure complications.    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  Pathology results are concordant with mammography and ultrasound findings.       Left breast DG MG and US 1/16/2025 to evaluate painful area of palpable concern at the surgical site, increasing in size.   Probable evolving hematoma versus complex seroma in the superior left breast, accounting for the area of patient concern. (Located at at 11:00 10 cmfn, measures 6.5 cm on US)  No radiographic evidence of left breast malignancy.       Pathology:  6/20/2024  Left breast mass at 11:00 10 CMFN, US biopsy: DCIS, G2, predominately cribriform and papillary types, 0.4 cm in greatest dimension. ER 90-95%, NC 70-75%    7/22/2024  Left Lumpectomy  Ductal carcinoma in situ with prominent apocrine features, grade 2-3. Cribriform and papillary patterns; no comedo necrosis identified; calcium phosphate focal microcalcifications present; biopsy site changes associated with ductal carcinoma in situ.  All margins clear.     OB/GYN History:  Age at Menarche Onset: 11  Menopausal Status: postmenopausal, LMP: at age 49  Hysterectomy/Oophorectomy: hysterectomy without BSO, at age 22  Hormonal birth control (duration): Yes OCP (estrogen/progesterone). started at age 15 and stopped at age 18 due to blood clot  Pregnancy History:    Age at first live birth: 18  Hormone Replacement Therapy: No, none  Patient did not breast feed.  Patient denies nipple discharge.   Patient denies to previous breast biopsy.   Patient denies to a personal history of breast cancer.           Other Relevant History:  Prior thoracic RT: none  Genetic testing: No  Ashkenazi Hindu descent: No     Family History:  Family History   Problem Relation Name Age of Onset    Cervical cancer Mother      Diabetes Mother      Hypertension Mother      Prostate cancer Father  79    Prostate cancer Brother  68    Hypertension Brother      Brain cancer Child  6    Cancer Maternal Uncle      Cancer Maternal Uncle      Cancer Maternal Uncle          Past History:  Past Medical History:   Diagnosis Date    Breast cancer     Depression     History of blood clots     Hypertension     Lipoma of anterior chest wall         Past Surgical History:   Procedure Laterality Date    APPENDECTOMY      CHOLECYSTECTOMY      RENETTA FILTER PLACEMENT          HYSTERECTOMY      KNEE SURGERY Left     MASTECTOMY, PARTIAL Left 2024    Procedure: MASTECTOMY, PARTIAL - left  w/ radiological marker;  Surgeon: Deirdre Welch MD;  Location: [facility];  Service: General;  Laterality: Left;    NECK SURGERY      PLACEMENT, INFERIOR VENA CAVA FILTER Left     SHOULDER ARTHROSCOPY W/ ROTATOR CUFF REPAIR Left     STENT PLACEMENT/PRIOR TO CALIN Left         Social History     Socioeconomic History    Marital status:    Tobacco Use    Smoking status: Never    Smokeless tobacco: Never   Substance and Sexual Activity    Alcohol use: Never    Drug use: Never     Social Drivers of Health     Financial Resource Strain: Low Risk  (2023)    Received from [facility] Subsidi[facility] and Affili[facility]    Overall Financial Resource Strain (CARDIA)     Difficulty of Paying Living Expenses: Not hard at all   Food Insecurity: No Food Insecurity (2023)     Received from [facility]sidi[facility] and Affili[facility]    Hunger Vital Sign     Worried About Running Out of Food in the Last Year: Never true     Ran Out of Food in the Last Year: Never true        Body mass index is 36.96 kg/m².     Allergy/Medications:   Review of patient's allergies indicates:   Allergen Reactions    Amitriptyline Other (See Comments)     Other Reaction(s): muscle tightening    MUSCLE WEAKNESS    Adhesive Rash    Anastrozole Nausea And Vomiting and Other (See Comments)     Dizziness    Enoxaparin Rash    Methocarbamol Rash and Other (See Comments)     MUSCLE WEAKNESS    Rivaroxaban Rash    Warfarin Rash          Current Outpatient Medications:     acetaminophen-codeine 300-30mg (TYLENOL #3) 300-30 mg Tab, Take 1 tablet by mouth., Disp: , Rfl:     amLODIPine (NORVASC) 10 MG tablet, Take 10 mg by mouth., Disp: , Rfl:     calcium carbonate (CALCIUM 500 ORAL), Take by mouth., Disp: , Rfl:     clopidogreL (PLAVIX) 75 mg tablet, , Disp: , Rfl:     ergocalciferol, vitamin D2, (VITAMIN D ORAL), Take by mouth., Disp: , Rfl:     furosemide (LASIX) 40 MG tablet, , Disp: , Rfl:     irbesartan (AVAPRO) 300 MG tablet, Take 300 mg by mouth., Disp: , Rfl:     buPROPion (WELLBUTRIN XL) 300 MG 24 hr tablet, TAKE 1 TABLET BY MOUTH EVERY MORNING FOR DEPRESSION (Patient not taking: Reported on 1/30/2025), Disp: , Rfl:     exemestane (AROMASIN) 25 mg tablet, Take 1 tablet (25 mg total) by mouth once daily. (Patient not taking: Reported on 1/30/2025.), Disp: 30 tablet, Rfl: 3    letrozole (FEMARA) 2.5 mg Tab, Take 1 tablet (2.5 mg total) by mouth once daily. (Patient not taking: Reported on 1/30/2025.), Disp: 30 tablet, Rfl: 3    mupirocin (BACTROBAN) 2 % ointment, Apply topically 3 (three) times daily. (Patient not taking: Reported on 1/30/2025), Disp: 22 g, Rfl: 0    oxyCODONE-acetaminophen (PERCOCET)  mg per tablet, Take 1 tablet by mouth every 6 (six) hours as  "needed for Pain., Disp: 12 tablet, Rfl: 0    rosuvastatin (CRESTOR) 10 MG tablet, Take 10 mg by mouth. (Patient not taking: Reported on 1/30/2025), Disp: , Rfl:        Review of Systems:  ROS        Objective:     Vitals:  Blood pressure 123/69, pulse 84, temperature 98.1 °F (36.7 °C), temperature source Oral, resp. rate 20, height 5' 7" (1.702 m), weight 107 kg (236 lb), SpO2 99%.      Physical Exam:  General: The patient is awake, alert and oriented times three. The patient is well nourished and in no acute distress.  Neck: There is no evidence of palpable cervical, supraclavicular or axillary adenopathy. The neck is supple. The thyroid is not enlarged.  Musculoskeletal: The patient has a normal range of motion of her bilateral upper extremities.  Chest: Examination of the chest wall fails to reveal any obvious abnormalities. Nonlabored breathing, symmetric expansion.  Breast:  Right:  Examination of right breast fails to reveal any dominant masses or areas of significant focal nodularity. The nipple is everted without evidence of discharge. There is no skin dimpling with movement of the pectoralis. There are no significant skin changes overlying the breast.   Left: Large 7 cm firm lump at lumpectomy site in the UIQ consistent with post op hematoma. Examination of the left breast fails to reveal any dominant masses or areas of significant focal nodularity. The nipple is everted without evidence of discharge. There is no skin dimpling with movement of the pectoralis. There are no significant skin changes overlying the breast.  Abdomen: The abdomen is soft, flat, nontender and nondistended.  Integumentary: no rashes or skin lesions present  Neurologic: cranial nerves intact, no signs of peripheral neurological deficit, motor/sensory function intact    Aspiration and Drainage of Hematoma with Ultrasound Guidance Procedure Note    Pre-operative Diagnosis: left breast hematoma    Post-operative Diagnosis: " same    Location: left breast    Pre-Procedure Exam: A pre-procedure ultrasound was performed. There was a note of fluid in the lumpectomy bed. After confirming fluid was still present we elected to proceed with aspiration and drainage.    Time Out: Performed and documented by MA    Anesthesia: 1% plain lidocaine    Procedure Details   The Procedure, risks and complications have been discussed in detail (including, but not limited to pain, infection, bleeding, and recurrence) with the patient, and the patient has signed consent to have the surgery completed.     An ultrasound guided aspiration and drainage using real-time ultrasound was performed. Standard sterile technique was used throughout the procedure. The skin was prepped in the usual manner with ChloraPrep. An 18-gauge needle with butterfly catheter was inserted into the fluid collection under direct visualization using ultrasound. Dark bloody fluid was then expressed. A total of 30 mL was removed and the cavity was seen to almost completely collapse. The insertion site was cleaned and covered with a band-aid. This concluded the procedure.    Post-procedure the seroma cavity was seen to nearly collapse. She tolerated the procedure well.    EBL: minimal    Condition:  Stable    Complications:  none.           Needle placement into hematoma.      30 cc of fluid aspirated and this is what remained on US prior to needle removal.      S/P aspiration        Assessment and Plan:      Cancer Staging   Ductal carcinoma in situ (DCIS) of left breast  Staging form: Breast, AJCC 8th Edition  - Clinical stage from 7/9/2024: Stage 0 (cTis (DCIS), cN0, cM0, ER+, MS+, HER2: Not Assessed) - Signed by Deirdre Welch MD on 7/9/2024  - Pathologic stage from 7/30/2024: Stage 0 (pTis (DCIS), cN0, cM0, ER+, MS+, HER2: Not Assessed) - Signed by Deirdre Welch MD on 7/30/2024      Encounter Diagnoses   Name Primary?    Ductal carcinoma in situ (DCIS) of left breast Yes     Hematoma of left breast              Plan:       Aspiration performed today to recurrence of left breast hematoma. 30 cc drained and area was seen to almost completely collapse on US. Advised compression with bra and ace wrap to prevent recurrence. Ice advised. Post op limitations discussed including avoiding lifting heavier than 10 pounds and to avoid repetitive arm motions. Recommend tylenol and ice for pain.    BL DG MG in April 2025.    Patient has discontinued adjuvant endocrine therapy due to side effects. Recommend follow up with medical oncology regarding this.    Healthy lifestyle guidelines were reviewed. She was encouraged to engage in regular exercise, maintain a healthy body weight, and avoid excessive alcohol consumption. Healthy nutritional guidelines were also discussed. Self-breast examination was reviewed with the patient in detail and she was encouraged to perform this on a monthly basis.       All of her questions were answered. She was advised to call if she develops any questions or concerns.    Daysi Naidu PA-C           Total time on the date of the visit ranged from 30-39 mins (58658). Total time includes both face-to-face and non-face-to-face time personally spent by myself on the day of the visit.    Non-face-to-face time included:  _X_ preparing to see the patient such as reviewing the patient record  __ obtaining and reviewing separately obtained history  _X_ independently interpreting results  _X_ documenting clinical information in electronic health record.    Face-to-face time included:  _X_ performing an appropriate history and examination  _X_ communicating results to the patient  _X_ counseling and educating the patient  __ ordering appropriate medications  _x_ ordering appropriate tests  _X_ ordering appropriate procedures (including follow-up)  _X_ answering any questions the patient had    Total Time spent on date of visit: 34 minutes

## 2025-01-30 NOTE — PATIENT INSTRUCTIONS
How do I care for my surgical site?  ·Bandages - You may remove bandage next day after procedure. If the dressing comes loose before 24 hours, you can re-tape it.  ·Cleaning - You should avoid baths, hot tubs, or swimming for 1-2 weeks after the procedure. It is okay to shower. Clean the wound once a day with mild soap and water, and pat dry with gauze or tissue. Do not scrub incision or wound. May cover the wound with a bandage or non-stick pad and tape to keep it from rubbing.  ·Exercise - Avoid vigorous exercise that increases your heart rate and blood pressure for two days. It is okay to walk.   ·Bleeding - If the biopsy site bleeds, apply direct pressure for 10-20 minutes. If the bleeding doesn't stop, call your doctor.  ·Sutures - If you have sutures, we can remove them at your follow up appointment. Sometimes, sutures are dissolvable and do not need removing.  After the first 24 hours, you can get the sutures wet in the shower.   ·Sun protection - Once the skin is healed, you should practice good sun protection to reduce the risk of scarring and discoloration. You can apply sunscreen to the area and to all skin that isn't covered by clothing, as well as lip balm to your lips. Choose sunscreen and lip balm with broad-spectrum protection, water resistance, and an SPF of 30 or higher. You can also cover the area with clothing, a wide-brimmed hat, or shoes.    Will I have pain when I am home?  The length of time each person has pain or discomfort varies. Follow the guidelines below to manage your pain.  · Icing the affected area can also alleviate pain symptoms (ice the affected area at least four times a day, 15-20 minutes at a time).  · A pain reliever such as acetaminophen (Tylenol) or ibuprofen (Advil) will relieve aches and discomfort. However, large quantities of these medications may be harmful. Do not take more than the amount directed on the bottle or as instructed by your doctor or nurse.  · Call your  doctor if the pain medication doesnt relieve your pain.  · Pain medication should help you as you resume your normal activities. Pain medication is most effective 30 to 45 minutes after taking it.  · Keep track of when you take your pain medication. Taking it when your pain first begins is more effective than waiting for the pain to get worse.    How long until I have the pathology results?  If any specimens were sent for pathology, these reports usually takes to 7 to 10 business days.    How can I cope with my feelings?   After surgery for a serious illness, you may have new and upsetting feelings. Many patients say they felt sad, worried, nervous, irritable, or angry at one time or another. You may find that you cannot control some of these feelings. If this happens, its a good idea to seek emotional support.  The first step in coping is to talk about how you feel. Family and friends can help. Your nurse, doctor, and  can reassure, support, and guide you. It is always a good idea to let these professionals know how you, your family, and your friends are feeling emotionally. Many resources are available to patients and their families. Whether you are in the hospital or at home, we are here to help you and your family and friends handle the emotional aspects of your illness.    What if I have other questions?  If you have any questions or concerns, please talk with your doctor or nurse. You can reach them Monday through Thursday from 9:00 AM to 5:00 PM and Friday from 9:00 AM to 12:00 PM at [phone].  After 5:00 PM M-Th or 12:00 PM Fri, during the weekend, and on holidays, please call [phone] and ask for the doctor on call.    PLEASE CALL YOUR DOCTOR OR NURSE IF YOU HAVE:  · A temperature of 101° F (38.3° C) or higher  · Shortness of breath  · Warmer than normal skin around your incision  · Increased discomfort in the area  · Increased redness around your incision  · New or increased swelling  around your incision  · Discharge from your incision

## 2025-02-07 ENCOUNTER — OFFICE VISIT (OUTPATIENT)
Dept: SURGERY | Facility: CLINIC | Age: 65
End: 2025-02-07
Payer: MEDICARE

## 2025-02-07 VITALS
RESPIRATION RATE: 18 BRPM | BODY MASS INDEX: 37.04 KG/M2 | TEMPERATURE: 98 F | HEART RATE: 74 BPM | OXYGEN SATURATION: 98 % | HEIGHT: 67 IN | SYSTOLIC BLOOD PRESSURE: 135 MMHG | DIASTOLIC BLOOD PRESSURE: 79 MMHG | WEIGHT: 236 LBS

## 2025-02-07 DIAGNOSIS — D05.12 DUCTAL CARCINOMA IN SITU (DCIS) OF LEFT BREAST: Primary | ICD-10-CM

## 2025-02-07 DIAGNOSIS — N64.89 HEMATOMA OF LEFT BREAST: ICD-10-CM

## 2025-02-07 PROCEDURE — 99999 PR PBB SHADOW E&M-EST. PATIENT-LVL V: CPT | Mod: PBBFAC,,, | Performed by: PHYSICIAN ASSISTANT

## 2025-02-07 PROCEDURE — 99215 OFFICE O/P EST HI 40 MIN: CPT | Mod: PBBFAC | Performed by: PHYSICIAN ASSISTANT

## 2025-02-07 PROCEDURE — 99024 POSTOP FOLLOW-UP VISIT: CPT | Mod: POP,,, | Performed by: PHYSICIAN ASSISTANT

## 2025-02-07 NOTE — PROGRESS NOTES
[facility] Breast Surg  Breast Surgical Oncology  Follow-Up Patient Office Visit       Referring Provider: Antoinette Elizondo   PCP: Antoinette Elizondo NP   Medical Oncologist: Dr. Leah Rivera  Radiation Oncologist: Dr. Addie Jimenez  Other Care Providers:     Chief Complaint:   Chief Complaint   Patient presents with    Post-op Evaluation     Patient c/o intermittent left breast pain x 1 week mild swelling, no redness        Subjective:   Treatment History:  7/22/2024 Left Lumpectomy  10/2024 [facility] Hexadite CancerNext-Expanded analysis - negative  Adjuvant radiation completed on 10/1/24.   Adjuvant Arimidex started 10/2024, but recently stopped s/t hallucinations. Started Letrozole 11/2024 but discontinued shortly after s/t intolerance. Declined further endocrine therapy.    Interval History:  2/7/2025 - Barbara Alvarez presents today for evaluation of a left breast hematoma s/p aspiration on 1/16 with radiology and a repeat aspiration in our office on 1/30. Denies redness, warmth, fluctuance, fevers. States that symptoms are much improved since recent aspiration. She states she still has occasional pains but not as bad as before.    HPI:  Barbara Alvarez is a 64 y.o. female who presents on 7/9/2024 for evaluation of newly diagnosed left  breast cancer. Biopsy of left breast mass positive for DCIS     A detailed patient history was obtained and reviewed. She currently denies any breast issues including rashes, redness, pain, swelling, nipple discharge, or new lumps/masses.     Of note, pt developed a blood clot after her first pregnancy and was taken off birth control medications. She had an IVC filter placed and is currently taking Plavix. She see Antoinette Elizondo NP for management of her Plavix.     MG breast density: Category B (scattered areas of fibroglandular tissue)      Imaging:  Bilateral screening mammogram 4/29/2024:   IMPRESSION: INCOMPLETE: NEEDS ADDITIONAL IMAGING EVALUATION  The  oval mass in the 12:00 left breast needs further evaluation.       Left diagnostic mammogram 6/10/2024:   IMPRESSION: SUSPICIOUS OF MALIGNANCY  Left breast 11:00 10 cm from the nipple posterior depth 0.7 cm irregular hypoechoic mass with angular/spiculated margins is suspicious for malignancy. BIRADS 4  Left breast US guided biopsy 7/1/2024:   IMPRESSION: ULTRASOUND GUIDED BIOPSY MALIGNANT   Ultrasound guided biopsy of the 0.7 cm mass in the left breast at 11:00 posterior depth 10 cm from the nipple with placement of a HydroMARK T3 coil-shaped clip was successful with no apparent post procedure complications.    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  Pathology results are concordant with mammography and ultrasound findings.       Left breast DG MG and US 1/16/2025 to evaluate painful area of palpable concern at the surgical site, increasing in size.   Probable evolving hematoma versus complex seroma in the superior left breast, accounting for the area of patient concern. (Located at at 11:00 10 cmfn, measures 6.5 cm on US)  No radiographic evidence of left breast malignancy.       Pathology:  6/20/2024  Left breast mass at 11:00 10 CMFN, US biopsy: DCIS, G2, predominately cribriform and papillary types, 0.4 cm in greatest dimension. ER 90-95%, [place] 70-75%    7/22/2024  Left Lumpectomy  Ductal carcinoma in situ with prominent apocrine features, grade 2-3. Cribriform and papillary patterns; no comedo necrosis identified; calcium phosphate focal microcalcifications present; biopsy site changes associated with ductal carcinoma in situ.  All margins clear.     OB/GYN History:  Age at Menarche Onset: 11  Menopausal Status: postmenopausal, LMP: at age 49  Hysterectomy/Oophorectomy: hysterectomy without BSO, at age 22  Hormonal birth control (duration): Yes OCP (estrogen/progesterone). started at age 15 and stopped at age 18 due to blood  clot  Pregnancy History:   Age at first live birth: 18  Hormone Replacement Therapy: No, none  Patient did not breast feed.  Patient denies nipple discharge.   Patient denies to previous breast biopsy.   Patient denies to a personal history of breast cancer.           Other Relevant History:  Prior thoracic RT: none  Genetic testing: No  Ashkenazi Yazidi descent: No     Family History:  Family History   Problem Relation Name Age of Onset    Cervical cancer Mother      Diabetes Mother      Hypertension Mother      Prostate cancer Father  79    Prostate cancer Brother  68    Hypertension Brother      Brain cancer Child  6    Cancer Maternal Uncle      Cancer Maternal Uncle      Cancer Maternal Uncle          Past History:  Past Medical History:   Diagnosis Date    Breast cancer     Depression     History of blood clots     Hypertension     Lipoma of anterior chest wall         Past Surgical History:   Procedure Laterality Date    APPENDECTOMY      CHOLECYSTECTOMY      RENETTA FILTER PLACEMENT          HYSTERECTOMY      KNEE SURGERY Left     MASTECTOMY, PARTIAL Left 2024    Procedure: MASTECTOMY, PARTIAL - left  w/ radiological marker;  Surgeon: Deirdre Welch MD;  Location: [facility];  Service: General;  Laterality: Left;    NECK SURGERY      PLACEMENT, INFERIOR VENA CAVA FILTER Left     SHOULDER ARTHROSCOPY W/ ROTATOR CUFF REPAIR Left     STENT PLACEMENT/PRIOR TO CALIN Left         Social History     Socioeconomic History    Marital status:    Tobacco Use    Smoking status: Never    Smokeless tobacco: Never   Substance and Sexual Activity    Alcohol use: Never    Drug use: Never     Social Drivers of Health     Financial Resource Strain: Low Risk  (2023)    Received from [facility] Subsidi[facility]    Overall Financial Resource Strain (CARDIA)     Difficulty of Paying Living Expenses: Not hard at all   Food Insecurity: No Food  Insecurity (11/14/2023)    Received from [facility] and [facility]    Hunger Vital Sign     Worried About Running Out of Food in the Last Year: Never true     Ran Out of Food in the Last Year: Never true        Body mass index is 36.96 kg/m².     Allergy/Medications:   Review of patient's allergies indicates:   Allergen Reactions    Amitriptyline Other (See Comments)     Other Reaction(s): muscle tightening    MUSCLE WEAKNESS    Adhesive Rash    Anastrozole Nausea And Vomiting and Other (See Comments)     Dizziness    Enoxaparin Rash    Methocarbamol Rash and Other (See Comments)     MUSCLE WEAKNESS    Rivaroxaban Rash    Warfarin Rash          Current Outpatient Medications:     acetaminophen-codeine 300-30mg (TYLENOL #3) 300-30 mg Tab, Take 1 tablet by mouth., Disp: , Rfl:     amLODIPine (NORVASC) 10 MG tablet, Take 10 mg by mouth., Disp: , Rfl:     buPROPion (WELLBUTRIN XL) 300 MG 24 hr tablet, TAKE 1 TABLET BY MOUTH EVERY MORNING FOR DEPRESSION (Patient not taking: Reported on 1/30/2025), Disp: , Rfl:     calcium carbonate (CALCIUM 500 ORAL), Take by mouth., Disp: , Rfl:     clopidogreL (PLAVIX) 75 mg tablet, , Disp: , Rfl:     ergocalciferol, vitamin D2, (VITAMIN D ORAL), Take by mouth., Disp: , Rfl:     exemestane (AROMASIN) 25 mg tablet, Take 1 tablet (25 mg total) by mouth once daily. (Patient not taking: Reported on 1/30/2025.), Disp: 30 tablet, Rfl: 3    furosemide (LASIX) 40 MG tablet, , Disp: , Rfl:     irbesartan (AVAPRO) 300 MG tablet, Take 300 mg by mouth., Disp: , Rfl:     letrozole (FEMARA) 2.5 mg Tab, Take 1 tablet (2.5 mg total) by mouth once daily. (Patient not taking: Reported on 1/30/2025.), Disp: 30 tablet, Rfl: 3    mupirocin (BACTROBAN) 2 % ointment, Apply topically 3 (three) times daily. (Patient not taking: Reported on 1/30/2025), Disp: 22 g, Rfl: 0    oxyCODONE-acetaminophen (PERCOCET)  mg per tablet, Take 1 tablet by mouth  "every 6 (six) hours as needed for Pain., Disp: 12 tablet, Rfl: 0    rosuvastatin (CRESTOR) 10 MG tablet, Take 10 mg by mouth. (Patient not taking: Reported on 1/30/2025), Disp: , Rfl:        Review of Systems:  ROS        Objective:     Vitals:  Blood pressure 135/79, pulse 74, temperature 98 °F (36.7 °C), temperature source Oral, resp. rate 18, height 5' 7" (1.702 m), weight 107 kg (236 lb), SpO2 98%.      Physical Exam:  General: The patient is awake, alert and oriented times three. The patient is well nourished and in no acute distress.  Neck: There is no evidence of palpable cervical, supraclavicular or axillary adenopathy. The neck is supple. The thyroid is not enlarged.  Musculoskeletal: The patient has a normal range of motion of her bilateral upper extremities.  Chest: Examination of the chest wall fails to reveal any obvious abnormalities. Nonlabored breathing, symmetric expansion.  Breast:  Right:  Examination of right breast fails to reveal any dominant masses or areas of significant focal nodularity. The nipple is everted without evidence of discharge. There is no skin dimpling with movement of the pectoralis. There are no significant skin changes overlying the breast.   Left: Large firm lump at lumpectomy site in the UIQ consistent with post op scarring. US performed revealing small fluid collection, significantly smaller than previous US. Examination of the left breast fails to reveal any dominant masses or areas of significant focal nodularity. The nipple is everted without evidence of discharge. There is no skin dimpling with movement of the pectoralis. There are no significant skin changes overlying the breast.  Abdomen: The abdomen is soft, flat, nontender and nondistended.  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    Reassurance provided. I explained that hematomas may cause changes in the breast tissue that may be evident on future mammograms as changes or Microcalcifications in the area. I advised that she inform radiology and mammogram techs at the time of her future mammograms to discuss location of injury and hematoma history.          Plan:       Her hematoma is significantly improved and much smaller on today's US (nearly collapsed). Discussed would recommend conservative management from here because most hematomas are self-limiting-they are not dangerous and in time will resolve without intervention.    Treatment for painful hematomas include taking analgesics such as Tylenol. She was also advised to wear supportive good fitting bra, including wearing this at night while sleeping. I also recommend limiting activities that involve a lot of arm or body movements that may exacerbate breast pain. May gradually return to activities as tolerated.    BL DG MG in April 2025.    Patient has discontinued adjuvant endocrine therapy due to side effects. Recommend follow up with medical oncology regarding this.    Healthy lifestyle guidelines were reviewed. She was encouraged to engage in regular exercise, maintain a healthy body weight, and avoid excessive alcohol consumption. Healthy nutritional guidelines were also discussed. Self-breast examination was reviewed with the patient in detail and she was encouraged to perform this on a monthly basis.       All of her questions were answered. She was advised to call if she develops any questions or  concerns.    Daysi Naidu PA-C           Total time on the date of the visit ranged from 30-39 mins (72160). Total time includes both face-to-face and non-face-to-face time personally spent by myself on the day of the visit.    Non-face-to-face time included:  _X_ preparing to see the patient such as reviewing the patient record  __ obtaining and reviewing separately obtained history  _X_ independently interpreting results  _X_ documenting clinical information in electronic health record.    Face-to-face time included:  _X_ performing an appropriate history and examination  _X_ communicating results to the patient  _X_ counseling and educating the patient  __ ordering appropriate medications  _x_ ordering appropriate tests  _X_ ordering appropriate procedures (including follow-up)  _X_ answering any questions the patient had    Total Time spent on date of visit: 34 minutes

## 2025-02-10 PROBLEM — E66.01 SEVERE OBESITY (BMI 35.0-39.9) WITH COMORBIDITY: Status: ACTIVE | Noted: 2024-09-23

## 2025-02-10 NOTE — PROGRESS NOTES
Referring physician: Dr. Deirdre Welch  Reason for referral: DCIS      Subjective:       Patient ID: Barbara Alvarez is a 65 y.o. female.    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   Surgery/pathology:  Left partial mastectomy done 7/22/24--DCIS, nuclear Grade 2-3, with prominent apocrine features, no necrosis, measures 10mm, cribriform and papillary patterns, no comedonecrosis, calcium phosphate focal microcalcifications present, biopsy site changes associated with DCIS, margins negative.   Imaging:  Screening MMG 4/29/24--oval mass in the 12:00 left breast, posterior depth.  No other significant masses, calcifications, or other findings are seen in either breast.  BIRADS 0, needs additional evaluation.  Left Diagnostic MMG/US done 6/10/24--Left breast 11:00 10 cm from the nipple posterior depth 0.7 cm irregular hypoechoic mass with angular/spiculated margins is suspicious for malignancy.  BIRADS 4, biopsy recommended.    DEXA:  4/26/24--Lumbar vertebrae T = 0.4, left femoral neck -1.6, right femoral neck -1.4, c/w osteopenia.     Treatment history:   Left breast lumpectomy 7/22/24.  Adjuvant radiation completed on 10/1/24.     Current treatment plan:  Arimidex daily started on 10/6/24. Stopped on 10/25/24 due to hallucinations.   Attempted Femara in November but stopped due to hallucinations.   Refusing to try additional medications.     Chief Complaint: Fatigue (Pt reports that she has pain in L breast and has had a bloody fluid drained twice by Dr Welch. 60 cc's the first and 30 cc's the next time.) and Other Misc (Pt states that she tool Aromasin for about two weeks in Nov and had hallucinations and stopped. Then took Letrozole for about a week and had the same symptoms. Pt states that she has not taken a hormone blocker  since Nov 2024.)    HPI  The patient presents for scheduled follow-up for her DCIS. She is doing well. Originally started her on Arimidex but stopped after 2 weeks due to hallucinations and feeling too scared to be alone. She had the same symptoms with Femara so she stopped in November 2024. Patient refused to start another AI. She also had a left diagnostic MMG and US on 1/16/25 after having increasing left breast mass and pain. Results showed evolving hematoma versus complex seroma. S/p aspiration x 2 with results benign. Recommend she continue with routine screening MMG due in April 2025. States the seroma has been stable since her last drainage. No other problems reported today.     Past Medical History:   Diagnosis Date    Breast cancer     Depression     History of blood clots     Hypertension     Lipoma of anterior chest wall       Past Surgical History:   Procedure Laterality Date    APPENDECTOMY      CHOLECYSTECTOMY      RENETTA FILTER PLACEMENT      2015    HYSTERECTOMY      KNEE SURGERY Left     MASTECTOMY, PARTIAL Left 7/22/2024    Procedure: MASTECTOMY, PARTIAL - left  w/ radiological marker;  Surgeon: Deirdre Welch MD;  Location: [facility];  Service: General;  Laterality: Left;    NECK SURGERY      PLACEMENT, INFERIOR VENA CAVA FILTER Left     SHOULDER ARTHROSCOPY W/ ROTATOR CUFF REPAIR Left     STENT PLACEMENT/PRIOR TO CALIN Left 2015   Patient has h/o DVT after her first pregnancy and was taken off birth control, had recurrence of DVT after neck surgery in E and has h/o IVC filter placement and she is currently on Plavix.  Unable to take Coumadin, Lovenox or DOAC due to allergy, got rash.   Family History   Problem Relation Name Age of Onset    Cervical cancer Mother      Diabetes Mother      Hypertension Mother      Prostate cancer Father  79    Prostate cancer Brother  68    Hypertension Brother      Brain cancer Child  6    Cancer Maternal Uncle      Cancer Maternal Uncle      Cancer  Maternal Uncle       Social History     Socioeconomic History    Marital status:    Tobacco Use    Smoking status: Never    Smokeless tobacco: Never   Substance and Sexual Activity    Alcohol use: Never    Drug use: Never     Social Drivers of Health     Financial Resource Strain: Low Risk  (2023)    Received from [facility]    Overall Financial Resource Strain (CARDIA)     Difficulty of Paying Living Expenses: Not hard at all   Food Insecurity: No Food Insecurity (2023)    Received from [facility]    Hunger Vital Sign     Worried About Running Out of Food in the Last Year: Never true     Ran Out of Food in the Last Year: Never true   Menarche age 11, , +OCPs, stopped age 18 due to DVT, age at first live birth 18, menopause age 49, no HRT. FH mother with cervical cancer, brother with prostate cancer, child with brain cancer,  at age 6, likely medulloblastoma.     Review of patient's allergies indicates:   Allergen Reactions    Amitriptyline Other (See Comments)     Other Reaction(s): muscle tightening    MUSCLE WEAKNESS    Wellbutrin [bupropion hcl] Hallucinations    Adhesive Rash    Anastrozole Nausea And Vomiting and Other (See Comments)     Dizziness    Enoxaparin Rash    Methocarbamol Rash and Other (See Comments)     MUSCLE WEAKNESS    Rivaroxaban Rash    Warfarin Rash      Current Outpatient Medications on File Prior to Visit   Medication Sig Dispense Refill    acetaminophen-codeine 300-30mg (TYLENOL #3) 300-30 mg Tab Take 1 tablet by mouth.      amLODIPine (NORVASC) 10 MG tablet Take 10 mg by mouth.      calcium carbonate (CALCIUM 500 ORAL) Take by mouth.      clopidogreL (PLAVIX) 75 mg tablet       ergocalciferol, vitamin D2, (VITAMIN D ORAL) Take by mouth.      furosemide (LASIX) 40 MG tablet       irbesartan (AVAPRO) 300 MG tablet  "Take 300 mg by mouth.      rosuvastatin (CRESTOR) 10 MG tablet Take 10 mg by mouth. (Patient not taking: Reported on 2/17/2025)      [DISCONTINUED] buPROPion (WELLBUTRIN XL) 300 MG 24 hr tablet TAKE 1 TABLET BY MOUTH EVERY MORNING FOR DEPRESSION (Patient not taking: Reported on 2/17/2025)      [DISCONTINUED] exemestane (AROMASIN) 25 mg tablet Take 1 tablet (25 mg total) by mouth once daily. (Patient not taking: Reported on 2/17/2025) 30 tablet 3    [DISCONTINUED] letrozole (FEMARA) 2.5 mg Tab Take 1 tablet (2.5 mg total) by mouth once daily. (Patient not taking: Reported on 2/17/2025) 30 tablet 3    [DISCONTINUED] mupirocin (BACTROBAN) 2 % ointment Apply topically 3 (three) times daily. (Patient not taking: Reported on 2/17/2025) 22 g 0    [DISCONTINUED] oxyCODONE-acetaminophen (PERCOCET)  mg per tablet Take 1 tablet by mouth every 6 (six) hours as needed for Pain. (Patient not taking: Reported on 2/17/2025) 12 tablet 0     No current facility-administered medications on file prior to visit.      Review of Systems   Constitutional:  Negative for unexpected weight change.   HENT:  Negative for mouth sores.    Eyes:  Negative for visual disturbance.   Respiratory:  Negative for cough.    Gastrointestinal:  Negative for abdominal pain and diarrhea.   Genitourinary:  Negative for frequency.   Musculoskeletal:  Negative for back pain.   Integumentary:  Negative for rash.        Left breast seroma   Neurological:  Negative for headaches.   Hematological:  Negative for adenopathy.   Psychiatric/Behavioral:  The patient is nervous/anxious.          Vitals:    02/17/25 1032   BP: (!) 142/84   Pulse: 88   Resp: 14   Temp: 98 °F (36.7 °C)   TempSrc: Oral   SpO2: 97%   Weight: 106 kg (233 lb 11.2 oz)   Height: 5' 7" (1.702 m)     Physical Exam  Constitutional:       Appearance: Normal appearance. She is obese.   HENT:      Head: Normocephalic.      Nose: Nose normal.      Mouth/Throat:      Mouth: Mucous membranes are " moist.   Eyes:      Extraocular Movements: Extraocular movements intact.      Conjunctiva/sclera: Conjunctivae normal.   Cardiovascular:      Rate and Rhythm: Normal rate and regular rhythm.   Pulmonary:      Effort: Pulmonary effort is normal.      Breath sounds: Normal breath sounds.   Chest:      Comments: Left breast incision intact, healed well. Immediately above lumpectomy incision there is a seroma present, stable per patient. Right breast normal.   Abdominal:      General: Abdomen is protuberant. Bowel sounds are normal. There is no distension.      Palpations: Abdomen is soft.      Tenderness: There is no abdominal tenderness.   Musculoskeletal:         General: Normal range of motion.   Skin:     General: Skin is warm.   Neurological:      General: No focal deficit present.      Mental Status: She is alert and oriented to person, place, and time.   Psychiatric:         Mood and Affect: Mood normal.         Behavior: Behavior is cooperative.         Judgment: Judgment normal.       Lab Visit on 02/14/2025   Component Date Value    Sodium 02/14/2025 141     Potassium 02/14/2025 3.3 (L)     Chloride 02/14/2025 105     CO2 02/14/2025 28     Glucose 02/14/2025 182 (H)     Blood Urea Nitrogen 02/14/2025 15.8     Creatinine 02/14/2025 0.93     Calcium 02/14/2025 9.6     Protein Total 02/14/2025 7.4     Albumin 02/14/2025 3.8     Globulin 02/14/2025 3.6 (H)     Albumin/Globulin Ratio 02/14/2025 1.1     Bilirubin Total 02/14/2025 0.5     ALP 02/14/2025 118     ALT 02/14/2025 18     AST 02/14/2025 16     eGFR 02/14/2025 >60     Anion Gap 02/14/2025 8.0     BUN/Creatinine Ratio 02/14/2025 17     WBC 02/14/2025 6.98     RBC 02/14/2025 4.17 (L)     Hgb 02/14/2025 11.7 (L)     Hct 02/14/2025 37.7     MCV 02/14/2025 90.4     MCH 02/14/2025 28.1     MCHC 02/14/2025 31.0 (L)     RDW 02/14/2025 13.2     Platelet 02/14/2025 384     MPV 02/14/2025 8.9     Neut % 02/14/2025 67.5     Lymph % 02/14/2025 25.6     Mono %  02/14/2025 4.7     Eos % 02/14/2025 2.0     Basophil % 02/14/2025 0.1     Imm Grans % 02/14/2025 0.1     Neut # 02/14/2025 4.70     Lymph # 02/14/2025 1.79     Mono # 02/14/2025 0.33     Eos # 02/14/2025 0.14     Baso # 02/14/2025 0.01     Imm Gran # 02/14/2025 0.01    Admission on 01/28/2025, Discharged on 01/29/2025   Component Date Value    Sodium 01/28/2025 143     Potassium 01/28/2025 3.6     Chloride 01/28/2025 109 (H)     CO2 01/28/2025 24     Glucose 01/28/2025 110     Blood Urea Nitrogen 01/28/2025 18.8     Creatinine 01/28/2025 0.78     Calcium 01/28/2025 9.8     Protein Total 01/28/2025 7.9 (H)     Albumin 01/28/2025 4.0     Globulin 01/28/2025 3.9 (H)     Albumin/Globulin Ratio 01/28/2025 1.0 (L)     Bilirubin Total 01/28/2025 0.6     ALP 01/28/2025 154 (H)     ALT 01/28/2025 34     AST 01/28/2025 19     eGFR 01/28/2025 >60     Anion Gap 01/28/2025 10.0     BUN/Creatinine Ratio 01/28/2025 24     WBC 01/28/2025 7.96     RBC 01/28/2025 4.67     Hgb 01/28/2025 12.9     Hct 01/28/2025 40.8     MCV 01/28/2025 87.4     MCH 01/28/2025 27.6     MCHC 01/28/2025 31.6 (L)     RDW 01/28/2025 12.8     Platelet 01/28/2025 377     MPV 01/28/2025 9.5     Neut % 01/28/2025 65.3     Lymph % 01/28/2025 25.8     Mono % 01/28/2025 5.8     Eos % 01/28/2025 2.3     Basophil % 01/28/2025 0.5     Imm Grans % 01/28/2025 0.3     Neut # 01/28/2025 5.21     Lymph # 01/28/2025 2.05     Mono # 01/28/2025 0.46     Eos # 01/28/2025 0.18     Baso # 01/28/2025 0.04     Imm Gran # 01/28/2025 0.02     NRBC% 01/28/2025 0.0           Assessment:       1. Ductal carcinoma in situ (DCIS) of left breast    2. Osteopenia of both hips    3. Severe obesity (BMI 35.0-39.9) with comorbidity    4. BMI 36.0-36.9,adult         Plan:       Patient with Left breast DCIS, s/p lumpectomy done 7/22/24. DCIS measured 10mm, Grade 2-3, ER and NH strongly positive.   Recommend treatment with adjuvant radiation and endocrine therapy X 5 years. Explained rationale  for treatment.     She completed adjuvant radiation on 10/1/24.   Arimidex daily started on 10/6/24. Stopped on 10/25/24 due to side effects.   Attempted Femara in November 2024 but she stopped due to return of hallucinations.   Patient now refusing to try Aromasin.   Recent labs with mild anemia - likely from recent evolving hematoma and seroma. Will monitor for now.   Patient is not a candidate for Tamoxifen. Has h/o recurrent DVT LLE, first in pregnancy and 2nd after neck surgery. Sees Dr. Omer and had stents placed, assume she had May Thurner syndrome. She got a rash with Lovenox, Coumadin and Xarelto. I suppose if she gets another blood clot, could try Eliquis. Remains on Plavix for the stents.    Patient had a recent left diagnostic MMG and US on 1/16/25 after having increasing left breast mass and pain. Results showed evolving hematoma versus complex seroma. S/p aspiration on 1/23/25 and 1/30/25 with results benign. Recommend she continue with routine screening MMG due in April 2025, ordered by surgeon.   Seroma stable today.     DEXA from 4/2024 with osteopenia. Will defer to her PCP since she is refusing AI.   Referred for genetic testing due to FH brother prostate cancer, child with brain tumor. Results negative.   Continue surveillance visits every 3 months.     All questions answered at this time.      Dorene Sanchez, [facility]     Visit today included increased complexity associated with the longitudinal care of the patient's breast cancer management.    Prior to the patient's arrival on the same day, I spent (5) minutes reviewing chart. Once in the exam room with the patient, I spent (10) minutes in the room with the member performing a history and exam as well as reviewing the test results and recommendations with the patient. After leaving the exam room, I spent an additional (5) minutes completing the electronic health record. The total time spent that day caring for the member is (20) minutes, and this  time - including the breakdown - is documented in the medical record.

## 2025-02-14 ENCOUNTER — LAB VISIT (OUTPATIENT)
Dept: LAB | Facility: HOSPITAL | Age: 65
End: 2025-02-14
Attending: INTERNAL MEDICINE
Payer: MEDICARE

## 2025-02-14 DIAGNOSIS — D05.12 DUCTAL CARCINOMA IN SITU (DCIS) OF LEFT BREAST: ICD-10-CM

## 2025-02-14 DIAGNOSIS — M85.851 OSTEOPENIA OF BOTH HIPS: ICD-10-CM

## 2025-02-14 DIAGNOSIS — M85.852 OSTEOPENIA OF BOTH HIPS: ICD-10-CM

## 2025-02-14 LAB
ALBUMIN SERPL-MCNC: 3.8 G/DL (ref 3.4–4.8)
ALBUMIN/GLOB SERPL: 1.1 RATIO (ref 1.1–2)
ALP SERPL-CCNC: 118 UNIT/L (ref 40–150)
ALT SERPL-CCNC: 18 UNIT/L (ref 0–55)
ANION GAP SERPL CALC-SCNC: 8 MEQ/L
AST SERPL-CCNC: 16 UNIT/L (ref 5–34)
BASOPHILS # BLD AUTO: 0.01 X10(3)/MCL
BASOPHILS NFR BLD AUTO: 0.1 %
BILIRUB SERPL-MCNC: 0.5 MG/DL
BUN SERPL-MCNC: 15.8 MG/DL (ref 9.8–20.1)
CALCIUM SERPL-MCNC: 9.6 MG/DL (ref 8.4–10.2)
CHLORIDE SERPL-SCNC: 105 MMOL/L (ref 98–107)
CO2 SERPL-SCNC: 28 MMOL/L (ref 23–31)
CREAT SERPL-MCNC: 0.93 MG/DL (ref 0.55–1.02)
CREAT/UREA NIT SERPL: 17
EOSINOPHIL # BLD AUTO: 0.14 X10(3)/MCL (ref 0–0.9)
EOSINOPHIL NFR BLD AUTO: 2 %
ERYTHROCYTE [DISTWIDTH] IN BLOOD BY AUTOMATED COUNT: 13.2 % (ref 11.5–17)
GFR SERPLBLD CREATININE-BSD FMLA CKD-EPI: >60 ML/MIN/1.73/M2
GLOBULIN SER-MCNC: 3.6 GM/DL (ref 2.4–3.5)
GLUCOSE SERPL-MCNC: 182 MG/DL (ref 82–115)
HCT VFR BLD AUTO: 37.7 % (ref 37–47)
HGB BLD-MCNC: 11.7 G/DL (ref 12–16)
IMM GRANULOCYTES # BLD AUTO: 0.01 X10(3)/MCL (ref 0–0.04)
IMM GRANULOCYTES NFR BLD AUTO: 0.1 %
LYMPHOCYTES # BLD AUTO: 1.79 X10(3)/MCL (ref 0.6–4.6)
LYMPHOCYTES NFR BLD AUTO: 25.6 %
MCH RBC QN AUTO: 28.1 PG (ref 27–31)
MCHC RBC AUTO-ENTMCNC: 31 G/DL (ref 33–36)
MCV RBC AUTO: 90.4 FL (ref 80–94)
MONOCYTES # BLD AUTO: 0.33 X10(3)/MCL (ref 0.1–1.3)
MONOCYTES NFR BLD AUTO: 4.7 %
NEUTROPHILS # BLD AUTO: 4.7 X10(3)/MCL (ref 2.1–9.2)
NEUTROPHILS NFR BLD AUTO: 67.5 %
PLATELET # BLD AUTO: 384 X10(3)/MCL (ref 130–400)
PMV BLD AUTO: 8.9 FL (ref 7.4–10.4)
POTASSIUM SERPL-SCNC: 3.3 MMOL/L (ref 3.5–5.1)
PROT SERPL-MCNC: 7.4 GM/DL (ref 5.8–7.6)
RBC # BLD AUTO: 4.17 X10(6)/MCL (ref 4.2–5.4)
SODIUM SERPL-SCNC: 141 MMOL/L (ref 136–145)
WBC # BLD AUTO: 6.98 X10(3)/MCL (ref 4.5–11.5)

## 2025-02-14 PROCEDURE — 36415 COLL VENOUS BLD VENIPUNCTURE: CPT

## 2025-02-14 PROCEDURE — 80053 COMPREHEN METABOLIC PANEL: CPT

## 2025-02-14 PROCEDURE — 85025 COMPLETE CBC W/AUTO DIFF WBC: CPT

## 2025-02-17 ENCOUNTER — OFFICE VISIT (OUTPATIENT)
Dept: HEMATOLOGY/ONCOLOGY | Facility: CLINIC | Age: 65
End: 2025-02-17
Payer: MEDICARE

## 2025-02-17 VITALS
OXYGEN SATURATION: 97 % | RESPIRATION RATE: 14 BRPM | SYSTOLIC BLOOD PRESSURE: 142 MMHG | BODY MASS INDEX: 36.68 KG/M2 | WEIGHT: 233.69 LBS | HEART RATE: 88 BPM | TEMPERATURE: 98 F | HEIGHT: 67 IN | DIASTOLIC BLOOD PRESSURE: 84 MMHG

## 2025-02-17 DIAGNOSIS — M85.851 OSTEOPENIA OF BOTH HIPS: ICD-10-CM

## 2025-02-17 DIAGNOSIS — M85.852 OSTEOPENIA OF BOTH HIPS: ICD-10-CM

## 2025-02-17 DIAGNOSIS — E66.01 SEVERE OBESITY (BMI 35.0-39.9) WITH COMORBIDITY: ICD-10-CM

## 2025-02-17 DIAGNOSIS — D05.12 DUCTAL CARCINOMA IN SITU (DCIS) OF LEFT BREAST: Primary | ICD-10-CM

## 2025-02-17 PROCEDURE — 99214 OFFICE O/P EST MOD 30 MIN: CPT | Mod: PBBFAC | Performed by: NURSE PRACTITIONER

## 2025-03-27 ENCOUNTER — TELEPHONE (OUTPATIENT)
Dept: SURGERY | Facility: CLINIC | Age: 65
End: 2025-03-27
Payer: MEDICARE

## 2025-03-27 NOTE — TELEPHONE ENCOUNTER
Patient called with complaints of left breast pain that is sudden and sharp. She states that she had a fluid build up the last visit, and it feels the same way.  Patient denies any redness, warmth or swelling to left breast.  Patient requesting to be seen. Patient given 3/27/2025 at 1300, but states that she has a bus route to do and needs to be there at 1330. Patient scheduled for 4/1/2025 at 0940.

## 2025-03-31 NOTE — PROGRESS NOTES
[facility] Breast Surg  Breast Surgical Oncology  Follow-Up Patient Office Visit       Referring Provider: Antoinette Elizondo   PCP: Antoinette Elizondo NP   Medical Oncologist: Dr. Leah Rivera  Radiation Oncologist: Dr. Addie Jimenez  Other Care Providers:     Chief Complaint:   Chief Complaint   Patient presents with    Follow-up     Patient c/o intermittent sharp left breast pain x 6 days mild swelling, no redness, no discharge       Subjective:   Treatment History:  7/22/2024 Left Lumpectomy  10/2024 [facility] CancerNext-Expanded analysis - negative  Adjuvant radiation completed on 10/1/24.   Adjuvant Arimidex started 10/2024, but recently stopped s/t hallucinations. Started Letrozole 11/2024 but discontinued shortly after s/t intolerance. Declined further endocrine therapy.    Interval History:  4/1/2025 - Barbara Alvarez presents today for evaluation of left breast pain x's 7 days. Other than the pain, she has noticed no changes to the breast since she was last seen in February. Denies increased swelling, lumps, skin changes, warmth, redness, or fevers. She does have history of a left breast hematoma s/p aspiration on 1/16 with radiology and a repeat aspiration in our office on 1/30. She has had breast pains since her surgery but these primarily resolved after her hematoma aspiration. Pain is somewhat improved with supportive bra wear.    HPI:  Barbara Alvarez is a 65 y.o. female who presents on 7/9/2024 for evaluation of newly diagnosed left breast cancer. Biopsy of left breast mass positive for DCIS     A detailed patient history was obtained and reviewed. She currently denies any breast issues including rashes, redness, pain, swelling, nipple discharge, or new lumps/masses.     Of note, pt developed a blood clot after her first pregnancy and was taken off birth control medications. She had an IVC filter placed and is currently taking Plavix. She see Antoinette Elizondo NP for management  of her Plavix.     MG breast density: Category B (scattered areas of fibroglandular tissue)      Imaging:  Bilateral screening mammogram 4/29/2024:   IMPRESSION: INCOMPLETE: NEEDS ADDITIONAL IMAGING EVALUATION  The oval mass in the 12:00 left breast needs further evaluation.       Left diagnostic mammogram 6/10/2024:   IMPRESSION: SUSPICIOUS OF MALIGNANCY  Left breast 11:00 10 cm from the nipple posterior depth 0.7 cm irregular hypoechoic mass with angular/spiculated margins is suspicious for malignancy. BIRADS 4  Left breast US guided biopsy 7/1/2024:   IMPRESSION: ULTRASOUND GUIDED BIOPSY MALIGNANT   Ultrasound guided biopsy of the 0.7 cm mass in the left breast at 11:00 posterior depth 10 cm from the nipple with placement of a HydroMARK T3 coil-shaped clip was successful with no apparent post procedure complications.    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  Pathology results are concordant with mammography and ultrasound findings.       Left breast DG MG and US 1/16/2025 to evaluate painful area of palpable concern at the surgical site, increasing in size.   Probable evolving hematoma versus complex seroma in the superior left breast, accounting for the area of patient concern. (Located at at 11:00 10 cmfn, measures 6.5 cm on US)  No radiographic evidence of left breast malignancy.       Pathology:  6/20/2024  Left breast mass at 11:00 10 CMFN, US biopsy: DCIS, G2, predominately cribriform and papillary types, 0.4 cm in greatest dimension. ER 90-95%, NV 70-75%    7/22/2024  Left Lumpectomy  Ductal carcinoma in situ with prominent apocrine features, grade 2-3. Cribriform and papillary patterns; no comedo necrosis identified; calcium phosphate focal microcalcifications present; biopsy site changes associated with ductal carcinoma in situ.  All margins clear.     OB/GYN History:  Age at Menarche Onset: 11  Menopausal Status:  postmenopausal, LMP: at age 49  Hysterectomy/Oophorectomy: hysterectomy without BSO, at age 22  Hormonal birth control (duration): Yes OCP (estrogen/progesterone). started at age 15 and stopped at age 18 due to blood clot  Pregnancy History:   Age at first live birth: 18  Hormone Replacement Therapy: No, none  Patient did not breast feed.  Patient denies nipple discharge.   Patient denies to previous breast biopsy.   Patient denies to a personal history of breast cancer.           Other Relevant History:  Prior thoracic RT: none  Genetic testing: No  Ashkenazi Hindu descent: No     Family History:  Family History   Problem Relation Name Age of Onset    Cervical cancer Mother      Diabetes Mother      Hypertension Mother      Prostate cancer Father  79    Prostate cancer Brother  68    Hypertension Brother      Brain cancer Child  6    Cancer Maternal Uncle      Cancer Maternal Uncle      Cancer Maternal Uncle          Past History:  Past Medical History:   Diagnosis Date    Breast cancer     Depression     History of blood clots     Hypertension     Lipoma of anterior chest wall         Past Surgical History:   Procedure Laterality Date    APPENDECTOMY      CHOLECYSTECTOMY      RENETTA FILTER PLACEMENT          HYSTERECTOMY      KNEE SURGERY Left     MASTECTOMY, PARTIAL Left 2024    Procedure: MASTECTOMY, PARTIAL - left  w/ radiological marker;  Surgeon: Deirdre Welch MD;  Location: [facility];  Service: General;  Laterality: Left;    NECK SURGERY      PLACEMENT, INFERIOR VENA CAVA FILTER Left     SHOULDER ARTHROSCOPY W/ ROTATOR CUFF REPAIR Left     STENT PLACEMENT/PRIOR TO CALIN Left         Social History     Socioeconomic History    Marital status:    Tobacco Use    Smoking status: Never    Smokeless tobacco: Never   Substance and Sexual Activity    Alcohol use: Never    Drug use: Never     Social Drivers of Health     Financial Resource Strain: Low Risk  (2023)    Received from  "[facility]    Overall Financial Resource Strain (CARDIA)     Difficulty of Paying Living Expenses: Not hard at all   Food Insecurity: No Food Insecurity (11/14/2023)    Received from [facility]    Hunger Vital Sign     Worried About Running Out of Food in the Last Year: Never true     Ran Out of Food in the Last Year: Never true        Body mass index is 36.77 kg/m².     Allergy/Medications:   Review of patient's allergies indicates:   Allergen Reactions    Amitriptyline Other (See Comments)     Other Reaction(s): muscle tightening    MUSCLE WEAKNESS    Wellbutrin [bupropion hcl] Hallucinations    Adhesive Rash    Anastrozole Nausea And Vomiting and Other (See Comments)     Dizziness    Enoxaparin Rash    Methocarbamol Rash and Other (See Comments)     MUSCLE WEAKNESS    Rivaroxaban Rash    Warfarin Rash          Current Outpatient Medications:     acetaminophen-codeine 300-30mg (TYLENOL #3) 300-30 mg Tab, Take 1 tablet by mouth., Disp: , Rfl:     amLODIPine (NORVASC) 10 MG tablet, Take 10 mg by mouth., Disp: , Rfl:     calcium carbonate (CALCIUM 500 ORAL), Take by mouth., Disp: , Rfl:     clopidogreL (PLAVIX) 75 mg tablet, , Disp: , Rfl:     ergocalciferol, vitamin D2, (VITAMIN D ORAL), Take by mouth., Disp: , Rfl:     furosemide (LASIX) 40 MG tablet, , Disp: , Rfl:     irbesartan (AVAPRO) 300 MG tablet, Take 300 mg by mouth., Disp: , Rfl:     rosuvastatin (CRESTOR) 10 MG tablet, Take 10 mg by mouth., Disp: , Rfl:        Review of Systems:  ROS        Objective:     Vitals:  Blood pressure 119/78, pulse 83, temperature 98.1 °F (36.7 °C), temperature source Oral, resp. rate 18, height 5' 7" (1.702 m), weight 106.5 kg (234 lb 12.8 oz), SpO2 96%.      Physical Exam:  General: The patient is awake, alert and oriented times three. The patient is well nourished and in no " acute distress.  Neck: There is no evidence of palpable cervical, supraclavicular or axillary adenopathy. The neck is supple. The thyroid is not enlarged.  Musculoskeletal: The patient has a normal range of motion of her bilateral upper extremities.  Chest: Examination of the chest wall fails to reveal any obvious abnormalities. Nonlabored breathing, symmetric expansion.  Breast:  Right:  Examination of right breast fails to reveal any dominant masses or areas of significant focal nodularity. The nipple is everted without evidence of discharge. There is no skin dimpling with movement of the pectoralis. There are no significant skin changes overlying the breast.   Left: Large firm lump at lumpectomy site in the UIQ consistent with post op scarring from breast hematoma. She has had no significant interval change here. US previously performed revealing small fluid collection.  Examination of the left breast fails to reveal any dominant masses or areas of significant focal nodularity. The nipple is everted without evidence of discharge. There is no skin dimpling with movement of the pectoralis. There are no significant skin changes overlying the breast.  Abdomen: The abdomen is soft, flat, nontender and nondistended.  Integumentary: no rashes or skin lesions present  Neurologic: cranial nerves intact, no signs of peripheral neurological deficit, motor/sensory function intact      Assessment and Plan:      Cancer Staging   Ductal carcinoma in situ (DCIS) of left breast  Staging form: Breast, AJCC 8th Edition  - Clinical stage from 7/9/2024: Stage 0 (cTis (DCIS), cN0, cM0, ER+, KS+, HER2: Not Assessed) - Signed by Deirdre Welch MD on 7/9/2024  - Pathologic stage from 7/30/2024: Stage 0 (pTis (DCIS), cN0, cM0, ER+, KS+, HER2: Not Assessed) - Signed by Deirdre Welch MD on 7/30/2024      Encounter Diagnoses   Name Primary?    Ductal carcinoma in situ (DCIS) of left breast Yes    Hematoma of left breast      Mastalgia             Plan:       Treatments options and supplements for breast pain:  Use hot or cold compresses on your breasts.  Wear a firm support bra, fitted by a professional if possible.  Wear a sports bra during exercise and while sleeping, especially when your breast may be more sensitive.  Limit or eliminate caffeine, a dietary change many women swear by.  Decrease the fat or dairy in your diet.  Use a pain reliever, such as acetaminophen (Tylenol)   Keep a journal noting when you experience breast pain and other symptoms, to determine if your pain is cyclic.  Vitamin E can help with breast pain (up to1,200 IU per day). Many women have found it to be helpful, but it usually takes up to 2 months of taking this to see a change.  Evening primrose oil; this supplement appears to change the balance of fatty acids in your cells, which may reduce breast pain. You can take a 1000 mg capsule up to three times a day.      Keep BL DG MG appt in April 2025.    Patient has discontinued adjuvant endocrine therapy due to side effects. Recommend follow up with medical oncology regarding this.    Healthy lifestyle guidelines were reviewed. She was encouraged to engage in regular exercise, maintain a healthy body weight, and avoid excessive alcohol consumption. Healthy nutritional guidelines were also discussed. Self-breast examination was reviewed with the patient in detail and she was encouraged to perform this on a monthly basis.       All of her questions were answered. She was advised to call if she develops any questions or concerns.    Daysi Naidu PA-C           Total time on the date of the visit ranged from 30-39 mins (29112). Total time includes both face-to-face and non-face-to-face time personally spent by myself on the day of the visit.    Non-face-to-face time included:  _X_ preparing to see the patient such as reviewing the patient record  __ obtaining and reviewing separately obtained history  _X_  independently interpreting results  _X_ documenting clinical information in electronic health record.    Face-to-face time included:  _X_ performing an appropriate history and examination  _X_ communicating results to the patient  _X_ counseling and educating the patient  __ ordering appropriate medications  _x_ ordering appropriate tests  _X_ ordering appropriate procedures (including follow-up)  _X_ answering any questions the patient had    Total Time spent on date of visit: 34 minutes

## 2025-04-01 ENCOUNTER — OFFICE VISIT (OUTPATIENT)
Dept: SURGERY | Facility: CLINIC | Age: 65
End: 2025-04-01
Payer: MEDICARE

## 2025-04-01 VITALS
SYSTOLIC BLOOD PRESSURE: 119 MMHG | WEIGHT: 234.81 LBS | BODY MASS INDEX: 36.85 KG/M2 | HEART RATE: 83 BPM | TEMPERATURE: 98 F | RESPIRATION RATE: 18 BRPM | OXYGEN SATURATION: 96 % | DIASTOLIC BLOOD PRESSURE: 78 MMHG | HEIGHT: 67 IN

## 2025-04-01 DIAGNOSIS — D05.12 DUCTAL CARCINOMA IN SITU (DCIS) OF LEFT BREAST: Primary | ICD-10-CM

## 2025-04-01 DIAGNOSIS — N64.4 MASTALGIA: ICD-10-CM

## 2025-04-01 DIAGNOSIS — N64.89 HEMATOMA OF LEFT BREAST: ICD-10-CM

## 2025-04-01 PROCEDURE — 99214 OFFICE O/P EST MOD 30 MIN: CPT | Mod: S$PBB,,, | Performed by: PHYSICIAN ASSISTANT

## 2025-04-01 PROCEDURE — 99999 PR PBB SHADOW E&M-EST. PATIENT-LVL IV: CPT | Mod: PBBFAC,,, | Performed by: PHYSICIAN ASSISTANT

## 2025-04-01 PROCEDURE — 99214 OFFICE O/P EST MOD 30 MIN: CPT | Mod: PBBFAC | Performed by: PHYSICIAN ASSISTANT

## 2025-04-01 NOTE — PATIENT INSTRUCTIONS
What is breast pain?  Breast pain (mastalgia) is a common type of discomfort among women, affecting as many as seven in 10 women at some point in their lives.  About 10 percent of women have moderate to severe breast pain more than five days a month. In some cases, severe breast pain lasts throughout the menstrual cycles.  The symptom occurs most frequently in young, premenopausal and perimenopausal women.  Breast pain alone rarely signifies breast cancer. Still, if you have unexplained breast pain that persists, get checked by your provider.     Causes of breast pain  Most of the time, it's not possible to identify the exact cause of breast pain. Likely contributors are hormonal changes, anatomical issues (breast trauma, prior breast surgery, or chest wall pain), breast size, and medication use (oral contraceptives, infertility treatments, estrogen/progesterone hormone therapy, and some antidepressants).     Treatments options and supplements for breast pain  Use hot or cold compresses on your breasts.  Wear a firm support bra, fitted by a professional if possible.  Wear a sports bra during exercise and while sleeping, especially when your breast may be more sensitive.  Limit or eliminate caffeine, a dietary change many women swear by.  Decrease the fat or dairy in your diet.  Use a pain reliever, such as acetaminophen (Tylenol) to alleviate breast pain.  Keep a journal noting when you experience breast pain and other symptoms, to determine if your pain is cyclic.  Vitamin E can help with breast pain (up to1,200 IU per day). Many women have found it to be helpful, but it usually takes up to 2 months of taking this to see a change.  Evening primrose oil; this supplement appears to change the balance of fatty acids in your cells, which may reduce breast pain. You can take a 1000 mg capsule up to three times a day.     Adapted from [email or web address]

## 2025-04-09 DIAGNOSIS — M25.562 KNEE PAIN, LEFT: Primary | ICD-10-CM

## 2025-04-16 ENCOUNTER — OFFICE VISIT (OUTPATIENT)
Dept: ORTHOPEDICS | Facility: CLINIC | Age: 65
End: 2025-04-16
Payer: MEDICARE

## 2025-04-16 VITALS
HEART RATE: 83 BPM | BODY MASS INDEX: 36.85 KG/M2 | WEIGHT: 234.81 LBS | SYSTOLIC BLOOD PRESSURE: 151 MMHG | DIASTOLIC BLOOD PRESSURE: 82 MMHG | HEIGHT: 67 IN

## 2025-04-16 DIAGNOSIS — M25.562 KNEE PAIN, LEFT: ICD-10-CM

## 2025-04-16 DIAGNOSIS — M17.32 POST-TRAUMATIC OSTEOARTHRITIS OF LEFT KNEE: ICD-10-CM

## 2025-04-16 DIAGNOSIS — M25.562 LEFT KNEE PAIN, UNSPECIFIED CHRONICITY: Primary | ICD-10-CM

## 2025-04-16 RX ORDER — METHYLPREDNISOLONE ACETATE 80 MG/ML
80 INJECTION, SUSPENSION INTRA-ARTICULAR; INTRALESIONAL; INTRAMUSCULAR; SOFT TISSUE
Status: DISCONTINUED | OUTPATIENT
Start: 2025-04-16 | End: 2025-04-16 | Stop reason: HOSPADM

## 2025-04-16 RX ORDER — LIDOCAINE HYDROCHLORIDE 20 MG/ML
2 INJECTION, SOLUTION INFILTRATION; PERINEURAL
Status: DISCONTINUED | OUTPATIENT
Start: 2025-04-16 | End: 2025-04-16 | Stop reason: HOSPADM

## 2025-04-16 RX ADMIN — METHYLPREDNISOLONE ACETATE 80 MG: 80 INJECTION, SUSPENSION INTRA-ARTICULAR; INTRALESIONAL; INTRAMUSCULAR; SOFT TISSUE at 10:04

## 2025-04-16 RX ADMIN — LIDOCAINE HYDROCHLORIDE 2 MG: 20 INJECTION, SOLUTION INFILTRATION; PERINEURAL at 10:04

## 2025-04-16 NOTE — PROGRESS NOTES
"Subjective:    CC: Pain of the Left Knee (L knee pain. Pt states she's been having trouble with it since she was 24. Had meniscus repaired and knee gets worse the older she gets. Tylenol doesn't help anymore. Has constant pain. Swells up every now and then. No numbness or tingling. Doesn't give out when she walks. No other concerns with it.)       HPI:  Patient comes in today complaining of left knee pain.  Patient states he has been having knee pain for the last 40 years.  She has had a previous knee scope in the past.  Outside facility.  She continues to have constant pain, swelling every now and then.  She has tried conservative treatment without relief.    ROS: Refer to HPI for pertinent ROS. All other 12 point systems negative.    Objective:  Vitals:    04/16/25 1001   BP: (!) 151/82   BP Location: Right arm   Patient Position: Sitting   Pulse: 83   Weight: 106.5 kg (234 lb 12.6 oz)   Height: 5' 7" (1.702 m)        Physical Exam:  The patient is well-nourished, well-developed and in no apparent distress, pleasant and cooperative. Examination of the left lower extremity compartments are soft and warm. Skin is intact. There are no signs or symptoms of DVT or infection. There is a small joint effusion. There is no erythema. Tender to palpation along the medial and lateral joint line , left knee range of motion is 5-95. The knee is stable to exam with varus and valgus stressing. Negative anterior and posterior drawer. Negative Lachman´s.  Negative Brea's test. Patella grind is positive, Negative for apprehension. Neurovascularly intact distally.    Images:  X-rays three views left knee demonstrates tricompartmental osteoarthritis. Images Reviewed and discussed with patient.    Assessment:  1. Left knee pain, unspecified chronicity  - X-Ray Knee 3 View Left; Future    2. Knee pain, left  - Ambulatory referral/consult to Orthopedics    3. Post-traumatic osteoarthritis of left knee  - Large Joint " Aspiration/Injection: L knee        Plan:  At this time we discussed her physical exam and x-ray findings.  We have discussed her significant arthritis.  We have discussed conservative and surgical intervention including a total knee arthroplasty.  In the meantime we have discussed some low-impact activities, knee exercises.  She tolerated the steroid injection very well through the anterolateral portal of the left knee.  I would like see back in 6 weeks to see how she is progressing.    Follow UP: No follow-ups on file.    Large Joint Aspiration/Injection: L knee    Date/Time: 4/16/2025 10:00 AM    Performed by: Kee Mckeon MD  Authorized by: Kee Mckeon MD    Consent Done?:  Yes (Verbal)  Indications:  Pain  Site marked: the procedure site was marked    Prep: patient was prepped and draped in usual sterile fashion    Approach:  Anterolateral  Location:  Knee  Site:  L knee  Medications:  2 mg LIDOcaine HCL 20 mg/ml (2%) 20 mg/mL (2 %); 80 mg methylPREDNISolone acetate 80 mg/mL  Patient tolerance:  Patient tolerated the procedure well with no immediate complications

## 2025-04-16 NOTE — PROCEDURES
Large Joint Aspiration/Injection: L knee    Date/Time: 4/16/2025 10:00 AM    Performed by: Kee Mckeon MD  Authorized by: Kee Mckeon MD    Consent Done?:  Yes (Verbal)  Indications:  Pain  Site marked: the procedure site was marked    Prep: patient was prepped and draped in usual sterile fashion    Approach:  Anterolateral  Location:  Knee  Site:  L knee  Medications:  2 mg LIDOcaine HCL 20 mg/ml (2%) 20 mg/mL (2 %); 80 mg methylPREDNISolone acetate 80 mg/mL  Patient tolerance:  Patient tolerated the procedure well with no immediate complications

## 2025-04-28 ENCOUNTER — HOSPITAL ENCOUNTER (OUTPATIENT)
Dept: RADIOLOGY | Facility: HOSPITAL | Age: 65
Discharge: HOME | End: 2025-04-28
Attending: SURGERY
Payer: MEDICARE

## 2025-04-28 DIAGNOSIS — Z85.3 PERSONAL HISTORY OF BREAST CANCER: ICD-10-CM

## 2025-04-28 PROCEDURE — 77066 DX MAMMO INCL CAD BI: CPT | Mod: TC

## 2025-04-28 PROCEDURE — 77066 DX MAMMO INCL CAD BI: CPT | Mod: 26,,, | Performed by: RADIOLOGY

## 2025-04-28 PROCEDURE — 77062 BREAST TOMOSYNTHESIS BI: CPT | Mod: 26,,, | Performed by: RADIOLOGY

## 2025-05-01 ENCOUNTER — RESULTS FOLLOW-UP (OUTPATIENT)
Dept: SURGERY | Facility: CLINIC | Age: 65
End: 2025-05-01

## 2025-05-06 ENCOUNTER — OFFICE VISIT (OUTPATIENT)
Dept: SURGERY | Facility: CLINIC | Age: 65
End: 2025-05-06
Payer: MEDICARE

## 2025-05-06 VITALS
TEMPERATURE: 98 F | DIASTOLIC BLOOD PRESSURE: 69 MMHG | SYSTOLIC BLOOD PRESSURE: 112 MMHG | WEIGHT: 231.38 LBS | HEIGHT: 67 IN | OXYGEN SATURATION: 98 % | RESPIRATION RATE: 18 BRPM | BODY MASS INDEX: 36.31 KG/M2 | HEART RATE: 90 BPM

## 2025-05-06 DIAGNOSIS — N64.4 MASTALGIA: ICD-10-CM

## 2025-05-06 DIAGNOSIS — Z98.890 S/P LUMPECTOMY, LEFT BREAST: ICD-10-CM

## 2025-05-06 DIAGNOSIS — N64.89 HEMATOMA OF LEFT BREAST: ICD-10-CM

## 2025-05-06 DIAGNOSIS — D05.12 DUCTAL CARCINOMA IN SITU (DCIS) OF LEFT BREAST: ICD-10-CM

## 2025-05-06 DIAGNOSIS — Z12.31 SCREENING MAMMOGRAM, ENCOUNTER FOR: Primary | ICD-10-CM

## 2025-05-06 PROCEDURE — 99999 PR PBB SHADOW E&M-EST. PATIENT-LVL IV: CPT | Mod: PBBFAC,,, | Performed by: PHYSICIAN ASSISTANT

## 2025-05-06 PROCEDURE — 99214 OFFICE O/P EST MOD 30 MIN: CPT | Mod: S$PBB,,, | Performed by: PHYSICIAN ASSISTANT

## 2025-05-06 PROCEDURE — 99214 OFFICE O/P EST MOD 30 MIN: CPT | Mod: PBBFAC | Performed by: PHYSICIAN ASSISTANT

## 2025-05-06 RX ORDER — DICLOFENAC SODIUM 10 MG/G
GEL TOPICAL DAILY
COMMUNITY
Start: 2025-04-08

## 2025-05-06 RX ORDER — ACETAMINOPHEN AND CODEINE PHOSPHATE 300; 60 MG/1; MG/1
1 TABLET ORAL
COMMUNITY
Start: 2025-02-25

## 2025-05-06 NOTE — PROGRESS NOTES
[facility] Breast Surg  Breast Surgical Oncology  Follow-Up Patient Office Visit       Referring Provider: Antoinette Elizondo   PCP: Antoinette Elizondo NP   Medical Oncologist: Dr. Leah Rivera  Radiation Oncologist: Dr. Addie Jimenez  Other Care Providers:     Chief Complaint:   Chief Complaint   Patient presents with    Follow-up     Patient denies any breast related concerns today       Subjective:   Treatment History:  7/22/2024 Left Lumpectomy  10/2024 [facility] Cancer[facility]-Expanded analysis - negative  Adjuvant radiation completed on 10/1/24.   Adjuvant Arimidex started 10/2024, but recently stopped s/t hallucinations. Started Letrozole 11/2024 but discontinued shortly after s/t intolerance. Declined further endocrine therapy.    Interval History:  5/6/2025 - Barbara Alvarez presents today for f/u after MG, which was benign. She has no new breast concerns. States that sometimes her scars and her hematoma are sometimes sore. Has no palpable changes though. Here today for CBE.    HPI:  Barbara Alvarez is a 65 y.o. female who presents on 7/9/2024 for evaluation of newly diagnosed left breast cancer. Biopsy of left breast mass positive for DCIS     A detailed patient history was obtained and reviewed. She currently denies any breast issues including rashes, redness, pain, swelling, nipple discharge, or new lumps/masses.     Of note, pt developed a blood clot after her first pregnancy and was taken off birth control medications. She had an IVC filter placed and is currently taking Plavix. She see Antoinette Elizondo NP for management of her Plavix.     MG breast density: Category B (scattered areas of fibroglandular tissue)      Imaging:  Bilateral screening mammogram 4/29/2024:   IMPRESSION: INCOMPLETE: NEEDS ADDITIONAL IMAGING EVALUATION  The oval mass in the 12:00 left breast needs further evaluation.       Left diagnostic mammogram 6/10/2024:   IMPRESSION: SUSPICIOUS OF MALIGNANCY  Left  breast 11:00 10 cm from the nipple posterior depth 0.7 cm irregular hypoechoic mass with angular/spiculated margins is suspicious for malignancy. BIRADS 4  Left breast US guided biopsy 7/1/2024:   IMPRESSION: ULTRASOUND GUIDED BIOPSY MALIGNANT   Ultrasound guided biopsy of the 0.7 cm mass in the left breast at 11:00 posterior depth 10 cm from the nipple with placement of a HydroMARK T3 coil-shaped clip was successful with no apparent post procedure complications.    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  Pathology results are concordant with mammography and ultrasound findings.       Left breast DG MG and US 1/16/2025 to evaluate painful area of palpable concern at the surgical site, increasing in size.   Probable evolving hematoma versus complex seroma in the superior left breast, accounting for the area of patient concern. (Located at at 11:00 10 cmfn, measures 6.5 cm on US)  No radiographic evidence of left breast malignancy.      BL DG MG at [facility] 4/28/2025 for routine post op f/u.  1.  Post lumpectomy and post radiation changes of the left breast, including a post operative seroma/hematoma within the left breast 11:00 posterior depth lumpectomy bed.  2.  No mammographic evidence of malignancy in either breast.     Pathology:  6/20/2024  Left breast mass at 11:00 10 CMFN, US biopsy: DCIS, G2, predominately cribriform and papillary types, 0.4 cm in greatest dimension. ER 90-95%, RI 70-75%    7/22/2024  Left Lumpectomy  Ductal carcinoma in situ with prominent apocrine features, grade 2-3. Cribriform and papillary patterns; no comedo necrosis identified; calcium phosphate focal microcalcifications present; biopsy site changes associated with ductal carcinoma in situ.  All margins clear.     OB/GYN History:  Age at Menarche Onset: 11  Menopausal Status: postmenopausal, LMP: at age 49  Hysterectomy/Oophorectomy: hysterectomy without BSO,  at age 22  Hormonal birth control (duration): Yes OCP (estrogen/progesterone). started at age 15 and stopped at age 18 due to blood clot  Pregnancy History:   Age at first live birth: 18  Hormone Replacement Therapy: No, none  Patient did not breast feed.  Patient denies nipple discharge.   Patient denies to previous breast biopsy.   Patient denies to a personal history of breast cancer.           Other Relevant History:  Prior thoracic RT: none  Genetic testing: No  Ashkenazi Worship descent: No     Family History:  Family History   Problem Relation Name Age of Onset    Cervical cancer Mother      Diabetes Mother      Hypertension Mother      Prostate cancer Father  79    Prostate cancer Brother  68    Hypertension Brother      Brain cancer Child  6    Cancer Maternal Uncle      Cancer Maternal Uncle      Cancer Maternal Uncle          Past History:  Past Medical History:   Diagnosis Date    Breast cancer     Depression     History of blood clots     Hypertension     Lipoma of anterior chest wall         Past Surgical History:   Procedure Laterality Date    APPENDECTOMY      CHOLECYSTECTOMY      RENETTA FILTER PLACEMENT          HYSTERECTOMY      KNEE SURGERY Left     MASTECTOMY, PARTIAL Left 2024    Procedure: MASTECTOMY, PARTIAL - left  w/ radiological marker;  Surgeon: Deirdre Welch MD;  Location: [facility];  Service: General;  Laterality: Left;    NECK SURGERY      PLACEMENT, INFERIOR VENA CAVA FILTER Left     SHOULDER ARTHROSCOPY W/ ROTATOR CUFF REPAIR Left     STENT PLACEMENT/PRIOR TO CALIN Left         Social History     Socioeconomic History    Marital status:    Tobacco Use    Smoking status: Never    Smokeless tobacco: Never   Substance and Sexual Activity    Alcohol use: Never    Drug use: Never     Social Drivers of Health     Financial Resource Strain: Low Risk  (2023)    Received from [facility] Subsidiaries and Affiliates  "   Overall Financial Resource Strain (CARDIA)     Difficulty of Paying Living Expenses: Not hard at all   Food Insecurity: No Food Insecurity (11/14/2023)    Received from [facility] Subsidiaries and Affiliates    Hunger Vital Sign     Worried About Running Out of Food in the Last Year: Never true     Ran Out of Food in the Last Year: Never true        Body mass index is 36.24 kg/m².     Allergy/Medications:   Review of patient's allergies indicates:   Allergen Reactions    Amitriptyline Other (See Comments)     Other Reaction(s): muscle tightening    MUSCLE WEAKNESS    Wellbutrin [bupropion hcl] Hallucinations    Adhesive Rash    Anastrozole Nausea And Vomiting and Other (See Comments)     Dizziness    Enoxaparin Rash    Methocarbamol Rash and Other (See Comments)     MUSCLE WEAKNESS    Rivaroxaban Rash    Warfarin Rash          Current Outpatient Medications:     acetaminophen-codeine 300-60mg (TYLENOL #4) 300-60 mg Tab, Take 1 tablet by mouth., Disp: , Rfl:     amLODIPine (NORVASC) 10 MG tablet, Take 10 mg by mouth., Disp: , Rfl:     calcium carbonate (CALCIUM 500 ORAL), Take by mouth., Disp: , Rfl:     clopidogreL (PLAVIX) 75 mg tablet, , Disp: , Rfl:     diclofenac sodium (VOLTAREN) 1 % Gel, Apply topically once daily., Disp: , Rfl:     ergocalciferol, vitamin D2, (VITAMIN D ORAL), Take by mouth., Disp: , Rfl:     furosemide (LASIX) 40 MG tablet, , Disp: , Rfl:     irbesartan (AVAPRO) 300 MG tablet, Take 300 mg by mouth., Disp: , Rfl:     rosuvastatin (CRESTOR) 10 MG tablet, Take 10 mg by mouth., Disp: , Rfl:        Review of Systems:  ROS        Objective:     Vitals:  Blood pressure 112/69, pulse 90, temperature 98.4 °F (36.9 °C), temperature source Oral, resp. rate 18, height 5' 7" (1.702 m), weight 105 kg (231 lb 6.4 oz), SpO2 98%.      Physical Exam:  General: The patient is awake, alert and oriented times three. The patient is well nourished and in no acute " distress.  Neck: There is no evidence of palpable cervical, supraclavicular or axillary adenopathy. The neck is supple. The thyroid is not enlarged.  Musculoskeletal: The patient has a normal range of motion of her bilateral upper extremities.  Chest: Examination of the chest wall fails to reveal any obvious abnormalities. Nonlabored breathing, symmetric expansion.  Breast:  Right:  Examination of right breast fails to reveal any dominant masses or areas of significant focal nodularity. The nipple is everted without evidence of discharge. There is no skin dimpling with movement of the pectoralis. There are no significant skin changes overlying the breast.   Left: Large firm lump at lumpectomy site in the UIQ consistent with post op scarring from breast hematoma. She has had no significant interval change here. US previously performed revealing small fluid collection.  Examination of the left breast fails to reveal any dominant masses or areas of significant focal nodularity. The nipple is everted without evidence of discharge. There is no skin dimpling with movement of the pectoralis. There are no significant skin changes overlying the breast.  Abdomen: The abdomen is soft, flat, nontender and nondistended.  Integumentary: no rashes or skin lesions present  Neurologic: cranial nerves intact, no signs of peripheral neurological deficit, motor/sensory function intact      Assessment and Plan:      Cancer Staging   Ductal carcinoma in situ (DCIS) of left breast  Staging form: Breast, AJCC 8th Edition  - Clinical stage from 7/9/2024: Stage 0 (cTis (DCIS), cN0, cM0, ER+, WA+, HER2: Not Assessed) - Signed by Deirdre Welch MD on 7/9/2024  - Pathologic stage from 7/30/2024: Stage 0 (pTis (DCIS), cN0, cM0, ER+, WA+, HER2: Not Assessed) - Signed by Deirdre Welch MD on 7/30/2024      Encounter Diagnoses   Name Primary?    Screening mammogram, encounter for Yes        Plan:       Treatments options and supplements for  breast pain:  Use hot or cold compresses on your breasts.  Wear a firm support bra, fitted by a professional if possible.  Wear a sports bra during exercise and while sleeping, especially when your breast may be more sensitive.  Limit or eliminate caffeine, a dietary change many women swear by.  Decrease the fat or dairy in your diet.  Use a pain reliever, such as acetaminophen (Tylenol)   Keep a journal noting when you experience breast pain and other symptoms, to determine if your pain is cyclic.  Vitamin E can help with breast pain (up to1,200 IU per day). Many women have found it to be helpful, but it usually takes up to 2 months of taking this to see a change.  Evening primrose oil; this supplement appears to change the balance of fatty acids in your cells, which may reduce breast pain. You can take a 1000 mg capsule up to three times a day.      SCR MG due April 2026.    RTC in 6 mo for CBE.    Patient has discontinued adjuvant endocrine therapy due to side effects. Recommend follow up with medical oncology regarding this.    Healthy lifestyle guidelines were reviewed. She was encouraged to engage in regular exercise, maintain a healthy body weight, and avoid excessive alcohol consumption. Healthy nutritional guidelines were also discussed. Self-breast examination was reviewed with the patient in detail and she was encouraged to perform this on a monthly basis.       All of her questions were answered. She was advised to call if she develops any questions or concerns.    Daysi Naidu PA-C           Total time on the date of the visit ranged from 30-39 mins (30707). Total time includes both face-to-face and non-face-to-face time personally spent by myself on the day of the visit.    Non-face-to-face time included:  _X_ preparing to see the patient such as reviewing the patient record  __ obtaining and reviewing separately obtained history  _X_ independently interpreting results  _X_ documenting clinical  information in electronic health record.    Face-to-face time included:  _X_ performing an appropriate history and examination  _X_ communicating results to the patient  _X_ counseling and educating the patient  __ ordering appropriate medications  _x_ ordering appropriate tests  _X_ ordering appropriate procedures (including follow-up)  _X_ answering any questions the patient had    Total Time spent on date of visit: 34 minutes

## 2025-05-08 ENCOUNTER — TELEPHONE (OUTPATIENT)
Dept: ORTHOPEDICS | Facility: CLINIC | Age: 65
End: 2025-05-08
Payer: MEDICARE

## 2025-05-08 NOTE — TELEPHONE ENCOUNTER
Patient called wanting to know if she could come in on Monday to discuss TKA. She had an inj 4/16/25 states the shot hasn't worked at all. She would like to have sx the last week of May or first week of June due to being off for the summer. Patient drives a school bus. I placed on the schedule in an opening you had on Monday morning at 10:30.

## 2025-05-12 ENCOUNTER — OFFICE VISIT (OUTPATIENT)
Dept: ORTHOPEDICS | Facility: CLINIC | Age: 65
End: 2025-05-12
Payer: MEDICARE

## 2025-05-12 ENCOUNTER — HOSPITAL ENCOUNTER (OUTPATIENT)
Dept: RADIOLOGY | Facility: CLINIC | Age: 65
Discharge: HOME | End: 2025-05-12
Payer: MEDICARE

## 2025-05-12 VITALS — HEIGHT: 67 IN | WEIGHT: 234 LBS | BODY MASS INDEX: 36.73 KG/M2

## 2025-05-12 DIAGNOSIS — Z01.818 PRE-OP EXAM: ICD-10-CM

## 2025-05-12 DIAGNOSIS — R79.9 ABNORMAL FINDING OF BLOOD CHEMISTRY, UNSPECIFIED: ICD-10-CM

## 2025-05-12 DIAGNOSIS — M17.12 OSTEOARTHRITIS OF LEFT KNEE: ICD-10-CM

## 2025-05-12 DIAGNOSIS — Z01.818 PRE-OP EXAM: Primary | ICD-10-CM

## 2025-05-12 DIAGNOSIS — Z86.718 HX OF BLOOD CLOTS: ICD-10-CM

## 2025-05-12 DIAGNOSIS — M17.12 OSTEOARTHRITIS OF LEFT KNEE, UNSPECIFIED OSTEOARTHRITIS TYPE: ICD-10-CM

## 2025-05-12 PROCEDURE — 73564 X-RAY EXAM KNEE 4 OR MORE: CPT | Mod: LT,,,

## 2025-05-12 PROCEDURE — 99214 OFFICE O/P EST MOD 30 MIN: CPT | Mod: ,,,

## 2025-05-12 RX ORDER — KETOROLAC TROMETHAMINE 10 MG/1
10 TABLET, FILM COATED ORAL
OUTPATIENT
Start: 2025-05-12 | End: 2025-05-12

## 2025-05-12 RX ORDER — ONDANSETRON 4 MG/1
4 TABLET, ORALLY DISINTEGRATING ORAL
OUTPATIENT
Start: 2025-05-12

## 2025-05-12 RX ORDER — SCOPOLAMINE 1 MG/3D
1 PATCH, EXTENDED RELEASE TRANSDERMAL ONCE AS NEEDED
OUTPATIENT
Start: 2025-05-12 | End: 2036-10-08

## 2025-05-12 RX ORDER — GABAPENTIN 100 MG/1
300 CAPSULE ORAL
OUTPATIENT
Start: 2025-05-12

## 2025-05-12 RX ORDER — ACETAMINOPHEN 500 MG
1000 TABLET ORAL
OUTPATIENT
Start: 2025-05-12

## 2025-05-12 RX ORDER — TAMSULOSIN HYDROCHLORIDE 0.4 MG/1
0.4 CAPSULE ORAL
OUTPATIENT
Start: 2025-05-12

## 2025-05-12 RX ORDER — SODIUM CHLORIDE, SODIUM GLUCONATE, SODIUM ACETATE, POTASSIUM CHLORIDE AND MAGNESIUM CHLORIDE 30; 37; 368; 526; 502 MG/100ML; MG/100ML; MG/100ML; MG/100ML; MG/100ML
INJECTION, SOLUTION INTRAVENOUS CONTINUOUS
OUTPATIENT
Start: 2025-05-12

## 2025-05-12 NOTE — LETTER
Surgery Clearance Request Form  Patients Name: Barbara Alvarez  : 1960  Today's Date: 2025     Dr. Elizondo ,        The above named patient is scheduled to have a left total knee replacement on 6/10/25.    Surgeon: Kee Mckeon MD  Type of Anesthesia: Spinal  Requesting Staff Name: Sangeeta Escoto LPN    This patient needs surgical clearance from your office for this procedure.    Please perform necessary tests in order for this patient to be medically cleared for surgery.     PLEASE FAX THE CLEARANCE LETTER WITH THE MOST RECENT DIAGNOSTICS AND PROGRESS NOTES TO US AT [phone].  PLEASE BE SURE TO INCLUDE ANY MEDICATION INSTRUCTIONS THAT SHOULD BE HELD PRIOR TO SURGERY.     CHECK ALL THAT APPLY AND COMPLETE THE BLANKS:   [] Request for cardiac risk assessment for procedure stated above.   (Please fax us the risk assessment/clearance letter with the most recent ECHO, EKG or stress test.)   [x] Medication instructions prior to surgery.   (Please specify if you recommend the patient stop any medications prior to surgery and how many days prior to surgery.)   [x] Request for primary care clearance for the procedure stated above.   (Please fax us the clearance letter with the most recent diagnostics and progress notes.)    We appreciate your help in getting our patient cleared for surgery.    Please feel free to call our office should you have any questions.     Thank you,   Kee Mckeon MD     Phone Number: [phone]  Fax Number: [phone]

## 2025-05-12 NOTE — H&P (VIEW-ONLY)
Admission History & Physical    Subjective:    CC: Pre-op Exam of the Left Knee (Pre-op L TKA. Steroid inj 4/16/25. No changes with knee. Inj helped but has worn off. Weight is up to date)       HPI:  Barbara Alvarez presents today for preoperative evaluation for right total knee arthroplasty with Contreras robotic assistance.  She has failed to improve despite conservative treatments including rest, activity modification, anti-inflammatories use limited to Tylenol given blood thinner, and steroid injections.  I reviewed the indications for surgery. The risks and benefits of the proposed and alternative treatments were discussed with the patient. Questions pertinent to the procedure were solicited and answered. Dr. Mckeon was available to answer any questions with instruction to call clinic with any further questions.No assurances were given. Informed consent was obtained. The patient expressed good understanding and wished to proceed with scheduling the procedure.     Of note, patient has a history of recurrent blood clots for which she is currently on Plavix as she has not been able to tolerate other blood thinners.  This led to her having an IVC filter placed as well.  She states she does tolerate aspirin if needed to be added.  She also had a left groin stent placed couple years ago by Dr. Omer.  She states she has some residual intermittent swelling in the left lower extremity secondary to this.  She denies any numbness or tingling today.  She also has a known history of breast cancer for which she states she has been in remission.  Not currently on any maintenance therapy      This patient has  Increased pain with activity Initiation  Interference with normal activities of daily living due to pain  Increased pain with weight bearing activities  Pain with range of motion    This patient has an active or unstable comorbidity that significantly increases the mortality risk and require more than 24 hours of postoperative  monitoring.    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 Case management will consulted to assist with discharge planning and placement options.        ROS:   Constitutional: No fever, weakness, or fatigue.   Ear/Nose/Mouth/Throat: No nasal congestion or sore throat.   Respiratory: No shortness of breath or cough.   Cardiovascular: No chest pain, palpitations, or peripheral edema.   Gastrointestinal: No nausea, vomiting, or abdominal pain.   Genitourinary: No dysuria.  Musculoskeletal:  Left knee pain, swelling, loss of motion.    Past Surgical History:   Procedure Laterality Date    APPENDECTOMY      CHOLECYSTECTOMY      RENETTA FILTER PLACEMENT      2015    HYSTERECTOMY      KNEE SURGERY Left     MASTECTOMY, PARTIAL Left 7/22/2024    Procedure: MASTECTOMY, PARTIAL - left  w/ radiological marker;  Surgeon: Deirdre Welch MD;  Location: [facility];  Service: General;  Laterality: Left;    NECK SURGERY      PLACEMENT, INFERIOR VENA CAVA FILTER Left     SHOULDER ARTHROSCOPY W/ ROTATOR CUFF REPAIR Left     STENT PLACEMENT/PRIOR TO CALIN Left 2015        Past Medical History:   Diagnosis Date    Breast cancer     Depression     History of blood clots     Hypertension     Lipoma of anterior chest wall         Objective:    There were no vitals filed for this visit.     Physical Exam:    Appearance: No distress, good color on room air. Alert and cooperative.  HEENT: Normocephalic. PERRLA EOM intact.   Lungs: Breathing unlabored.  Heart: Regular rate and rhythm.  Abdomen: Soft, non-tender.  No rebound tenderness.  Extremities: Examination of the left lower extremity compartments are soft and warm. Skin is intact. There are no signs or symptoms of DVT or infection. There is a small joint effusion. There is no erythema. Tender to palpation along the medial and  lateral joint line , left knee range of motion is 5-95. The knee is stable to exam with varus and valgus stressing. Negative anterior and posterior drawer. Negative Lachman´s. Negative Brea's test. Patella grind is positive, Negative for apprehension. Neurovascularly intact distally.   Skin: No rashes or open wounds.    X-rays:  Four views of the left knee demonstrate tricompartmental osteoarthritis.  Bone-on-bone of the medial compartment with bone spurring.  Kg grade 4.  No obvious fracture dislocation.    Assessment:  1. Pre-op exam  - X-Ray Knee Complete 4 or More Views Left; Future  - Vital signs; Standing  - Insert peripheral IV; Standing  - Clip and Prep Other (please specifiy) (Operative site); Standing  - Cleanse with Chlorhexidine (CHG); Standing  - Apply Nozin; Standing  - Diet NPO; Standing  - electrolyte-A infusion  - IP VTE LOW RISK PATIENT; Standing  - ceFAZolin (Ancef) 2 g in D5W 50 mL IVPB  - acetaminophen tablet 1,000 mg  - ketorolac tablet 10 mg  - gabapentin capsule 300 mg  - ondansetron disintegrating tablet 4 mg  - scopolamine 1.3-1.5 mg (1 mg over 3 days) 1 patch  - tamsulosin 24 hr capsule 0.4 mg  - POCT glucose; Standing  - MRSA PCR; Future  - CBC auto differential; Future  - Comprehensive metabolic panel; Future  - Urinalysis; Future  - Hemoglobin A1c; Future  - X-Ray Chest PA And Lateral; Future  - EKG 12-lead; Future  - Inpatient consult to Anesthesiology; Standing  - Case Request Operating Room: ROBOTIC ARTHROPLASTY, KNEE, TOTAL  - Admit to Inpatient; Standing  - Place EVELYN hose; Standing  - Place sequential compression device; Standing  - CT Knee w/o Contrast Left w/Contreras Protocol; Future    2. Osteoarthritis of left knee, unspecified osteoarthritis type  - Vital signs; Standing  - Insert peripheral IV; Standing  - Clip and Prep Other (please specifiy) (Operative site); Standing  - Cleanse with Chlorhexidine (CHG); Standing  - Apply Nozin; Standing  - Diet NPO; Standing  -  electrolyte-A infusion  - IP VTE LOW RISK PATIENT; Standing  - ceFAZolin (Ancef) 2 g in D5W 50 mL IVPB  - acetaminophen tablet 1,000 mg  - ketorolac tablet 10 mg  - gabapentin capsule 300 mg  - ondansetron disintegrating tablet 4 mg  - scopolamine 1.3-1.5 mg (1 mg over 3 days) 1 patch  - tamsulosin 24 hr capsule 0.4 mg  - POCT glucose; Standing  - MRSA PCR; Future  - CBC auto differential; Future  - Comprehensive metabolic panel; Future  - Urinalysis; Future  - Hemoglobin A1c; Future  - X-Ray Chest PA And Lateral; Future  - EKG 12-lead; Future  - Inpatient consult to Anesthesiology; Standing  - Case Request Operating Room: ROBOTIC ARTHROPLASTY, KNEE, TOTAL  - Admit to Inpatient; Standing  - Place EVELYN hose; Standing  - Place sequential compression device; Standing  - CT Knee w/o Contrast Left w/[facility] Protocol; Future    3. Hx of blood clots    4. Abnormal finding of blood chemistry, unspecified  - Hemoglobin A1c; Future    5. Osteoarthritis of left knee       Plan:  Plan for left total knee arthroplasty with Contreras robotic assistance at [facility]. The patient has been given preoperative instructions. Post-operative appointment is scheduled for 2 weeks.  Patient was given knee exercises for preop rehab.  Patient will need CT of the operative knee for preoperative surgical planning.  Given her history of blood clots we will plan to resume her Plavix and add 81 mg baby aspirin starting POD 1.  No TXA to be given preoperatively.  Strict precautions were given to notify clinic if any signs or symptoms of a DVT or PE develop.

## 2025-05-12 NOTE — H&P
Admission History & Physical    Subjective:    CC: Pre-op Exam of the Left Knee (Pre-op L TKA. Steroid inj 4/16/25. No changes with knee. Inj helped but has worn off. Weight is up to date)       HPI:  Barbara Alvarez presents today for preoperative evaluation for right total knee arthroplasty with Contreras robotic assistance.  She has failed to improve despite conservative treatments including rest, activity modification, anti-inflammatories use limited to Tylenol given blood thinner, and steroid injections.  I reviewed the indications for surgery. The risks and benefits of the proposed and alternative treatments were discussed with the patient. Questions pertinent to the procedure were solicited and answered. Dr. Mckeon was available to answer any questions with instruction to call clinic with any further questions.No assurances were given. Informed consent was obtained. The patient expressed good understanding and wished to proceed with scheduling the procedure.     Of note, patient has a history of recurrent blood clots for which she is currently on Plavix as she has not been able to tolerate other blood thinners.  This led to her having an IVC filter placed as well.  She states she does tolerate aspirin if needed to be added.  She also had a left groin stent placed couple years ago by Dr. Omer.  She states she has some residual intermittent swelling in the left lower extremity secondary to this.  She denies any numbness or tingling today.  She also has a known history of breast cancer for which she states she has been in remission.  Not currently on any maintenance therapy      This patient has  Increased pain with activity Initiation  Interference with normal activities of daily living due to pain  Increased pain with weight bearing activities  Pain with range of motion    This patient has an active or unstable comorbidity that significantly increases the mortality risk and require more than 24 hours of postoperative  monitoring.    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 Case management will consulted to assist with discharge planning and placement options.        ROS:   Constitutional: No fever, weakness, or fatigue.   Ear/Nose/Mouth/Throat: No nasal congestion or sore throat.   Respiratory: No shortness of breath or cough.   Cardiovascular: No chest pain, palpitations, or peripheral edema.   Gastrointestinal: No nausea, vomiting, or abdominal pain.   Genitourinary: No dysuria.  Musculoskeletal:  Left knee pain, swelling, loss of motion.    Past Surgical History:   Procedure Laterality Date    APPENDECTOMY      CHOLECYSTECTOMY      RENETTA FILTER PLACEMENT      2015    HYSTERECTOMY      KNEE SURGERY Left     MASTECTOMY, PARTIAL Left 7/22/2024    Procedure: MASTECTOMY, PARTIAL - left  w/ radiological marker;  Surgeon: Deirdre Welch MD;  Location: [facility];  Service: General;  Laterality: Left;    NECK SURGERY      PLACEMENT, INFERIOR VENA CAVA FILTER Left     SHOULDER ARTHROSCOPY W/ ROTATOR CUFF REPAIR Left     STENT PLACEMENT/PRIOR TO CALIN Left 2015        Past Medical History:   Diagnosis Date    Breast cancer     Depression     History of blood clots     Hypertension     Lipoma of anterior chest wall         Objective:    There were no vitals filed for this visit.     Physical Exam:    Appearance: No distress, good color on room air. Alert and cooperative.  HEENT: Normocephalic. PERRLA EOM intact.   Lungs: Breathing unlabored.  Heart: Regular rate and rhythm.  Abdomen: Soft, non-tender.  No rebound tenderness.  Extremities: Examination of the left lower extremity compartments are soft and warm. Skin is intact. There are no signs or symptoms of DVT or infection. There is a small joint effusion. There is no erythema. Tender to palpation along the medial and  lateral joint line , left knee range of motion is 5-95. The knee is stable to exam with varus and valgus stressing. Negative anterior and posterior drawer. Negative Lachman´s. Negative Brea's test. Patella grind is positive, Negative for apprehension. Neurovascularly intact distally.   Skin: No rashes or open wounds.    X-rays:  Four views of the left knee demonstrate tricompartmental osteoarthritis.  Bone-on-bone of the medial compartment with bone spurring.  Kg grade 4.  No obvious fracture dislocation.    Assessment:  1. Pre-op exam  - X-Ray Knee Complete 4 or More Views Left; Future  - Vital signs; Standing  - Insert peripheral IV; Standing  - Clip and Prep Other (please specifiy) (Operative site); Standing  - Cleanse with Chlorhexidine (CHG); Standing  - Apply Nozin; Standing  - Diet NPO; Standing  - electrolyte-A infusion  - IP VTE LOW RISK PATIENT; Standing  - ceFAZolin (Ancef) 2 g in D5W 50 mL IVPB  - acetaminophen tablet 1,000 mg  - ketorolac tablet 10 mg  - gabapentin capsule 300 mg  - ondansetron disintegrating tablet 4 mg  - scopolamine 1.3-1.5 mg (1 mg over 3 days) 1 patch  - tamsulosin 24 hr capsule 0.4 mg  - POCT glucose; Standing  - MRSA PCR; Future  - CBC auto differential; Future  - Comprehensive metabolic panel; Future  - Urinalysis; Future  - Hemoglobin A1c; Future  - X-Ray Chest PA And Lateral; Future  - EKG 12-lead; Future  - Inpatient consult to Anesthesiology; Standing  - Case Request Operating Room: ROBOTIC ARTHROPLASTY, KNEE, TOTAL  - Admit to Inpatient; Standing  - Place EVELYN hose; Standing  - Place sequential compression device; Standing  - CT Knee w/o Contrast Left w/Contreras Protocol; Future    2. Osteoarthritis of left knee, unspecified osteoarthritis type  - Vital signs; Standing  - Insert peripheral IV; Standing  - Clip and Prep Other (please specifiy) (Operative site); Standing  - Cleanse with Chlorhexidine (CHG); Standing  - Apply Nozin; Standing  - Diet NPO; Standing  -  electrolyte-A infusion  - IP VTE LOW RISK PATIENT; Standing  - ceFAZolin (Ancef) 2 g in D5W 50 mL IVPB  - acetaminophen tablet 1,000 mg  - ketorolac tablet 10 mg  - gabapentin capsule 300 mg  - ondansetron disintegrating tablet 4 mg  - scopolamine 1.3-1.5 mg (1 mg over 3 days) 1 patch  - tamsulosin 24 hr capsule 0.4 mg  - POCT glucose; Standing  - MRSA PCR; Future  - CBC auto differential; Future  - Comprehensive metabolic panel; Future  - Urinalysis; Future  - Hemoglobin A1c; Future  - X-Ray Chest PA And Lateral; Future  - EKG 12-lead; Future  - Inpatient consult to Anesthesiology; Standing  - Case Request Operating Room: ROBOTIC ARTHROPLASTY, KNEE, TOTAL  - Admit to Inpatient; Standing  - Place EVELYN hose; Standing  - Place sequential compression device; Standing  - CT Knee w/o Contrast Left w/[facility] Protocol; Future    3. Hx of blood clots    4. Abnormal finding of blood chemistry, unspecified  - Hemoglobin A1c; Future    5. Osteoarthritis of left knee       Plan:  Plan for left total knee arthroplasty with Contreras robotic assistance at [facility]. The patient has been given preoperative instructions. Post-operative appointment is scheduled for 2 weeks.  Patient was given knee exercises for preop rehab.  Patient will need CT of the operative knee for preoperative surgical planning.  Given her history of blood clots we will plan to resume her Plavix and add 81 mg baby aspirin starting POD 1.  No TXA to be given preoperatively.  Strict precautions were given to notify clinic if any signs or symptoms of a DVT or PE develop.

## 2025-05-12 NOTE — LETTER
Surgery Clearance Request Form  Patients Name: Barbara Alvarez  : 1960  Today's Date: 2025     Dr. Zambrano ,        The above named patient is scheduled to have a left total knee replacement on 6/10/25.    Surgeon: Kee Mckeon MD  Type of Anesthesia: Spinal  Requesting Staff Name: Sangeeta Escoto LPN    This patient needs surgical clearance from your office for this procedure.    Please perform necessary tests in order for this patient to be medically cleared for surgery.     PLEASE FAX THE CLEARANCE LETTER WITH THE MOST RECENT DIAGNOSTICS AND PROGRESS NOTES TO US AT [phone].  PLEASE BE SURE TO INCLUDE ANY MEDICATION INSTRUCTIONS THAT SHOULD BE HELD PRIOR TO SURGERY.     CHECK ALL THAT APPLY AND COMPLETE THE BLANKS:   [x] Request for cardiac risk assessment for procedure stated above.   (Please fax us the risk assessment/clearance letter with the most recent ECHO, EKG or stress test.)   [x] Medication instructions prior to surgery.   (Please specify if you recommend the patient stop any medications prior to surgery and how many days prior to surgery.)   [] Request for primary care clearance for the procedure stated above.   (Please fax us the clearance letter with the most recent diagnostics and progress notes.)    We appreciate your help in getting our patient cleared for surgery.    Please feel free to call our office should you have any questions.     Thank you,   Kee Mckeon MD     Phone Number: [phone]  Fax Number: [phone]

## 2025-05-15 ENCOUNTER — HOSPITAL ENCOUNTER (OUTPATIENT)
Dept: RADIOLOGY | Facility: HOSPITAL | Age: 65
Discharge: HOME | End: 2025-05-15
Payer: MEDICARE

## 2025-05-15 DIAGNOSIS — Z01.818 PRE-OP EXAM: ICD-10-CM

## 2025-05-15 DIAGNOSIS — M17.12 OSTEOARTHRITIS OF LEFT KNEE, UNSPECIFIED OSTEOARTHRITIS TYPE: ICD-10-CM

## 2025-05-15 PROCEDURE — 71046 X-RAY EXAM CHEST 2 VIEWS: CPT | Mod: TC

## 2025-05-15 PROCEDURE — 73700 CT LOWER EXTREMITY W/O DYE: CPT | Mod: TC,LT

## 2025-05-15 PROCEDURE — 93041 RHYTHM ECG TRACING: CPT

## 2025-05-16 ENCOUNTER — LAB VISIT (OUTPATIENT)
Dept: LAB | Facility: HOSPITAL | Age: 65
End: 2025-05-16
Attending: NURSE PRACTITIONER
Payer: MEDICARE

## 2025-05-16 DIAGNOSIS — D05.12 DUCTAL CARCINOMA IN SITU (DCIS) OF LEFT BREAST: ICD-10-CM

## 2025-05-16 DIAGNOSIS — M85.851 OSTEOPENIA OF BOTH HIPS: ICD-10-CM

## 2025-05-16 DIAGNOSIS — E66.01 SEVERE OBESITY (BMI 35.0-39.9) WITH COMORBIDITY: ICD-10-CM

## 2025-05-16 DIAGNOSIS — M85.852 OSTEOPENIA OF BOTH HIPS: ICD-10-CM

## 2025-05-16 LAB
ALBUMIN SERPL-MCNC: 3.8 G/DL (ref 3.4–4.8)
ALBUMIN/GLOB SERPL: 1 RATIO (ref 1.1–2)
ALP SERPL-CCNC: 115 UNIT/L (ref 40–150)
ALT SERPL-CCNC: 11 UNIT/L (ref 0–55)
ANION GAP SERPL CALC-SCNC: 8 MEQ/L
AST SERPL-CCNC: 14 UNIT/L (ref 11–45)
BASOPHILS # BLD AUTO: 0.04 X10(3)/MCL
BASOPHILS NFR BLD AUTO: 0.5 %
BILIRUB SERPL-MCNC: 0.7 MG/DL
BUN SERPL-MCNC: 18.7 MG/DL (ref 9.8–20.1)
CALCIUM SERPL-MCNC: 9.6 MG/DL (ref 8.4–10.2)
CHLORIDE SERPL-SCNC: 107 MMOL/L (ref 98–107)
CO2 SERPL-SCNC: 25 MMOL/L (ref 23–31)
CREAT SERPL-MCNC: 0.88 MG/DL (ref 0.55–1.02)
CREAT/UREA NIT SERPL: 21
EOSINOPHIL # BLD AUTO: 0.18 X10(3)/MCL (ref 0–0.9)
EOSINOPHIL NFR BLD AUTO: 2.3 %
ERYTHROCYTE [DISTWIDTH] IN BLOOD BY AUTOMATED COUNT: 12.4 % (ref 11.5–17)
GFR SERPLBLD CREATININE-BSD FMLA CKD-EPI: >60 ML/MIN/1.73/M2
GLOBULIN SER-MCNC: 3.9 GM/DL (ref 2.4–3.5)
GLUCOSE SERPL-MCNC: 134 MG/DL (ref 82–115)
HCT VFR BLD AUTO: 37.3 % (ref 37–47)
HGB BLD-MCNC: 11.4 G/DL (ref 12–16)
IMM GRANULOCYTES # BLD AUTO: 0.01 X10(3)/MCL (ref 0–0.04)
IMM GRANULOCYTES NFR BLD AUTO: 0.1 %
LYMPHOCYTES # BLD AUTO: 1.68 X10(3)/MCL (ref 0.6–4.6)
LYMPHOCYTES NFR BLD AUTO: 21.4 %
MCH RBC QN AUTO: 27.7 PG (ref 27–31)
MCHC RBC AUTO-ENTMCNC: 30.6 G/DL (ref 33–36)
MCV RBC AUTO: 90.5 FL (ref 80–94)
MONOCYTES # BLD AUTO: 0.4 X10(3)/MCL (ref 0.1–1.3)
MONOCYTES NFR BLD AUTO: 5.1 %
NEUTROPHILS # BLD AUTO: 5.54 X10(3)/MCL (ref 2.1–9.2)
NEUTROPHILS NFR BLD AUTO: 70.6 %
PLATELET # BLD AUTO: 401 X10(3)/MCL (ref 130–400)
PMV BLD AUTO: 9.1 FL (ref 7.4–10.4)
POTASSIUM SERPL-SCNC: 4 MMOL/L (ref 3.5–5.1)
PROT SERPL-MCNC: 7.7 GM/DL (ref 5.8–7.6)
RBC # BLD AUTO: 4.12 X10(6)/MCL (ref 4.2–5.4)
SODIUM SERPL-SCNC: 140 MMOL/L (ref 136–145)
WBC # BLD AUTO: 7.85 X10(3)/MCL (ref 4.5–11.5)

## 2025-05-16 PROCEDURE — 85025 COMPLETE CBC W/AUTO DIFF WBC: CPT

## 2025-05-16 PROCEDURE — 36415 COLL VENOUS BLD VENIPUNCTURE: CPT

## 2025-05-16 PROCEDURE — 80053 COMPREHEN METABOLIC PANEL: CPT

## 2025-05-19 ENCOUNTER — RESULTS FOLLOW-UP (OUTPATIENT)
Dept: ORTHOPEDICS | Facility: CLINIC | Age: 65
End: 2025-05-19

## 2025-05-19 DIAGNOSIS — M17.12 OSTEOARTHRITIS OF LEFT KNEE, UNSPECIFIED OSTEOARTHRITIS TYPE: Primary | ICD-10-CM

## 2025-05-19 LAB
OHS QRS DURATION: 94 MS
OHS QTC CALCULATION: 412 MS

## 2025-05-19 RX ORDER — LEVOFLOXACIN 500 MG/1
500 TABLET, FILM COATED ORAL DAILY
Qty: 10 TABLET | Refills: 0 | Status: SHIPPED | OUTPATIENT
Start: 2025-05-19

## 2025-05-20 NOTE — PROGRESS NOTES
Referring physician: Dr. Deridre Welch  Reason for referral: DCIS    Subjective:       Patient ID: Barbara Alvarez is a 65 y.o. female.    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   Surgery/pathology:  Left partial mastectomy done 7/22/24--DCIS, nuclear Grade 2-3, with prominent apocrine features, no necrosis, measures 10mm, cribriform and papillary patterns, no comedonecrosis, calcium phosphate focal microcalcifications present, biopsy site changes associated with DCIS, margins negative.   Imaging:  Screening MMG 4/29/24--oval mass in the 12:00 left breast, posterior depth.  No other significant masses, calcifications, or other findings are seen in either breast.  BIRADS 0, needs additional evaluation.  Left Diagnostic MMG/US done 6/10/24--Left breast 11:00 10 cm from the nipple posterior depth 0.7 cm irregular hypoechoic mass with angular/spiculated margins is suspicious for malignancy.  BIRADS 4, biopsy recommended.    DEXA:  4/26/24--Lumbar vertebrae T = 0.4, left femoral neck -1.6, right femoral neck -1.4, c/w osteopenia.     Treatment history:   Left breast lumpectomy 7/22/24.  Adjuvant radiation completed on 10/1/24.     Current treatment plan:  Arimidex daily started on 10/6/24. Stopped on 10/25/24 due to hallucinations.   Attempted Femara in November but stopped due to hallucinations.   Refusing to try additional medications.     Chief Complaint: Fatigue    HPI  The patient presents for scheduled follow-up for her DCIS. She is doing well. Originally started her on Arimidex but stopped after 2 weeks due to hallucinations and feeling too scared to be alone. She had the same symptoms with Femara so she stopped in November 2024. Patient refused to start another AI. Left diagnostic MMG and US on 1/16/25 after having increasing  left breast mass and pain. Results showed evolving hematoma versus complex seroma. S/p aspiration x 2 with results benign. Routine screening MMG in April 2025 with benign findings. The seroma/hematoma in the left breast measures up to 7.5 cm and is slightly decreased. Mentions she is planning to have a left knee replacement next month. No other problems reported today.     Past Medical History:   Diagnosis Date    Breast cancer     Depression     History of blood clots     Hypertension     Lipoma of anterior chest wall       Past Surgical History:   Procedure Laterality Date    APPENDECTOMY      CHOLECYSTECTOMY      RENETTA FILTER PLACEMENT      2015    HYSTERECTOMY      KNEE SURGERY Left     MASTECTOMY, PARTIAL Left 7/22/2024    Procedure: MASTECTOMY, PARTIAL - left  w/ radiological marker;  Surgeon: Deirdre Welch MD;  Location: [facility] OR;  Service: General;  Laterality: Left;    NECK SURGERY      PLACEMENT, INFERIOR VENA CAVA FILTER Left     SHOULDER ARTHROSCOPY W/ ROTATOR CUFF REPAIR Left     STENT PLACEMENT/PRIOR TO CALIN Left 2015   Patient has h/o DVT after her first pregnancy and was taken off birth control, had recurrence of DVT after neck surgery in E and has h/o IVC filter placement and she is currently on Plavix.  Unable to take Coumadin, Lovenox or DOAC due to allergy, got rash.   Family History   Problem Relation Name Age of Onset    Cervical cancer Mother      Diabetes Mother      Hypertension Mother      Prostate cancer Father  79    Prostate cancer Brother  68    Hypertension Brother      Brain cancer Child  6    Cancer Maternal Uncle      Cancer Maternal Uncle      Cancer Maternal Uncle       Social History     Socioeconomic History    Marital status:    Tobacco Use    Smoking status: Never    Smokeless tobacco: Never   Substance and Sexual Activity    Alcohol use: Never    Drug use: Never     Social Drivers of Health     Financial Resource Strain: Low Risk  (11/14/2023)    Received  from [facility] Its Subsidiaries and Affiliates    Overall Financial Resource Strain (CARDIA)     Difficulty of Paying Living Expenses: Not hard at all   Food Insecurity: No Food Insecurity (2023)    Received from [facility] Subsidiaries and Affiliates    Hunger Vital Sign     Worried About Running Out of Food in the Last Year: Never true     Ran Out of Food in the Last Year: Never true   Menarche age 11, , +OCPs, stopped age 18 due to DVT, age at first live birth 18, menopause age 49, no HRT. FH mother with cervical cancer, brother with prostate cancer, child with brain cancer,  at age 6, likely medulloblastoma.     Review of patient's allergies indicates:   Allergen Reactions    Amitriptyline Other (See Comments)     Other Reaction(s): muscle tightening    MUSCLE WEAKNESS    Wellbutrin [bupropion hcl] Hallucinations    Adhesive Rash    Anastrozole Nausea And Vomiting and Other (See Comments)     Dizziness    Enoxaparin Rash    Methocarbamol Rash and Other (See Comments)     MUSCLE WEAKNESS    Rivaroxaban Rash    Warfarin Rash      Current Outpatient Medications on File Prior to Visit   Medication Sig Dispense Refill    amLODIPine (NORVASC) 10 MG tablet Take 10 mg by mouth.      calcium carbonate (CALCIUM 500 ORAL) Take by mouth.      clopidogreL (PLAVIX) 75 mg tablet       diclofenac sodium (VOLTAREN) 1 % Gel Apply topically once daily.      ergocalciferol, vitamin D2, (VITAMIN D ORAL) Take by mouth.      furosemide (LASIX) 40 MG tablet       irbesartan (AVAPRO) 300 MG tablet Take 300 mg by mouth.      levoFLOXacin (LEVAQUIN) 500 MG tablet Take 1 tablet (500 mg total) by mouth once daily. 10 tablet 0    acetaminophen-codeine 300-60mg (TYLENOL #4) 300-60 mg Tab Take 1 tablet by mouth. (Patient not taking: Reported on 2025)      rosuvastatin (CRESTOR) 10 MG tablet Take 10 mg by mouth. (Patient not taking:  "Reported on 5/26/2025)       No current facility-administered medications on file prior to visit.      Review of Systems   Constitutional:  Positive for fatigue. Negative for unexpected weight change.   HENT:  Negative for mouth sores.    Eyes:  Negative for visual disturbance.   Respiratory:  Negative for cough.    Gastrointestinal:  Negative for abdominal pain and diarrhea.   Genitourinary:  Negative for frequency.   Musculoskeletal:  Negative for back pain.        Left knee pain   Integumentary:  Negative for rash.        Left breast seroma   Neurological:  Negative for headaches.   Hematological:  Negative for adenopathy.   Psychiatric/Behavioral:  The patient is nervous/anxious.          Vitals:    05/26/25 1014   BP: 132/81   Pulse: 70   Resp: 14   Temp: 97.7 °F (36.5 °C)   TempSrc: Oral   SpO2: 96%   Weight: 105.1 kg (231 lb 12.8 oz)   Height: 5' 7" (1.702 m)     Physical Exam  Constitutional:       Appearance: Normal appearance. She is obese.   HENT:      Head: Normocephalic.      Nose: Nose normal.      Mouth/Throat:      Mouth: Mucous membranes are moist.   Eyes:      Extraocular Movements: Extraocular movements intact.      Conjunctiva/sclera: Conjunctivae normal.   Cardiovascular:      Rate and Rhythm: Normal rate and regular rhythm.   Pulmonary:      Effort: Pulmonary effort is normal.      Breath sounds: Normal breath sounds.   Chest:      Comments: Left breast incision intact, healed well. Immediately above lumpectomy incision there is a seroma present, stable. Right breast normal - deferred due to recent MMG.   Abdominal:      General: Abdomen is protuberant. Bowel sounds are normal. There is no distension.      Palpations: Abdomen is soft.      Tenderness: There is no abdominal tenderness.   Musculoskeletal:         General: Normal range of motion.      Comments: Chronic left knee pain, ambulates with limp.   Skin:     General: Skin is warm.   Neurological:      General: No focal deficit present.    "   Mental Status: She is alert and oriented to person, place, and time.   Psychiatric:         Mood and Affect: Mood normal.         Behavior: Behavior is cooperative.         Judgment: Judgment normal.       Lab Visit on 05/16/2025   Component Date Value    Sodium 05/16/2025 140     Potassium 05/16/2025 4.0     Chloride 05/16/2025 107     CO2 05/16/2025 25     Glucose 05/16/2025 134 (H)     Blood Urea Nitrogen 05/16/2025 18.7     Creatinine 05/16/2025 0.88     Calcium 05/16/2025 9.6     Protein Total 05/16/2025 7.7 (H)     Albumin 05/16/2025 3.8     Globulin 05/16/2025 3.9 (H)     Albumin/Globulin Ratio 05/16/2025 1.0 (L)     Bilirubin Total 05/16/2025 0.7     ALP 05/16/2025 115     ALT 05/16/2025 11     AST 05/16/2025 14     eGFR 05/16/2025 >60     Anion Gap 05/16/2025 8.0     BUN/Creatinine Ratio 05/16/2025 21     WBC 05/16/2025 7.85     RBC 05/16/2025 4.12 (L)     Hgb 05/16/2025 11.4 (L)     Hct 05/16/2025 37.3     MCV 05/16/2025 90.5     MCH 05/16/2025 27.7     MCHC 05/16/2025 30.6 (L)     RDW 05/16/2025 12.4     Platelet 05/16/2025 401 (H)     MPV 05/16/2025 9.1     Neut % 05/16/2025 70.6     Lymph % 05/16/2025 21.4     Mono % 05/16/2025 5.1     Eos % 05/16/2025 2.3     Basophil % 05/16/2025 0.5     Imm Grans % 05/16/2025 0.1     Neut # 05/16/2025 5.54     Lymph # 05/16/2025 1.68     Mono # 05/16/2025 0.40     Eos # 05/16/2025 0.18     Baso # 05/16/2025 0.04     Imm Gran # 05/16/2025 0.01    Orders Only on 05/15/2025   Component Date Value    QRS Duration 05/15/2025 94     OHS QTC Calculation 05/15/2025 412    Lab Visit on 05/15/2025   Component Date Value    Sodium 05/15/2025 143     Potassium 05/15/2025 4.0     Chloride 05/15/2025 107     CO2 05/15/2025 26     Glucose 05/15/2025 127 (H)     Blood Urea Nitrogen 05/15/2025 19.1     Creatinine 05/15/2025 0.88     Calcium 05/15/2025 9.7     Protein Total 05/15/2025 7.7 (H)     Albumin 05/15/2025 3.8     Globulin 05/15/2025 3.9 (H)     Albumin/Globulin Ratio  05/15/2025 1.0 (L)     Bilirubin Total 05/15/2025 0.6     ALP 05/15/2025 115     ALT 05/15/2025 11     AST 05/15/2025 13     eGFR 05/15/2025 >60     Anion Gap 05/15/2025 10.0     BUN/Creatinine Ratio 05/15/2025 22     Color, UA 05/15/2025 Straw     Appearance, UA 05/15/2025 Clear     Specific Gravity, UA 05/15/2025 1.010     pH, UA 05/15/2025 6.5     Protein, UA 05/15/2025 Trace (A)     Glucose, UA 05/15/2025 Negative     Ketones, UA 05/15/2025 Negative     Blood, UA 05/15/2025 Negative     Bilirubin, UA 05/15/2025 Negative     Urobilinogen, UA 05/15/2025 1.0     Nitrites, UA 05/15/2025 Positive (A)     Leukocyte Esterase, UA 05/15/2025 Moderate (A)     Hemoglobin A1c 05/15/2025 5.4     Estimated Average Glucose 05/15/2025 108.3     WBC 05/15/2025 8.43     RBC 05/15/2025 4.12 (L)     Hgb 05/15/2025 11.8 (L)     Hct 05/15/2025 37.0     MCV 05/15/2025 89.8     MCH 05/15/2025 28.6     MCHC 05/15/2025 31.9 (L)     RDW 05/15/2025 12.4     Platelet 05/15/2025 414 (H)     MPV 05/15/2025 9.6     Neut % 05/15/2025 75.5     Lymph % 05/15/2025 17.6     Mono % 05/15/2025 4.7     Eos % 05/15/2025 1.9     Basophil % 05/15/2025 0.2     Imm Grans % 05/15/2025 0.1     Neut # 05/15/2025 6.36     Lymph # 05/15/2025 1.48     Mono # 05/15/2025 0.40     Eos # 05/15/2025 0.16     Baso # 05/15/2025 0.02     Imm Gran # 05/15/2025 0.01     Bacteria, UA 05/15/2025 Few (A)     RBC, UA 05/15/2025 None Seen     WBC, UA 05/15/2025 3-5     Squamous Epithelial Cell* 05/15/2025 Rare           Assessment:       1. Ductal carcinoma in situ (DCIS) of left breast    2. Anemia, unspecified type         Plan:       Patient with Left breast DCIS, s/p lumpectomy done 7/22/24. DCIS measured 10mm, Grade 2-3, ER and MA strongly positive.   Recommend treatment with adjuvant radiation and endocrine therapy X 5 years. Explained rationale for treatment.     She completed adjuvant radiation on 10/1/24.   Arimidex daily started on 10/6/24. Stopped on 10/25/24 due to  side effects.   Attempted Femara in November 2024 but she stopped due to return of hallucinations.   Patient now refusing to try Aromasin.   Recent labs with continued mild anemia. Normal MCV and MCH. Will add additional workup with her next appointment.   Patient is not a candidate for Tamoxifen. Has h/o recurrent DVT LLE, first in pregnancy and 2nd after neck surgery. Sees Dr. Omer and had stents placed, assume she had May Thurner syndrome. She got a rash with Lovenox, Coumadin and Xarelto. I suppose if she gets another blood clot, could try Eliquis. Remains on Plavix for the stents.    Patient had a recent left diagnostic MMG and US on 1/16/25 after having increasing left breast mass and pain. Results showed evolving hematoma versus complex seroma. S/p aspiration on 1/23/25 and 1/30/25 with results benign. Recommend she continue with routine screening MMG due in April 2025, ordered by surgeon.   Screening MMG on 4/28/25 with benign findings. The seroma/hematoma in the left breast measures up to 7.5 cm and is slightly decreased. Recommend to continue with annual screening.   CBC with continued, mild anemia. Normal MCV and MCH. Patient states she cannot tolerate oral iron due to hallucinations. Will obtain anemia workup at her next appointment.     DEXA from 4/2024 with osteopenia. Will defer to her PCP since she is refusing AI.   Referred for genetic testing due to FH brother prostate cancer, child with brain tumor. Results negative.   Continue surveillance visits every 6 months.     All questions answered at this time.      Dorene Sanchez [facility]     Visit today included increased complexity associated with the longitudinal care of the patient's breast cancer management.    Prior to the patient's arrival on the same day, I spent (5) minutes reviewing chart. Once in the exam room with the patient, I spent (10) minutes in the room with the member performing a history and exam as well as reviewing the test results and  recommendations with the patient. After leaving the exam room, I spent an additional (5) minutes completing the electronic health record. The total time spent that day making medical decisions for the patient is (20) minutes, and this time - including the breakdown - is documented in the medical record.

## 2025-05-26 ENCOUNTER — ANESTHESIA EVENT (OUTPATIENT)
Dept: SURGERY | Facility: HOSPITAL | Age: 65
End: 2025-05-26
Payer: MEDICARE

## 2025-05-26 ENCOUNTER — OFFICE VISIT (OUTPATIENT)
Dept: HEMATOLOGY/ONCOLOGY | Facility: CLINIC | Age: 65
End: 2025-05-26
Payer: MEDICARE

## 2025-05-26 VITALS
TEMPERATURE: 98 F | SYSTOLIC BLOOD PRESSURE: 132 MMHG | DIASTOLIC BLOOD PRESSURE: 81 MMHG | BODY MASS INDEX: 36.38 KG/M2 | WEIGHT: 231.81 LBS | OXYGEN SATURATION: 96 % | HEIGHT: 67 IN | RESPIRATION RATE: 14 BRPM | HEART RATE: 70 BPM

## 2025-05-26 DIAGNOSIS — D05.12 DUCTAL CARCINOMA IN SITU (DCIS) OF LEFT BREAST: Primary | ICD-10-CM

## 2025-05-26 DIAGNOSIS — D64.9 ANEMIA, UNSPECIFIED TYPE: ICD-10-CM

## 2025-05-26 PROCEDURE — 99214 OFFICE O/P EST MOD 30 MIN: CPT | Mod: PBBFAC | Performed by: NURSE PRACTITIONER

## 2025-05-26 PROCEDURE — 99214 OFFICE O/P EST MOD 30 MIN: CPT | Mod: S$PBB,,, | Performed by: NURSE PRACTITIONER

## 2025-05-26 PROCEDURE — 99999 PR PBB SHADOW E&M-EST. PATIENT-LVL IV: CPT | Mod: PBBFAC,,, | Performed by: NURSE PRACTITIONER

## 2025-05-26 PROCEDURE — G2211 COMPLEX E/M VISIT ADD ON: HCPCS | Mod: S$PBB,,, | Performed by: NURSE PRACTITIONER

## 2025-06-03 ENCOUNTER — LAB VISIT (OUTPATIENT)
Dept: LAB | Facility: HOSPITAL | Age: 65
End: 2025-06-03
Attending: ORTHOPAEDIC SURGERY
Payer: MEDICARE

## 2025-06-03 DIAGNOSIS — Z01.818 PRE-OP EXAM: ICD-10-CM

## 2025-06-03 DIAGNOSIS — M17.12 OSTEOARTHRITIS OF LEFT KNEE, UNSPECIFIED OSTEOARTHRITIS TYPE: ICD-10-CM

## 2025-06-03 LAB — MRSA PCR SCRN (OHS): DETECTED

## 2025-06-03 PROCEDURE — 87641 MR-STAPH DNA AMP PROBE: CPT

## 2025-06-04 DIAGNOSIS — M17.12 PRIMARY OSTEOARTHRITIS OF LEFT KNEE: Primary | ICD-10-CM

## 2025-06-04 RX ORDER — MUPIROCIN 20 MG/G
OINTMENT TOPICAL
Qty: 30 G | Refills: 0 | Status: SHIPPED | OUTPATIENT
Start: 2025-06-04

## 2025-06-04 NOTE — CLINICAL REVIEW
Plavix last dose 6/3/25. labs/EKG/CXR reviewed. MRSA+/ua+. M MILENA Garcia aware. cardiology notes utd. Echo reviewed. CRA provided. chart review complete. ccv

## 2025-06-05 ENCOUNTER — TELEPHONE (OUTPATIENT)
Dept: ORTHOPEDICS | Facility: CLINIC | Age: 65
End: 2025-06-05
Payer: MEDICARE

## 2025-06-06 NOTE — PLAN OF CARE
Surgery scheduled 6/10- TKR. Spk w pt at preop joint class & via phone. Friend, Hayde to asst w homecare. Pt needs RW. Requesting [facility] @  for outpt therapy. Foc obtained.    Called referral for outpt therapy to [facility] @ . Faxed referral for dme to [facility]. They will deliver to hospital.   Contact # Hayde [phone]  Pt [phone]

## 2025-06-09 ENCOUNTER — TELEPHONE (OUTPATIENT)
Dept: ORTHOPEDICS | Facility: CLINIC | Age: 65
End: 2025-06-09
Payer: MEDICARE

## 2025-06-10 ENCOUNTER — HOSPITAL ENCOUNTER (OUTPATIENT)
Facility: HOSPITAL | Age: 65
Discharge: HOME | End: 2025-06-11
Attending: ORTHOPAEDIC SURGERY | Admitting: ORTHOPAEDIC SURGERY
Payer: MEDICARE

## 2025-06-10 ENCOUNTER — ANESTHESIA (OUTPATIENT)
Dept: SURGERY | Facility: HOSPITAL | Age: 65
End: 2025-06-10
Payer: MEDICARE

## 2025-06-10 DIAGNOSIS — M17.12 OSTEOARTHRITIS OF LEFT KNEE, UNSPECIFIED OSTEOARTHRITIS TYPE: ICD-10-CM

## 2025-06-10 DIAGNOSIS — Z01.818 PRE-OP EXAM: ICD-10-CM

## 2025-06-10 DIAGNOSIS — M17.12 OSTEOARTHRITIS OF LEFT KNEE: ICD-10-CM

## 2025-06-10 LAB
HCT VFR BLD AUTO: 35.1 % (ref 37–47)
HGB BLD-MCNC: 11.2 G/DL (ref 12–16)
POCT GLUCOSE: 111 MG/DL (ref 70–110)
POCT GLUCOSE: 113 MG/DL (ref 70–110)
POCT GLUCOSE: 193 MG/DL (ref 70–110)

## 2025-06-10 PROCEDURE — 25000003 PHARM REV CODE 250: Performed by: NURSE PRACTITIONER

## 2025-06-10 PROCEDURE — C1713 ANCHOR/SCREW BN/BN,TIS/BN: HCPCS | Performed by: ORTHOPAEDIC SURGERY

## 2025-06-10 PROCEDURE — 64447 NJX AA&/STRD FEMORAL NRV IMG: CPT | Performed by: ANESTHESIOLOGY

## 2025-06-10 PROCEDURE — 36415 COLL VENOUS BLD VENIPUNCTURE: CPT

## 2025-06-10 PROCEDURE — 0055T BONE SRGRY CMPTR CT/MRI IMAG: CPT | Mod: ,,, | Performed by: ORTHOPAEDIC SURGERY

## 2025-06-10 PROCEDURE — 25000003 PHARM REV CODE 250: Performed by: ANESTHESIOLOGY

## 2025-06-10 PROCEDURE — 94761 N-INVAS EAR/PLS OXIMETRY MLT: CPT

## 2025-06-10 PROCEDURE — G0378 HOSPITAL OBSERVATION PER HR: HCPCS

## 2025-06-10 PROCEDURE — 71000033 HC RECOVERY, INTIAL HOUR: Performed by: ORTHOPAEDIC SURGERY

## 2025-06-10 PROCEDURE — 94799 UNLISTED PULMONARY SVC/PX: CPT

## 2025-06-10 PROCEDURE — 82962 GLUCOSE BLOOD TEST: CPT | Performed by: ORTHOPAEDIC SURGERY

## 2025-06-10 PROCEDURE — 27447 TOTAL KNEE ARTHROPLASTY: CPT | Mod: LT,,, | Performed by: ORTHOPAEDIC SURGERY

## 2025-06-10 PROCEDURE — 71000039 HC RECOVERY, EACH ADD'L HOUR: Performed by: ORTHOPAEDIC SURGERY

## 2025-06-10 PROCEDURE — 51798 US URINE CAPACITY MEASURE: CPT | Performed by: ORTHOPAEDIC SURGERY

## 2025-06-10 PROCEDURE — 99900035 HC TECH TIME PER 15 MIN (STAT)

## 2025-06-10 PROCEDURE — 36000713 HC OR TIME LEV V EA ADD 15 MIN: Performed by: ORTHOPAEDIC SURGERY

## 2025-06-10 PROCEDURE — 63600175 PHARM REV CODE 636 W HCPCS: Performed by: ANESTHESIOLOGY

## 2025-06-10 PROCEDURE — D9220A PRA ANESTHESIA: Mod: CRNA,,, | Performed by: STUDENT IN AN ORGANIZED HEALTH CARE EDUCATION/TRAINING PROGRAM

## 2025-06-10 PROCEDURE — 63600175 PHARM REV CODE 636 W HCPCS

## 2025-06-10 PROCEDURE — D9220A PRA ANESTHESIA: Mod: ANES,,, | Performed by: ANESTHESIOLOGY

## 2025-06-10 PROCEDURE — 25000003 PHARM REV CODE 250

## 2025-06-10 PROCEDURE — 36000712 HC OR TIME LEV V 1ST 15 MIN: Performed by: ORTHOPAEDIC SURGERY

## 2025-06-10 PROCEDURE — 27000221 HC OXYGEN, UP TO 24 HOURS

## 2025-06-10 PROCEDURE — 63600175 PHARM REV CODE 636 W HCPCS: Performed by: ORTHOPAEDIC SURGERY

## 2025-06-10 PROCEDURE — 99900031 HC PATIENT EDUCATION (STAT)

## 2025-06-10 PROCEDURE — 51798 US URINE CAPACITY MEASURE: CPT

## 2025-06-10 PROCEDURE — 51701 INSERT BLADDER CATHETER: CPT

## 2025-06-10 PROCEDURE — 27447 TOTAL KNEE ARTHROPLASTY: CPT | Mod: AS,LT,,

## 2025-06-10 PROCEDURE — 27201423 OPTIME MED/SURG SUP & DEVICES STERILE SUPPLY: Performed by: ORTHOPAEDIC SURGERY

## 2025-06-10 PROCEDURE — C1776 JOINT DEVICE (IMPLANTABLE): HCPCS | Performed by: ORTHOPAEDIC SURGERY

## 2025-06-10 PROCEDURE — 37000008 HC ANESTHESIA 1ST 15 MINUTES: Performed by: ORTHOPAEDIC SURGERY

## 2025-06-10 PROCEDURE — 37000009 HC ANESTHESIA EA ADD 15 MINS: Performed by: ORTHOPAEDIC SURGERY

## 2025-06-10 PROCEDURE — 25000003 PHARM REV CODE 250: Performed by: STUDENT IN AN ORGANIZED HEALTH CARE EDUCATION/TRAINING PROGRAM

## 2025-06-10 PROCEDURE — 97162 PT EVAL MOD COMPLEX 30 MIN: CPT

## 2025-06-10 PROCEDURE — 63600175 PHARM REV CODE 636 W HCPCS: Performed by: STUDENT IN AN ORGANIZED HEALTH CARE EDUCATION/TRAINING PROGRAM

## 2025-06-10 PROCEDURE — 85018 HEMOGLOBIN: CPT

## 2025-06-10 PROCEDURE — P9047 ALBUMIN (HUMAN), 25%, 50ML: HCPCS

## 2025-06-10 DEVICE — CRUCIATE RETAINING FEMORAL
Type: IMPLANTABLE DEVICE | Site: KNEE | Status: FUNCTIONAL
Brand: TRIATHLON

## 2025-06-10 DEVICE — TIBIAL BEARING INSERT - CS
Type: IMPLANTABLE DEVICE | Site: KNEE | Status: FUNCTIONAL
Brand: TRIATHLON

## 2025-06-10 DEVICE — TIBIAL COMPONENT
Type: IMPLANTABLE DEVICE | Site: KNEE | Status: FUNCTIONAL
Brand: TRIATHLON

## 2025-06-10 RX ORDER — HYDROMORPHONE HYDROCHLORIDE 2 MG/ML
0.4 INJECTION, SOLUTION INTRAMUSCULAR; INTRAVENOUS; SUBCUTANEOUS EVERY 5 MIN PRN
Status: DISCONTINUED | OUTPATIENT
Start: 2025-06-10 | End: 2025-06-10

## 2025-06-10 RX ORDER — BUPIVACAINE HYDROCHLORIDE 2.5 MG/ML
INJECTION, SOLUTION EPIDURAL; INFILTRATION; INTRACAUDAL; PERINEURAL
Status: DISCONTINUED | OUTPATIENT
Start: 2025-06-10 | End: 2025-06-10

## 2025-06-10 RX ORDER — PROPOFOL 10 MG/ML
VIAL (ML) INTRAVENOUS
Status: DISCONTINUED | OUTPATIENT
Start: 2025-06-10 | End: 2025-06-10

## 2025-06-10 RX ORDER — AMOXICILLIN 250 MG
2 CAPSULE ORAL 2 TIMES DAILY
Status: DISCONTINUED | OUTPATIENT
Start: 2025-06-10 | End: 2025-06-11 | Stop reason: HOSPADM

## 2025-06-10 RX ORDER — LOSARTAN POTASSIUM 50 MG/1
100 TABLET ORAL NIGHTLY
Status: DISCONTINUED | OUTPATIENT
Start: 2025-06-10 | End: 2025-06-11 | Stop reason: HOSPADM

## 2025-06-10 RX ORDER — SODIUM CHLORIDE, SODIUM GLUCONATE, SODIUM ACETATE, POTASSIUM CHLORIDE AND MAGNESIUM CHLORIDE 30; 37; 368; 526; 502 MG/100ML; MG/100ML; MG/100ML; MG/100ML; MG/100ML
INJECTION, SOLUTION INTRAVENOUS CONTINUOUS
Status: DISCONTINUED | OUTPATIENT
Start: 2025-06-10 | End: 2025-06-10

## 2025-06-10 RX ORDER — ACETAMINOPHEN 10 MG/ML
1000 INJECTION, SOLUTION INTRAVENOUS ONCE
Status: DISCONTINUED | OUTPATIENT
Start: 2025-06-10 | End: 2025-06-10

## 2025-06-10 RX ORDER — POLYETHYLENE GLYCOL 3350 17 G/17G
17 POWDER, FOR SOLUTION ORAL NIGHTLY
Status: DISCONTINUED | OUTPATIENT
Start: 2025-06-10 | End: 2025-06-11 | Stop reason: HOSPADM

## 2025-06-10 RX ORDER — TRAMADOL HYDROCHLORIDE 50 MG/1
50 TABLET, FILM COATED ORAL EVERY 4 HOURS PRN
Status: DISCONTINUED | OUTPATIENT
Start: 2025-06-10 | End: 2025-06-11 | Stop reason: HOSPADM

## 2025-06-10 RX ORDER — SODIUM CITRATE AND CITRIC ACID MONOHYDRATE 334; 500 MG/5ML; MG/5ML
SOLUTION ORAL
Status: DISPENSED
Start: 2025-06-10 | End: 2025-06-10

## 2025-06-10 RX ORDER — ONDANSETRON HYDROCHLORIDE 2 MG/ML
4 INJECTION, SOLUTION INTRAVENOUS EVERY 6 HOURS PRN
Status: DISCONTINUED | OUTPATIENT
Start: 2025-06-10 | End: 2025-06-11 | Stop reason: HOSPADM

## 2025-06-10 RX ORDER — HYDROCODONE BITARTRATE AND ACETAMINOPHEN 5; 325 MG/1; MG/1
1 TABLET ORAL EVERY 4 HOURS PRN
Status: DISCONTINUED | OUTPATIENT
Start: 2025-06-10 | End: 2025-06-10

## 2025-06-10 RX ORDER — HYDROMORPHONE HYDROCHLORIDE 2 MG/ML
INJECTION, SOLUTION INTRAMUSCULAR; INTRAVENOUS; SUBCUTANEOUS
Status: COMPLETED
Start: 2025-06-10 | End: 2025-06-10

## 2025-06-10 RX ORDER — BUPIVACAINE HYDROCHLORIDE 7.5 MG/ML
INJECTION, SOLUTION EPIDURAL; RETROBULBAR
Status: COMPLETED | OUTPATIENT
Start: 2025-06-10 | End: 2025-06-10

## 2025-06-10 RX ORDER — HYDROMORPHONE HYDROCHLORIDE 2 MG/ML
0.4 INJECTION, SOLUTION INTRAMUSCULAR; INTRAVENOUS; SUBCUTANEOUS EVERY 5 MIN PRN
Refills: 0 | Status: DISCONTINUED | OUTPATIENT
Start: 2025-06-10 | End: 2025-06-10 | Stop reason: HOSPADM

## 2025-06-10 RX ORDER — MIDAZOLAM HYDROCHLORIDE 2 MG/2ML
2 INJECTION, SOLUTION INTRAMUSCULAR; INTRAVENOUS ONCE AS NEEDED
OUTPATIENT
Start: 2025-06-10 | End: 2036-11-05

## 2025-06-10 RX ORDER — CYCLOBENZAPRINE HCL 5 MG
5 TABLET ORAL 3 TIMES DAILY PRN
Status: DISCONTINUED | OUTPATIENT
Start: 2025-06-10 | End: 2025-06-11 | Stop reason: HOSPADM

## 2025-06-10 RX ORDER — NAPROXEN SODIUM 220 MG/1
81 TABLET, FILM COATED ORAL DAILY
Status: DISCONTINUED | OUTPATIENT
Start: 2025-06-11 | End: 2025-06-11 | Stop reason: HOSPADM

## 2025-06-10 RX ORDER — CLOPIDOGREL BISULFATE 75 MG/1
75 TABLET ORAL DAILY
Status: DISCONTINUED | OUTPATIENT
Start: 2025-06-11 | End: 2025-06-11 | Stop reason: HOSPADM

## 2025-06-10 RX ORDER — LIDOCAINE HYDROCHLORIDE 10 MG/ML
INJECTION, SOLUTION EPIDURAL; INFILTRATION; INTRACAUDAL; PERINEURAL
Status: DISCONTINUED | OUTPATIENT
Start: 2025-06-10 | End: 2025-06-10

## 2025-06-10 RX ORDER — TALC
6 POWDER (GRAM) TOPICAL NIGHTLY PRN
Status: DISCONTINUED | OUTPATIENT
Start: 2025-06-10 | End: 2025-06-11 | Stop reason: HOSPADM

## 2025-06-10 RX ORDER — ADHESIVE BANDAGE
30 BANDAGE TOPICAL DAILY PRN
Status: DISCONTINUED | OUTPATIENT
Start: 2025-06-10 | End: 2025-06-11 | Stop reason: HOSPADM

## 2025-06-10 RX ORDER — DOCUSATE SODIUM 100 MG/1
200 CAPSULE, LIQUID FILLED ORAL
Status: DISCONTINUED | OUTPATIENT
Start: 2025-06-11 | End: 2025-06-11 | Stop reason: HOSPADM

## 2025-06-10 RX ORDER — CEFAZOLIN 2 G/1
2 INJECTION, POWDER, FOR SOLUTION INTRAMUSCULAR; INTRAVENOUS
Status: COMPLETED | OUTPATIENT
Start: 2025-06-10 | End: 2025-06-10

## 2025-06-10 RX ORDER — ONDANSETRON HYDROCHLORIDE 2 MG/ML
4 INJECTION, SOLUTION INTRAVENOUS DAILY PRN
Status: DISCONTINUED | OUTPATIENT
Start: 2025-06-10 | End: 2025-06-10

## 2025-06-10 RX ORDER — FAMOTIDINE 20 MG/1
20 TABLET, FILM COATED ORAL
Status: DISCONTINUED | OUTPATIENT
Start: 2025-06-11 | End: 2025-06-11 | Stop reason: HOSPADM

## 2025-06-10 RX ORDER — KETOROLAC TROMETHAMINE 10 MG/1
10 TABLET, FILM COATED ORAL
Status: COMPLETED | OUTPATIENT
Start: 2025-06-10 | End: 2025-06-10

## 2025-06-10 RX ORDER — SCOPOLAMINE 1 MG/3D
1 PATCH, EXTENDED RELEASE TRANSDERMAL ONCE AS NEEDED
Status: DISCONTINUED | OUTPATIENT
Start: 2025-06-10 | End: 2025-06-10

## 2025-06-10 RX ORDER — ALBUMIN HUMAN 250 G/1000ML
12.5 SOLUTION INTRAVENOUS ONCE
Status: DISCONTINUED | OUTPATIENT
Start: 2025-06-10 | End: 2025-06-10 | Stop reason: HOSPADM

## 2025-06-10 RX ORDER — OXYCODONE AND ACETAMINOPHEN 5; 325 MG/1; MG/1
1 TABLET ORAL EVERY 4 HOURS PRN
Refills: 0 | Status: DISCONTINUED | OUTPATIENT
Start: 2025-06-10 | End: 2025-06-11 | Stop reason: HOSPADM

## 2025-06-10 RX ORDER — VANCOMYCIN HYDROCHLORIDE 1 G/20ML
INJECTION, POWDER, LYOPHILIZED, FOR SOLUTION INTRAVENOUS
Status: DISPENSED
Start: 2025-06-10 | End: 2025-06-10

## 2025-06-10 RX ORDER — ACETAMINOPHEN 10 MG/ML
1000 INJECTION, SOLUTION INTRAVENOUS ONCE
Status: COMPLETED | OUTPATIENT
Start: 2025-06-10 | End: 2025-06-10

## 2025-06-10 RX ORDER — LIDOCAINE HYDROCHLORIDE 10 MG/ML
1 INJECTION, SOLUTION EPIDURAL; INFILTRATION; INTRACAUDAL; PERINEURAL ONCE
OUTPATIENT
Start: 2025-06-10 | End: 2025-06-10

## 2025-06-10 RX ORDER — ALUMINUM HYDROXIDE, MAGNESIUM HYDROXIDE, AND SIMETHICONE 1200; 120; 1200 MG/30ML; MG/30ML; MG/30ML
30 SUSPENSION ORAL EVERY 6 HOURS PRN
Status: DISCONTINUED | OUTPATIENT
Start: 2025-06-10 | End: 2025-06-11 | Stop reason: HOSPADM

## 2025-06-10 RX ORDER — ALBUMIN HUMAN 250 G/1000ML
SOLUTION INTRAVENOUS
Status: COMPLETED
Start: 2025-06-10 | End: 2025-06-10

## 2025-06-10 RX ORDER — GLYCOPYRROLATE 0.2 MG/ML
INJECTION INTRAMUSCULAR; INTRAVENOUS
Status: DISCONTINUED | OUTPATIENT
Start: 2025-06-10 | End: 2025-06-10

## 2025-06-10 RX ORDER — GABAPENTIN 300 MG/1
300 CAPSULE ORAL
Status: COMPLETED | OUTPATIENT
Start: 2025-06-10 | End: 2025-06-10

## 2025-06-10 RX ORDER — VANCOMYCIN HYDROCHLORIDE 1 G/20ML
INJECTION, POWDER, LYOPHILIZED, FOR SOLUTION INTRAVENOUS
Status: DISCONTINUED | OUTPATIENT
Start: 2025-06-10 | End: 2025-06-10 | Stop reason: HOSPADM

## 2025-06-10 RX ORDER — SODIUM CITRATE AND CITRIC ACID MONOHYDRATE 334; 500 MG/5ML; MG/5ML
30 SOLUTION ORAL ONCE
Status: COMPLETED | OUTPATIENT
Start: 2025-06-10 | End: 2025-06-10

## 2025-06-10 RX ORDER — ALBUMIN HUMAN 250 G/1000ML
12.5 SOLUTION INTRAVENOUS ONCE
Status: COMPLETED | OUTPATIENT
Start: 2025-06-10 | End: 2025-06-10

## 2025-06-10 RX ORDER — BUPIVACAINE HYDROCHLORIDE 2.5 MG/ML
INJECTION, SOLUTION EPIDURAL; INFILTRATION; INTRACAUDAL; PERINEURAL
Status: COMPLETED
Start: 2025-06-10 | End: 2025-06-10

## 2025-06-10 RX ORDER — MIDAZOLAM HYDROCHLORIDE 1 MG/ML
INJECTION INTRAMUSCULAR; INTRAVENOUS
Status: DISCONTINUED | OUTPATIENT
Start: 2025-06-10 | End: 2025-06-10

## 2025-06-10 RX ORDER — FUROSEMIDE 40 MG/1
40 TABLET ORAL 2 TIMES DAILY
Status: DISCONTINUED | OUTPATIENT
Start: 2025-06-11 | End: 2025-06-11

## 2025-06-10 RX ORDER — HYDROCODONE BITARTRATE AND ACETAMINOPHEN 7.5; 325 MG/1; MG/1
1 TABLET ORAL EVERY 4 HOURS PRN
Status: DISCONTINUED | OUTPATIENT
Start: 2025-06-10 | End: 2025-06-10

## 2025-06-10 RX ORDER — DIPHENHYDRAMINE HYDROCHLORIDE 50 MG/ML
25 INJECTION, SOLUTION INTRAMUSCULAR; INTRAVENOUS EVERY 6 HOURS PRN
Status: DISCONTINUED | OUTPATIENT
Start: 2025-06-10 | End: 2025-06-10

## 2025-06-10 RX ORDER — HYDROCODONE BITARTRATE AND ACETAMINOPHEN 10; 325 MG/1; MG/1
1 TABLET ORAL EVERY 4 HOURS PRN
Status: DISCONTINUED | OUTPATIENT
Start: 2025-06-10 | End: 2025-06-10

## 2025-06-10 RX ORDER — BUPIVACAINE 13.3 MG/ML
INJECTION, SUSPENSION, LIPOSOMAL INFILTRATION
Status: DISPENSED
Start: 2025-06-10 | End: 2025-06-10

## 2025-06-10 RX ORDER — ONDANSETRON 4 MG/1
4 TABLET, ORALLY DISINTEGRATING ORAL
Status: COMPLETED | OUTPATIENT
Start: 2025-06-10 | End: 2025-06-10

## 2025-06-10 RX ORDER — SODIUM CHLORIDE 9 MG/ML
INJECTION, SOLUTION INTRAVENOUS CONTINUOUS
Status: ACTIVE | OUTPATIENT
Start: 2025-06-10 | End: 2025-06-11

## 2025-06-10 RX ORDER — METOCLOPRAMIDE HYDROCHLORIDE 5 MG/ML
10 INJECTION INTRAMUSCULAR; INTRAVENOUS
Status: DISCONTINUED | OUTPATIENT
Start: 2025-06-10 | End: 2025-06-11 | Stop reason: HOSPADM

## 2025-06-10 RX ORDER — SODIUM CHLORIDE, SODIUM GLUCONATE, SODIUM ACETATE, POTASSIUM CHLORIDE AND MAGNESIUM CHLORIDE 30; 37; 368; 526; 502 MG/100ML; MG/100ML; MG/100ML; MG/100ML; MG/100ML
INJECTION, SOLUTION INTRAVENOUS CONTINUOUS
OUTPATIENT
Start: 2025-06-10 | End: 2025-07-10

## 2025-06-10 RX ORDER — BISACODYL 10 MG/1
10 SUPPOSITORY RECTAL DAILY
Status: DISCONTINUED | OUTPATIENT
Start: 2025-06-13 | End: 2025-06-11 | Stop reason: HOSPADM

## 2025-06-10 RX ORDER — ACETAMINOPHEN 500 MG
500 TABLET ORAL
Status: DISCONTINUED | OUTPATIENT
Start: 2025-06-10 | End: 2025-06-11 | Stop reason: HOSPADM

## 2025-06-10 RX ORDER — ACETAMINOPHEN 500 MG
1000 TABLET ORAL
Status: COMPLETED | OUTPATIENT
Start: 2025-06-10 | End: 2025-06-10

## 2025-06-10 RX ORDER — SEVOFLURANE 250 ML/250ML
LIQUID RESPIRATORY (INHALATION)
Status: DISPENSED
Start: 2025-06-10 | End: 2025-06-10

## 2025-06-10 RX ORDER — GLUCAGON 1 MG
1 KIT INJECTION
Status: DISCONTINUED | OUTPATIENT
Start: 2025-06-10 | End: 2025-06-10

## 2025-06-10 RX ORDER — GABAPENTIN 300 MG/1
300 CAPSULE ORAL NIGHTLY
Status: DISCONTINUED | OUTPATIENT
Start: 2025-06-10 | End: 2025-06-10

## 2025-06-10 RX ADMIN — SODIUM CHLORIDE, SODIUM GLUCONATE, SODIUM ACETATE, POTASSIUM CHLORIDE AND MAGNESIUM CHLORIDE: 526; 502; 368; 37; 30 INJECTION, SOLUTION INTRAVENOUS at 07:06

## 2025-06-10 RX ADMIN — ALBUMIN HUMAN 12.5 G: 250 SOLUTION INTRAVENOUS at 10:06

## 2025-06-10 RX ADMIN — METOCLOPRAMIDE 10 MG: 5 INJECTION, SOLUTION INTRAMUSCULAR; INTRAVENOUS at 01:06

## 2025-06-10 RX ADMIN — SODIUM CITRATE AND CITRIC ACID MONOHYDRATE 30 ML: 500; 334 SOLUTION ORAL at 09:06

## 2025-06-10 RX ADMIN — OXYCODONE HYDROCHLORIDE AND ACETAMINOPHEN 0.5 TABLET: 5; 325 TABLET ORAL at 01:06

## 2025-06-10 RX ADMIN — PHENYLEPHRINE HYDROCHLORIDE 40 MCG/MIN: 10 INJECTION INTRAVENOUS at 07:06

## 2025-06-10 RX ADMIN — ONDANSETRON 4 MG: 2 INJECTION INTRAMUSCULAR; INTRAVENOUS at 09:06

## 2025-06-10 RX ADMIN — Medication 6 MG: at 08:06

## 2025-06-10 RX ADMIN — GABAPENTIN 300 MG: 300 CAPSULE ORAL at 06:06

## 2025-06-10 RX ADMIN — BUPIVACAINE HYDROCHLORIDE 20 ML: 2.5 INJECTION, SOLUTION EPIDURAL; INFILTRATION; INTRACAUDAL; PERINEURAL at 09:06

## 2025-06-10 RX ADMIN — SODIUM CHLORIDE, SODIUM GLUCONATE, SODIUM ACETATE, POTASSIUM CHLORIDE AND MAGNESIUM CHLORIDE: 526; 502; 368; 37; 30 INJECTION, SOLUTION INTRAVENOUS at 06:06

## 2025-06-10 RX ADMIN — VANCOMYCIN HYDROCHLORIDE 1500 MG: 1.5 INJECTION, POWDER, LYOPHILIZED, FOR SOLUTION INTRAVENOUS at 06:06

## 2025-06-10 RX ADMIN — ONDANSETRON 4 MG: 4 TABLET, ORALLY DISINTEGRATING ORAL at 06:06

## 2025-06-10 RX ADMIN — ACETAMINOPHEN 1000 MG: 10 INJECTION INTRAVENOUS at 05:06

## 2025-06-10 RX ADMIN — CEFAZOLIN 2 G: 2 INJECTION, POWDER, FOR SOLUTION INTRAMUSCULAR; INTRAVENOUS at 02:06

## 2025-06-10 RX ADMIN — LIDOCAINE HYDROCHLORIDE 50 MG: 10 INJECTION, SOLUTION EPIDURAL; INFILTRATION; INTRACAUDAL; PERINEURAL at 07:06

## 2025-06-10 RX ADMIN — SENNOSIDES AND DOCUSATE SODIUM 2 TABLET: 50; 8.6 TABLET ORAL at 08:06

## 2025-06-10 RX ADMIN — LOSARTAN POTASSIUM 100 MG: 50 TABLET, FILM COATED ORAL at 08:06

## 2025-06-10 RX ADMIN — ALBUMIN (HUMAN) 12.5 G: 5 SOLUTION INTRAVENOUS at 10:06

## 2025-06-10 RX ADMIN — HYDROMORPHONE HYDROCHLORIDE 0.4 MG: 2 INJECTION INTRAMUSCULAR; INTRAVENOUS; SUBCUTANEOUS at 09:06

## 2025-06-10 RX ADMIN — HYDROMORPHONE HYDROCHLORIDE 0.4 MG: 2 INJECTION, SOLUTION INTRAMUSCULAR; INTRAVENOUS; SUBCUTANEOUS at 09:06

## 2025-06-10 RX ADMIN — GLYCOPYRROLATE 0.2 MG: 0.2 INJECTION INTRAMUSCULAR; INTRAVENOUS at 07:06

## 2025-06-10 RX ADMIN — CEFAZOLIN 2 G: 2 INJECTION, POWDER, FOR SOLUTION INTRAMUSCULAR; INTRAVENOUS at 07:06

## 2025-06-10 RX ADMIN — POLYETHYLENE GLYCOL 3350 17 G: 17 POWDER, FOR SOLUTION ORAL at 08:06

## 2025-06-10 RX ADMIN — METOCLOPRAMIDE 10 MG: 5 INJECTION, SOLUTION INTRAMUSCULAR; INTRAVENOUS at 06:06

## 2025-06-10 RX ADMIN — CEFAZOLIN 2 G: 2 INJECTION, POWDER, FOR SOLUTION INTRAMUSCULAR; INTRAVENOUS at 08:06

## 2025-06-10 RX ADMIN — SODIUM CHLORIDE: 9 INJECTION, SOLUTION INTRAVENOUS at 10:06

## 2025-06-10 RX ADMIN — VANCOMYCIN HYDROCHLORIDE 1500 MG: 1.5 INJECTION, POWDER, LYOPHILIZED, FOR SOLUTION INTRAVENOUS at 10:06

## 2025-06-10 RX ADMIN — TRAMADOL HYDROCHLORIDE 50 MG: 50 TABLET, COATED ORAL at 06:06

## 2025-06-10 RX ADMIN — MIDAZOLAM 2 MG: 1 INJECTION INTRAMUSCULAR; INTRAVENOUS at 07:06

## 2025-06-10 RX ADMIN — ACETAMINOPHEN 500 MG: 500 TABLET ORAL at 10:06

## 2025-06-10 RX ADMIN — PROPOFOL 75 MG: 10 INJECTION, EMULSION INTRAVENOUS at 07:06

## 2025-06-10 RX ADMIN — BUPIVACAINE HYDROCHLORIDE 1.7 ML: 7.5 INJECTION, SOLUTION EPIDURAL; RETROBULBAR at 07:06

## 2025-06-10 RX ADMIN — KETOROLAC TROMETHAMINE 10 MG: 10 TABLET, FILM COATED ORAL at 06:06

## 2025-06-10 RX ADMIN — ACETAMINOPHEN 1000 MG: 500 TABLET ORAL at 06:06

## 2025-06-10 RX ADMIN — PROPOFOL 85 MCG/KG/MIN: 10 INJECTION, EMULSION INTRAVENOUS at 07:06

## 2025-06-10 NOTE — PLAN OF CARE
06/10/25 1255   Discharge Assessment   Assessment Type Discharge Planning Assessment   Source of Information patient;family   Communicated TA with patient/caregiver Yes   People in Home alone   Do you expect to return to your current living situation? Yes   Do you have help at home or someone to help you manage your care at home? Yes   Who are your caregiver(s) and their phone number(s)? friend - Hayde [phone]   Prior to hospitilization cognitive status: Alert/Oriented   Current cognitive status: Alert/Oriented   Walking or Climbing Stairs Difficulty yes   Walking or Climbing Stairs ambulation difficulty, requires equipment   Dressing/Bathing Difficulty yes   Dressing/Bathing bathing difficulty, requires equipment;dressing difficulty, assistance 1 person;dressing difficulty, requires equipment   Equipment Currently Used at Home none   Readmission within 30 days? No   Patient currently being followed by outpatient case management? No   Do you currently have service(s) that help you manage your care at home? No   Do you take prescription medications? Yes   Do you have prescription coverage? Yes   Do you have any problems affording any of your prescribed medications? No   Is the patient taking medications as prescribed? yes   Who is going to help you get home at discharge? friend   How do you get to doctors appointments? car, drives self   Are you on dialysis? No   Do you take coumadin? No   Discharge Plan A Home with family   Discharge Plan B Home with family   DME Needed Upon Discharge  walker, rolling   Discharge Plan discussed with: Patient   Transition of Care Barriers Mobility   Physical Activity   On average, how many days per week do you engage in moderate to strenuous exercise (like a brisk walk)? 0 days   On average, how many minutes do you engage in exercise at this level? 0 min   Financial Resource Strain   How hard is it for you to pay for the very basics like food, housing, medical care, and heating?  Not hard   Housing Stability   In the last 12 months, was there a time when you were not able to pay the mortgage or rent on time? N   At any time in the past 12 months, were you homeless or living in a shelter (including now)? N   Transportation Needs   In the past 12 months, has lack of transportation kept you from medical appointments or from getting medications? no   In the past 12 months, has lack of transportation kept you from meetings, work, or from getting things needed for daily living? No   Food Insecurity   Within the past 12 months, you worried that your food would run out before you got the money to buy more. Never true   Within the past 12 months, the food you bought just didn't last and you didn't have money to get more. Never true   Stress   Do you feel stress - tense, restless, nervous, or anxious, or unable to sleep at night because your mind is troubled all the time - these days? Not at all   Social Isolation   How often do you feel lonely or isolated from those around you?  Never   Alcohol Use   Q1: How often do you have a drink containing alcohol? Pt Declined   Q2: How many drinks containing alcohol do you have on a typical day when you are drinking? Pt Declined   Q3: How often do you have six or more drinks on one occasion? Pt Declined   Utilities   In the past 12 months has the electric, gas, oil, or water company threatened to shut off services in your home? No   Health Literacy   How often do you need to have someone help you when you read instructions, pamphlets, or other written material from your doctor or pharmacy? Never     No change in dcp.   Faxed updated into [employer] @ [employer]. Appt scheduled for 6/13.   [employer] delivered RW to room.     Pt DID participate in preop exercise regimen.

## 2025-06-10 NOTE — ANESTHESIA PREPROCEDURE EVALUATION
"                                                                                                             06/10/2025  Barbara Alvarez is a 65 y.o., female with left knee pain and osteoarthritis for TKA.  Other medical problems noted as listed here in chart.  She has held Plavix as per instructed.  She denies cardiopulmonary complaints today.  She has been seen and optimized by cardiology, low risk.  Left ventricle function is normal.    "Barbara Alvarez presents today for preoperative evaluation for right total knee arthroplasty with Contreras robotic assistance.  She has failed to improve despite conservative treatments including rest, activity modification, anti-inflammatories use limited to Tylenol given blood thinner, and steroid injections.  I reviewed the indications for surgery. The risks and benefits of the proposed and alternative treatments were discussed with the patient. Questions pertinent to the procedure were solicited and answered. Dr. Mckeon was available to answer any questions with instruction to call clinic with any further questions.No assurances were given. Informed consent was obtained. The patient expressed good understanding and wished to proceed with scheduling the procedure.      Of note, patient has a history of recurrent blood clots for which she is currently on Plavix as she has not been able to tolerate other blood thinners.  This led to her having an IVC filter placed as well.  She states she does tolerate aspirin if needed to be added.  She also had a left groin stent placed couple years ago by Dr. Omer.  She states she has some residual intermittent swelling in the left lower extremity secondary to this.  She denies any numbness or tingling today.  She also has a known history of breast cancer for which she states she has been in remission.  Not currently on any maintenance therapy   ...  Past Surgical History:   Procedure Laterality Date    APPENDECTOMY        CHOLECYSTECTOMY        " "RENETTA FILTER PLACEMENT         2015    HYSTERECTOMY        KNEE SURGERY Left      MASTECTOMY, PARTIAL Left 7/22/2024     Procedure: MASTECTOMY, PARTIAL - left  w/ radiological marker;  Surgeon: Deirdre Welch MD;  Location: [facility];  Service: General;  Laterality: Left;    NECK SURGERY        PLACEMENT, INFERIOR VENA CAVA FILTER Left      SHOULDER ARTHROSCOPY W/ ROTATOR CUFF REPAIR Left      STENT PLACEMENT/PRIOR TO CALIN Left 2015              Past Medical History:   Diagnosis Date    Breast cancer      Depression      History of blood clots      Hypertension      Lipoma of anterior chest wall      ...    Plan:  Plan for left total knee arthroplasty with Contreras robotic assistance at [facility]. The patient has been given preoperative instructions. Post-operative appointment is scheduled for 2 weeks.  Patient was given knee exercises for preop rehab.  Patient will need CT of the operative knee for preoperative surgical planning.  Given her history of blood clots we will plan to resume her Plavix and add 81 mg baby aspirin starting POD 1.  No TXA to be given preoperatively.  Strict precautions were given to notify clinic if any signs or symptoms of a DVT or PE develop. "      Pre-op Assessment    I have reviewed the Patient Summary Reports.     I have reviewed the Nursing Notes. I have reviewed the NPO Status.   I have reviewed the Medications.     Review of Systems  Anesthesia Hx:  No problems with previous Anesthesia             Denies Family Hx of Anesthesia complications.    Denies Personal Hx of Anesthesia complications.                    Hematology/Oncology:       -- Anemia:                    --  Cancer in past history:                     Cardiovascular:  Exercise tolerance: good   Hypertension       Denies Angina.         Hx of blood clots, on Plavix (held for 7 days)                           Pulmonary:  Pulmonary Normal      Denies Shortness of breath.                  Endocrine:  Diabetes         Obesity / " BMI > 30  Psych:  Psychiatric History  depression                Physical Exam  General: Well nourished, Cooperative, Alert and Oriented    Airway:  Mallampati: II   Mouth Opening: Normal  TM Distance: Normal  Tongue: Normal  Neck ROM: Normal ROM  Neck: Girth Increased    Dental:  Intact    Chest/Lungs:  Normal Respiratory Rate    Heart:  Rate: Normal  Rhythm: Regular Rhythm        Anesthesia Plan  Type of Anesthesia, risks & benefits discussed:    Anesthesia Type: Spinal, Gen Natural Airway  Intra-op Monitoring Plan: Standard ASA Monitors  Post Op Pain Control Plan: multimodal analgesia and peripheral nerve block  Informed Consent: Informed consent signed with the Patient and all parties understand the risks and agree with anesthesia plan.  All questions answered.   ASA Score: 3  Day of Surgery Review of History & Physical: H&P Update referred to the surgeon/provider.    Ready For Surgery From Anesthesia Perspective.     .

## 2025-06-10 NOTE — ANESTHESIA PROCEDURE NOTES
Peripheral Block    Patient location during procedure: pre-op   Block not for primary anesthetic.  Reason for block: at surgeon's request and post-op pain management   Post-op Pain Location: left knee   Start time: 6/10/2025 9:26 AM  Timeout: 6/10/2025 9:25 AM   End time: 6/10/2025 9:28 AM    Staffing  Authorizing Provider: Bulmaro Escoto MD  Performing Provider: Bulmaro Escoto MD    Staffing  Performed by: Bulmaro Escoto MD  Authorized by: Bulmaro Escoto MD    Preanesthetic Checklist  Completed: patient identified, IV checked, site marked, risks and benefits discussed, surgical consent, monitors and equipment checked, pre-op evaluation and timeout performed  Peripheral Block  Patient position: supine  Prep: ChloraPrep  Patient monitoring: heart rate, cardiac monitor, continuous pulse ox, continuous capnometry and frequent blood pressure checks  Block type: adductor canal  Laterality: left  Injection technique: single shot  Needle  Needle type: Stimuplex   Needle gauge: 21 G  Needle length: 4 in  Needle localization: anatomical landmarks and ultrasound guidance   -ultrasound image captured on disc.  Assessment  Injection assessment: negative aspiration, negative parasthesia and local visualized surrounding nerve  Paresthesia pain: none  Heart rate change: no  Slow fractionated injection: yes    Medications:    Medications: bupivacaine (pf) (MARCAINE) injection 0.25% - Perineural   20 mL - 6/10/2025 9:28:00 AM    Additional Notes  VSS.  [facility] RN monitoring vitals throughout procedure.  Patient tolerated procedure well.     Plus 10cc Exparel injected

## 2025-06-10 NOTE — PLAN OF CARE
Problem: Adult Inpatient Plan of Care  Goal: Plan of Care Review  Outcome: Ongoing  Goal: Patient-Specific Goal (Individualized)  Outcome: Ongoing  Goal: Absence of Hospital-Acquired Illness or Injury  Outcome: Ongoing  Goal: Optimal Comfort and Wellbeing  Outcome: Ongoing  Goal: Readiness for Transition of Care  Outcome: Ongoing     Problem: Diabetes Comorbidity  Goal: Blood Glucose Level Within Targeted Range  Outcome: Ongoing     Problem: Wound  Goal: Optimal Coping  Outcome: Ongoing  Goal: Optimal Functional Ability  Outcome: Ongoing  Goal: Absence of Infection Signs and Symptoms  Outcome: Ongoing  Goal: Improved Oral Intake  Outcome: Ongoing  Goal: Optimal Pain Control and Function  Outcome: Ongoing  Goal: Skin Health and Integrity  Outcome: Ongoing  Goal: Optimal Wound Healing  Outcome: Ongoing     Problem: Infection  Goal: Absence of Infection Signs and Symptoms  Outcome: Ongoing     Problem: Knee Arthroplasty  Goal: Optimal Coping  Outcome: Ongoing  Goal: Absence of Bleeding  Outcome: Ongoing  Goal: Effective Bowel Elimination  Outcome: Ongoing  Goal: Fluid and Electrolyte Balance  Outcome: Ongoing  Goal: Optimal Functional Ability  Outcome: Ongoing  Goal: Absence of Infection Signs and Symptoms  Outcome: Ongoing  Goal: Intact Neurovascular Status  Outcome: Ongoing  Goal: Anesthesia/Sedation Recovery  Outcome: Ongoing  Goal: Optimal Pain Control and Function  Outcome: Ongoing  Goal: Nausea and Vomiting Relief  Outcome: Ongoing  Goal: Effective Urinary Elimination  Outcome: Ongoing  Goal: Effective Oxygenation and Ventilation  Outcome: Ongoing     Problem: Fall Injury Risk  Goal: Absence of Fall and Fall-Related Injury  Outcome: Ongoing     Problem: Pain Acute  Goal: Optimal Pain Control and Function  Outcome: Ongoing

## 2025-06-10 NOTE — OP NOTE
DATE OF PROCEDURE:   06/10/2025    SURGEON:  Kee Mckeon M.D.    ASSISTANT: MILENA Blanca     ASSISTANT ATTESTATION: PA was necessary and essential for all aspects of the operation, including but no limited to patient positioning, surgical exposure, bony preparation, implantation, wound closure, and dressing placement.      Hospital: [facility]     PREOPERATIVE DIAGNOSIS:  Arthritis, Left knee.     POSTOPERATIVE DIAGNOSIS:  Arthritis, Left knee.     PROCEDURES PERFORMED:  Robotically-assisted left total knee arthroplasty.     ANESTHESIA: spinal    IV FLUIDS: Per Anesthesia    ESTIMATED BLOOD LOSS:  < 50cc     COUNTS:  Correct.    COMPLICATIONS:  None.    IMPLANTS:   Implant Name Type Inv. Item Serial No.  Lot No. LRB No. Used Action   PIN BONE 3.2Y232PV - [id number]  PIN BONE 3.1P187WT  [employer].  Left 1 Implanted and Explanted   PIN BONE 4 X 140MM STERILE - [id number]  PIN BONE 4 X 140MM STERILE  [employer]  Left 1 Implanted and Explanted   CMPNT FEM 3 KN LT CRCTE RTN - [id number]  CMPNT FEM 3 KN LT CRCTE RTN  [employer]. [id number] Left 1 Implanted   INSERT TIBIAL CS SIZE 4 9MM - [id number]  INSERT TIBIAL CS SIZE 4 9MM  [employer]. [id number] Left 1 Implanted   BASEPLATE TIBIAL TRIATHLON SZ - [id number]  BASEPLATE TIBIAL TRIATHLON SZ  [employer]. [id number] Left 1 Implanted       CONDITION: Stable to PACU.        INDICATIONS FOR PROCEDURE:    Barbara Alvarez is a 65 y.o. year old female with continued pain and loss of motion of the left knee. The patient has failed conservative treatments and has been followed in my clinic. The risks, benefits, and alternatives were discussed with the patient in detail. All questions were answered. Informed consent was obtained.       PROCEDURE IN DETAIL:  Patient was found in preoperative holding by Anesthesia and found fit for surgery. The patient was taken to the operating room and placed  on the operating table in supine position. All bony prominences were well padded. Timeout was called to identify correct patient, correct procedure, and correct site, and all were in agreement. The patient underwent spinal anesthesia without complications. The patient was then prepped and draped in normal sterile fashion, leaving the left lower extremity exposed for surgery. A well-padded tourniquet was placed on left upper thigh. Approximate tourniquet time was 45 minutes at 300. The patient received preoperative antibiotics.     After exsanguination of left lower extremity, tourniquet was inflated. A 15 cm anterior incision was made over the left knee, soft tissue dissection down to the fascia, where patient had a medial parapatellar arthrotomy. The knee was then flexed and patella was everted. Remaining fat pad and meniscus were removed. The patient had severe bone-on-bone arthritis of the medial compartment and patellofemoral joint. Patient had mild varus deformity.     Next, the 2 femoral and tibial pins were then placed to allow communication with the [employer] robotic machine. The knee was then registered with 40 trigger points both on the femoral and tibial side. Next, patient underwent soft tissue balancing, both mediolaterally in and flexion extension, evenly out to approximately 18 mm. After this was done, the [employer] robotic machine was brought in. The distal femoral cut, anterior, posterior, and chamfer cuts were then made. The proximal tibial cut was then made. Next, trialing with a 3 femur and a 4 tibia demonstrated 9 poly with full extension. The patient was able to gravity flex to 125 degrees of gravity flexion. Patella did not require resurfacing. After this was done, the trialing components were removed. The bone was then copiously irrigated and dried. The actual components were then press fit into place. The knee was placed in extension. Tourniquet was released. Hemostasis was achieved. Copious  irrigation was used to wash the wound. The patient's motion was 125 degrees of flexion. The patient was stable to stressing and patellofemoral tracking was appropriate.     After this was done, copious irrigation was used to wash the wound. The fascia was closed with 0 Ethibond, subcutaneous tissue closed with 2-0 Vicryl. Skin was closed with skin staples. Xeroform, 4 x 4's, soft tissue dressing, Ace wrap was placed over the left lower extremity. The patient was then awoken by Anesthesia and brought to PACU in stable condition.

## 2025-06-10 NOTE — ANESTHESIA PROCEDURE NOTES
Spinal    Diagnosis: Osteoarthritis  Patient location during procedure: OR  Start time: 6/10/2025 7:14 AM  Timeout: 6/10/2025 7:12 AM  End time: 6/10/2025 7:17 AM    Staffing  Authorizing Provider: Bulmaro Escoto MD  Performing Provider: Bulmaro Escoto MD    Staffing  Performed by: Bulmaro Escoto MD  Authorized by: Bulmaro Escoto MD    Preanesthetic Checklist  Completed: patient identified, IV checked, site marked, risks and benefits discussed, surgical consent, monitors and equipment checked, pre-op evaluation and timeout performed  Spinal Block  Patient position: sitting  Prep: ChloraPrep  Patient monitoring: heart rate, continuous pulse ox and frequent blood pressure checks  Approach: midline  Location: L3-4  Injection technique: single shot  CSF Fluid: clear free-flowing CSF  Needle  Needle type: pencil-tip   Needle gauge: 25 G  Needle length: 3.5 in  Additional Documentation: incremental injection and negative aspiration for heme  Needle localization: anatomical landmarks  Assessment  Ease of block: easy  Patient's tolerance of the procedure: comfortable throughout block and no complaints  Medications:    Medications: bupivacaine (pf) (MARCAINE) injection 0.75% - Intraspinal   1.7 mL - 6/10/2025 7:17:00 AM

## 2025-06-10 NOTE — TRANSFER OF CARE
"Anesthesia Transfer of Care Note    Patient: Barbara Alvarez    Procedure(s) Performed: Procedure(s) (LRB):  ROBOTIC ARTHROPLASTY, KNEE, TOTAL (Left)    Patient location: PACU    Anesthesia Type: MAC and spinal    Transport from OR: Transported from OR on room air with adequate spontaneous ventilation    Post pain: adequate analgesia    Post assessment: no apparent anesthetic complications    Post vital signs: stable    Level of consciousness: sedated    Nausea/Vomiting: no nausea/vomiting    Complications: none    Transfer of care protocol was followed      Last vitals: Visit Vitals  BP (!) 154/99 (BP Location: Left arm, Patient Position: Lying)   Pulse 78   Temp 36.2 °C (97.1 °F) (Tympanic)   Resp 20   Ht 5' 7" (1.702 m)   Wt 104.3 kg (229 lb 15 oz)   SpO2 99%   Breastfeeding No   BMI 36.01 kg/m²     "

## 2025-06-10 NOTE — BRIEF OP NOTE
[facility] - Orthopaedics  Brief Operative Note    SUMMARY     Surgery Date: 6/10/2025     Surgeons and Role:     * Kee Mckeon MD - Primary    Assisting Surgeon: None    Pre-op Diagnosis:  Pre-op exam [Z01.818]  Osteoarthritis of left knee, unspecified osteoarthritis type [M17.12]    Post-op Diagnosis:  Post-Op Diagnosis Codes:     * Pre-op exam [Z01.818]     * Osteoarthritis of left knee, unspecified osteoarthritis type [M17.12]    Procedure(s) (LRB):  ROBOTIC ARTHROPLASTY, KNEE, TOTAL (Left)    Anesthesia: Spinal    Implants:  Implant Name Type Inv. Item Serial No.  Lot No. LRB No. Used Action   CMPNT FEM 3 KN LT CRCTE RTN - [id number]  CMPNT FEM 3 KN LT CRCTE RTN  [employer]. [id number] Left 1 Implanted   INSERT TIBIAL CS SIZE 4 9MM - [id number]  INSERT TIBIAL CS SIZE 4 9MM  [employer]. [id number] Left 1 Implanted   BASEPLATE TIBIAL TRIATHLON SZ - [id number]  BASEPLATE TIBIAL TRIATHLON SZ  [employer]. [id number] Left 1 Implanted       Operative Findings: L TKA    Estimated Blood Loss: * No values recorded between 6/10/2025  7:09 AM and 6/10/2025  9:02 AM *    Estimated Blood Loss has been documented.         Specimens:   Specimen (24h ago, onward)      None          * No specimens in log *    [id number]

## 2025-06-10 NOTE — PT/OT/SLP EVAL
Physical Therapy Evaluation    Patient Name:  Barbara Alvarez   MRN:  [id number]    Recommendations:     Discharge Recommendations: Low Intensity Therapy   Discharge Equipment Recommendations: walker, rolling   Barriers to discharge: None    Assessment:     Barbara Alvarez is a 65 y.o. female admitted with a medical diagnosis of Osteoarthritis of left knee.  She presents with the following impairments/functional limitations: weakness, impaired endurance, impaired functional mobility, decreased lower extremity function, pain, decreased ROM, edema, orthopedic precautions. Pt with orthostatic episode upon arrival to room at 1313 with NSG and CNA in restroom, assisted with transfer back to bed while NSG staff was present for assisting with patient. Spoke with ANEUDY Maier at 1604 and she would like therapy to try to attempt evaluation with patient with caution.    Rehab Prognosis: Fair; patient would benefit from acute skilled PT services to address these deficits and reach maximum level of function.    Recent Surgery: Procedure(s) (LRB):  ROBOTIC ARTHROPLASTY, KNEE, TOTAL (Left) * Day of Surgery *    Plan:     During this hospitalization, patient to be seen BID to address the identified rehab impairments via gait training, therapeutic activities, therapeutic exercises and progress toward the following goals:    Plan of Care Expires:  06/16/25    Subjective     Chief Complaint: L knee pain  Patient/Family Comments/goals:   Pain/Comfort:  Location - Side 1: Left  Location 1: knee  Pain Addressed 1: Pre-medicate for activity, Reposition, Distraction    Patients cultural, spiritual, Restoration conflicts given the current situation:      Living Environment:  Pt lives in single story home alone typically, she has a ramp to enter home.  Prior to admission, patients level of function was ind.  Equipment used at home: none.  DME owned (not currently used): rollator.  Upon discharge, patient will have assistance from  friends.    Objective:     Communicated with nurse prior to session.  Patient found supine with peripheral IV, telemetry  upon PT entry to room.    General Precautions: Standard, fall  Orthopedic Precautions:LLE weight bearing as tolerated   Braces: N/A  Respiratory Status: Room air    Exams:  RLE ROM: WFL  RLE Strength: WFL  LLE ROM:     (In degrees) AROM PROM   L knee flexion 60 70   L knee extension 0       LLE Strength: NT dt sx side    Functional Mobility:  Bed Mobility:     Supine to Sit: stand by assistance  Transfers:     Sit to Stand:  contact guard assistance with rolling walker  Gait: Pt ambulated 35 ft w rw and CGA, using step through gait pattern at slow pace. Pt unable to cont further due to slight dizziness. BP: 130/70, HR: 77 Nsg notified            Treatment & Education:  Pt edu on total knee protocol and importance of frequent mobility  Pt completed prehab prior to sx    Patient left up in chair with all lines intact, call button in reach, nurse notified, and godchild present.    GOALS:   Multidisciplinary Problems       Physical Therapy Goals          Problem: Physical Therapy    Goal Priority Disciplines Outcome Interventions   Physical Therapy Goal     PT, PT/OT Progressing    Description: Pt will improve functional independence by performing:    Bed mobility: SBA  Sit to stand: SBA with rolling walker  Bed to chair: SBA with Stand Step  with rolling walker   Car Transfer: SBA with rolling walker  Ambulation x 200'  feet with SBA and rolling walker  1 Step (Curb): Min A  and rolling walker  Ramp: contact guard assistance with rolling walker  left knee AROM flexion (in degrees): 90  left knee AROM extension (in degrees): 0   Independent with total knee HEP                          DME Justifications:   Barbara's mobility limitation cannot be sufficiently resolved by the use of a cane. Her functional mobility deficit can be sufficiently resolved with the use of a Rolling Walker. Patient's mobility  limitation significantly impairs their ability to participate in one of more activities of daily living.  The use of a RW will significantly improve the patient's ability to participate in MRADLS and the patient will use it on regular basis in the home.    History:     Past Medical History:   Diagnosis Date    Arthritis     Breast cancer     left    Depression     Digestive disorder     History of blood clots     Hypertension     Lipoma of anterior chest wall        Past Surgical History:   Procedure Laterality Date    APPENDECTOMY      CERVICAL SPINE SURGERY      fusion---over 10 years ago    CHOLECYSTECTOMY      HYSTERECTOMY      KNEE ARTHROSCOPY W/ MENISCAL REPAIR Left     MASTECTOMY, PARTIAL Left 07/22/2024    MASTECTOMY, PARTIAL - left  w/ radiological marker; MD Rebekah;    PLACEMENT, INFERIOR VENA CAVA FILTER Left 2015    SHOULDER ARTHROSCOPY W/ ROTATOR CUFF REPAIR Left     STENT PLACEMENT/PRIOR TO CALIN Left 2015    2 stents in lt groin       Time Tracking:     PT Received On:    PT Start Time: 1605     PT Stop Time: 1633  PT Total Time (min): 28 min     Billable Minutes: Evaluation 28      06/10/2025

## 2025-06-10 NOTE — PLAN OF CARE
Problem: Physical Therapy  Goal: Physical Therapy Goal  Description: Pt will improve functional independence by performing:    Bed mobility: SBA  Sit to stand: SBA with rolling walker  Bed to chair: SBA with Stand Step  with rolling walker   Car Transfer: SBA with rolling walker  Ambulation x 200'  feet with SBA and rolling walker  1 Step (Curb): Min A  and rolling walker  Ramp: contact guard assistance with rolling walker  left knee AROM flexion (in degrees): 90  left knee AROM extension (in degrees): 0   Independent with total knee HEP     Outcome: Progressing

## 2025-06-10 NOTE — PROGRESS NOTES
Patient experiencing a delayed response to anesthesia. Will convert to observation status, Will initiate our pre-emptive multimodal pain mgt protocol, provide post-anesthesia monitoring and perform frequent pain assessments. Will plan for discharge once the patient is tolerating pain and pain medications appropriately and the patient has been cleared from a safety/mobility standpoint.     Co-morbidities mgt:   Hypertension - Home meds restarted, monitor closely and adjust plan of care accordingly.     Hx VTE/PE/DVT - SCDs, EVELYN hose, Patient has had severe allergies to Lovenox, Eliquis, Xarelto and Coumadin. Will be forced to use Plavix/ASA for DVT prophylaxis, Early ambulation. Patient has Nina filter.     Obesity - BMI 36    Anxiety/Depression - home meds resumed

## 2025-06-11 VITALS
SYSTOLIC BLOOD PRESSURE: 149 MMHG | OXYGEN SATURATION: 98 % | HEIGHT: 67 IN | DIASTOLIC BLOOD PRESSURE: 75 MMHG | TEMPERATURE: 98 F | RESPIRATION RATE: 18 BRPM | BODY MASS INDEX: 36.09 KG/M2 | WEIGHT: 229.94 LBS | HEART RATE: 87 BPM

## 2025-06-11 LAB
ANION GAP SERPL CALC-SCNC: 6 MEQ/L
BUN SERPL-MCNC: 15.1 MG/DL (ref 9.8–20.1)
CALCIUM SERPL-MCNC: 8.5 MG/DL (ref 8.4–10.2)
CHLORIDE SERPL-SCNC: 111 MMOL/L (ref 98–107)
CO2 SERPL-SCNC: 26 MMOL/L (ref 23–31)
CREAT SERPL-MCNC: 0.79 MG/DL (ref 0.55–1.02)
CREAT/UREA NIT SERPL: 19
ERYTHROCYTE [DISTWIDTH] IN BLOOD BY AUTOMATED COUNT: 12.5 % (ref 11.5–17)
GFR SERPLBLD CREATININE-BSD FMLA CKD-EPI: >60 ML/MIN/1.73/M2
GLUCOSE SERPL-MCNC: 138 MG/DL (ref 82–115)
HCT VFR BLD AUTO: 30 % (ref 37–47)
HGB BLD-MCNC: 9.4 G/DL (ref 12–16)
MCH RBC QN AUTO: 27.6 PG (ref 27–31)
MCHC RBC AUTO-ENTMCNC: 31.3 G/DL (ref 33–36)
MCV RBC AUTO: 88 FL (ref 80–94)
NRBC BLD AUTO-RTO: 0 %
PLATELET # BLD AUTO: 272 X10(3)/MCL (ref 130–400)
PMV BLD AUTO: 10.2 FL (ref 7.4–10.4)
POTASSIUM SERPL-SCNC: 3.3 MMOL/L (ref 3.5–5.1)
RBC # BLD AUTO: 3.41 X10(6)/MCL (ref 4.2–5.4)
SODIUM SERPL-SCNC: 143 MMOL/L (ref 136–145)
WBC # BLD AUTO: 8.44 X10(3)/MCL (ref 4.5–11.5)

## 2025-06-11 PROCEDURE — 97116 GAIT TRAINING THERAPY: CPT | Mod: CQ

## 2025-06-11 PROCEDURE — 63600175 PHARM REV CODE 636 W HCPCS

## 2025-06-11 PROCEDURE — 25000003 PHARM REV CODE 250

## 2025-06-11 PROCEDURE — 36415 COLL VENOUS BLD VENIPUNCTURE: CPT

## 2025-06-11 PROCEDURE — G0378 HOSPITAL OBSERVATION PER HR: HCPCS

## 2025-06-11 PROCEDURE — 80048 BASIC METABOLIC PNL TOTAL CA: CPT

## 2025-06-11 PROCEDURE — 94761 N-INVAS EAR/PLS OXIMETRY MLT: CPT

## 2025-06-11 PROCEDURE — 97110 THERAPEUTIC EXERCISES: CPT | Mod: CQ

## 2025-06-11 PROCEDURE — 94799 UNLISTED PULMONARY SVC/PX: CPT

## 2025-06-11 PROCEDURE — 25000003 PHARM REV CODE 250: Performed by: NURSE PRACTITIONER

## 2025-06-11 PROCEDURE — 97530 THERAPEUTIC ACTIVITIES: CPT | Mod: CQ

## 2025-06-11 PROCEDURE — 99900031 HC PATIENT EDUCATION (STAT)

## 2025-06-11 PROCEDURE — 85027 COMPLETE CBC AUTOMATED: CPT

## 2025-06-11 RX ORDER — POTASSIUM CHLORIDE 20 MEQ/1
40 TABLET, EXTENDED RELEASE ORAL ONCE
Status: COMPLETED | OUTPATIENT
Start: 2025-06-11 | End: 2025-06-11

## 2025-06-11 RX ORDER — CEFADROXIL 500 MG/1
500 CAPSULE ORAL EVERY 12 HOURS
Qty: 14 CAPSULE | Refills: 0 | Status: SHIPPED | OUTPATIENT
Start: 2025-06-11 | End: 2025-06-18

## 2025-06-11 RX ORDER — NAPROXEN SODIUM 220 MG/1
81 TABLET, FILM COATED ORAL DAILY
Qty: 42 TABLET | Refills: 0 | Status: SHIPPED | OUTPATIENT
Start: 2025-06-11 | End: 2025-07-23

## 2025-06-11 RX ORDER — ACETAMINOPHEN 500 MG
500 TABLET ORAL EVERY 4 HOURS
Qty: 84 TABLET | Refills: 0 | Status: SHIPPED | OUTPATIENT
Start: 2025-06-11 | End: 2025-06-25

## 2025-06-11 RX ORDER — POLYETHYLENE GLYCOL 3350 17 G/17G
17 POWDER, FOR SOLUTION ORAL DAILY
Qty: 14 EACH | Refills: 0 | Status: SHIPPED | OUTPATIENT
Start: 2025-06-11 | End: 2025-06-25

## 2025-06-11 RX ORDER — TRAMADOL HYDROCHLORIDE 50 MG/1
50 TABLET, FILM COATED ORAL
Qty: 42 TABLET | Refills: 0 | Status: SHIPPED | OUTPATIENT
Start: 2025-06-11 | End: 2025-06-18

## 2025-06-11 RX ADMIN — ACETAMINOPHEN 500 MG: 500 TABLET ORAL at 02:06

## 2025-06-11 RX ADMIN — ASPIRIN 81 MG: 81 TABLET, CHEWABLE ORAL at 08:06

## 2025-06-11 RX ADMIN — ACETAMINOPHEN 500 MG: 500 TABLET ORAL at 01:06

## 2025-06-11 RX ADMIN — CEFAZOLIN 2 G: 2 INJECTION, POWDER, FOR SOLUTION INTRAMUSCULAR; INTRAVENOUS at 02:06

## 2025-06-11 RX ADMIN — TRAMADOL HYDROCHLORIDE 50 MG: 50 TABLET, COATED ORAL at 01:06

## 2025-06-11 RX ADMIN — ACETAMINOPHEN 500 MG: 500 TABLET ORAL at 05:06

## 2025-06-11 RX ADMIN — METOCLOPRAMIDE 10 MG: 5 INJECTION, SOLUTION INTRAMUSCULAR; INTRAVENOUS at 12:06

## 2025-06-11 RX ADMIN — FAMOTIDINE 20 MG: 20 TABLET, FILM COATED ORAL at 05:06

## 2025-06-11 RX ADMIN — METOCLOPRAMIDE 10 MG: 5 INJECTION, SOLUTION INTRAMUSCULAR; INTRAVENOUS at 06:06

## 2025-06-11 RX ADMIN — POTASSIUM CHLORIDE 40 MEQ: 1500 TABLET, EXTENDED RELEASE ORAL at 08:06

## 2025-06-11 RX ADMIN — METOCLOPRAMIDE 10 MG: 5 INJECTION, SOLUTION INTRAMUSCULAR; INTRAVENOUS at 01:06

## 2025-06-11 RX ADMIN — DOCUSATE SODIUM 200 MG: 100 CAPSULE, LIQUID FILLED ORAL at 05:06

## 2025-06-11 RX ADMIN — CLOPIDOGREL BISULFATE 75 MG: 75 TABLET, FILM COATED ORAL at 08:06

## 2025-06-11 RX ADMIN — ACETAMINOPHEN 500 MG: 500 TABLET ORAL at 11:06

## 2025-06-11 RX ADMIN — SENNOSIDES AND DOCUSATE SODIUM 2 TABLET: 50; 8.6 TABLET ORAL at 08:06

## 2025-06-11 RX ADMIN — TRAMADOL HYDROCHLORIDE 50 MG: 50 TABLET, COATED ORAL at 11:06

## 2025-06-11 RX ADMIN — TRAMADOL HYDROCHLORIDE 50 MG: 50 TABLET, COATED ORAL at 05:06

## 2025-06-11 NOTE — PLAN OF CARE
Problem: Physical Therapy  Goal: Physical Therapy Goal  Description: Pt will improve functional independence by performing:    Bed mobility: SBA MET  Sit to stand: SBA with rolling walker MET  Bed to chair: SBA with Stand Step  with rolling walker   Car Transfer: SBA with rolling walker MET  Ambulation x 200'  feet with SBA and rolling walker  1 Step (Curb): Min A  and rolling walker MET  Ramp: contact guard assistance with rolling walker MET  left knee AROM flexion (in degrees): 90  left knee AROM extension (in degrees): 0   Independent with total knee HEP     Outcome: Progressing

## 2025-06-11 NOTE — PLAN OF CARE
Problem: Physical Therapy  Goal: Physical Therapy Goal  Description: Pt will improve functional independence by performing:    Bed mobility: SBA  Sit to stand: SBA with rolling walker MET  Bed to chair: SBA with Stand Step  with rolling walker   Car Transfer: SBA with rolling walker MET  Ambulation x 200'  feet with SBA and rolling walker  1 Step (Curb): Min A  and rolling walker MET  Ramp: contact guard assistance with rolling walker MET  left knee AROM flexion (in degrees): 90  left knee AROM extension (in degrees): 0   Independent with total knee HEP     Outcome: Progressing

## 2025-06-11 NOTE — DISCHARGE SUMMARY
ADMIT DATE: 6/10/2025    DISCHARGE DATE: 6/11/2025  3:00 PM     DIAGNOSIS:  End-stage osteoarthritis, left knee.      OPERATIVE PROCEDURE:  Left total knee arthroplasty.      HOSPITAL COURSE: Barbara Alvarez is a 65 y.o.  old female who was admitted on the above date for the above procedure.  The patient tolerated surgery well, was back up to the orthopedic floor in stable condition.  Postoperatively, the patient is eating well, having normal bowel movements.  Pain is well controlled with p.o. pain medicine.  The patient has been followed by physical therapy, goals met.      PHYSICAL EXAM:  GENERAL:  The patient is a well nourished, well developed female.  The patient is awake, alert and oriented x3 and in no apparent distress.  The patient is pleasant and cooperative.   EXTREMITIES:  Examination of the left lower extremity compartments soft and warm.  Skin is intact.  No signs or symptoms of DVT or infection.  The incision is clean, dry, and intact.  The patient's motion is 0-100 degrees.  Stable to stressing and neurovascularly intact distally.      DISPOSITION:  Home with Outpatient Physical Therapy.      FOLLOWUP:     Follow-up Information       Kee Mckeon MD. Go on 6/18/2025.    Specialty: Orthopedic Surgery  Why: Ortho follow up appt on Wednesday 6/18/25 @ 9:45am w/ Meri (Dr Mckeon's Phy Assistant) at the [facility].  Contact information:  [address] [place] [address]  [phone]               [facility], [employer] Follow up.    Why: This is the equipment company. Call if you have questions or concerns.  Contact information:  [address] [facility] [place] [place] [address] [place] [id number] [phone]               [facility].    Specialty: Physical Therapy  Why: This is the outpatient therapy facility. They will contact pt with appt date & time. Call if you have questions or concerns.  Contact information:  [address] [facility].  [facility] [place] [address]  [facility] [place]  [address]  [phone]                                 MEDICATIONS:    Reconciled Home Medications:      Medication List        PAUSE taking these medications      diclofenac sodium 1 % Gel  Wait to take this until: June 25, 2025  Commonly known as: VOLTAREN  Apply topically as needed.            START taking these medications      acetaminophen 500 MG tablet  Commonly known as: TYLENOL  Take 1 tablet (500 mg total) by mouth every 4 (four) hours. for 14 days     aspirin 81 MG Chew  Take 1 tablet (81 mg total) by mouth once daily. Blood clot prevention     cefadroxil 500 MG Cap  Commonly known as: DURICEF  Take 1 capsule (500 mg total) by mouth every 12 (twelve) hours. for 7 days     polyethylene glycol 17 gram Pwpk  Commonly known as: GLYCOLAX  Take 17 g by mouth once daily. Constipation PREVENTION for 14 days     traMADoL 50 mg tablet  Commonly known as: ULTRAM  Take 1 tablet (50 mg total) by mouth every 4 to 6 hours as needed for Pain.            CONTINUE taking these medications      amLODIPine 10 MG tablet  Commonly known as: NORVASC  Take 10 mg by mouth nightly.     CALCIUM 500 ORAL  Take 1 tablet by mouth nightly.     clopidogreL 75 mg tablet  Commonly known as: PLAVIX  Take 75 mg by mouth once daily.     furosemide 40 MG tablet  Commonly known as: LASIX  Take 40 mg by mouth 2 (two) times a day.     irbesartan 300 MG tablet  Commonly known as: AVAPRO  Take 300 mg by mouth every evening.     mupirocin 2 % ointment  Commonly known as: BACTROBAN  Apply one application to bilateral nostrils twice a day for 5 days.                   DISCHARGE INSTRUCTIONS:  Patient instructed to remain weightbearing as tolerated to the left lower extremity.  Call or return to the emergency room immediately if any worsening conditions.  Patient voiced understanding.

## 2025-06-11 NOTE — NURSING
Patient AAOx4, nad. IV dc'd, catheter intact and gauze dressing applied. Medical education given and informed about fu appt. Verbalized understanding. Patient is being dc'd home via family's personal vehicle.       6/11/2025   2:36 PM    Nurse Note:       Prior to discharge, the Patient/Support Person was educated and was able to verbalize understanding on following:    [x] Yes   [] No   [] Further Education Provided    Knee Replacement Specific Education   Pain management, Medication Management and Prescriptions  Activity level  Orthopedic precautions/braces - N/A  Complication Prevention to include: Constipation, post-op urinary retention, pneumonia, bleeding, falls, infection, DVT prophylaxis and nausea vomiting  Wound care Instructions/Bandages provided  Next dose due for pain and complication prevention medications      Perception of Care - Joint Replacement Population Total Joint Surgery List: KNEE    Patient able to verbalize one way to treat/prevent SWELLING at home   [x] Yes   [] No   [] Further Education Provided      Attending Nurse:  HERNAN Torres

## 2025-06-11 NOTE — PT/OT/SLP PROGRESS
Physical Therapy Treatment    Patient Name:  Barbara Alvarez   MRN:  [id number]    Recommendations:     Discharge Recommendations: Low Intensity Therapy  Discharge Equipment Recommendations: walker, rolling  Barriers to discharge: None    Assessment:     Barbara Alvarez is a 65 y.o. female admitted with a medical diagnosis of Osteoarthritis of left knee.  She presents with the following impairments/functional limitations: weakness, impaired endurance, impaired functional mobility, decreased lower extremity function, pain, decreased ROM, edema, orthopedic precautions .    Rehab Prognosis: Good; patient would benefit from acute skilled PT services to address these deficits and reach maximum level of function.    Recent Surgery: Procedure(s) (LRB):  ROBOTIC ARTHROPLASTY, KNEE, TOTAL (Left) 1 Day Post-Op    Plan:     During this hospitalization, patient to be seen BID to address the identified rehab impairments via gait training, therapeutic activities, therapeutic exercises and progress toward the following goals:    Plan of Care Expires:  06/16/25    Subjective     Chief Complaint: L knee pain  Patient/Family Comments/goals:   Pain/Comfort:  Location - Side 1: Left  Location 1: knee  Pain Addressed 1: Pre-medicate for activity, Reposition, Distraction      Objective:     Communicated with nurse prior to session.  Patient found supine with peripheral IV, telemetry upon PT entry to room.     General Precautions: Standard, fall  Orthopedic Precautions: LLE weight bearing as tolerated  Braces: N/A  Respiratory Status: Room air     Functional Mobility:  Bed Mobility:     Supine to Sit: stand by assistance  Transfers:     Sit to Stand:  stand by assistance with rolling walker  Bed to Chair: stand by assistance with  rolling walker  using  Step Transfer  Car Transfer: stand by assistance with  rolling walker  using  Step Transfer  Gait: Pt ambulated 100 ft w rw and SBA, using step through gait pattern at slow pace  Stairs:  Pt  "ascended/descended 4" curb step with Rolling Walker with no handrails with Contact Guard Assistance.   Ramp: contact guard assistance with rolling walker      (In degrees) AROM PROM   L knee flexion 75 80   L knee extension 3 2        Treatment & Education:  Pt edu on total knee protocol and importance of frequent mobility    Patient left up in chair with all lines intact, call button in reach, nurse notified, and friend present..    GOALS:   Multidisciplinary Problems       Physical Therapy Goals          Problem: Physical Therapy    Goal Priority Disciplines Outcome Interventions   Physical Therapy Goal     PT, PT/OT Progressing    Description: Pt will improve functional independence by performing:    Bed mobility: SBA  Sit to stand: SBA with rolling walker MET  Bed to chair: SBA with Stand Step  with rolling walker   Car Transfer: SBA with rolling walker MET  Ambulation x 200'  feet with SBA and rolling walker  1 Step (Curb): Min A  and rolling walker MET  Ramp: contact guard assistance with rolling walker MET  left knee AROM flexion (in degrees): 90  left knee AROM extension (in degrees): 0   Independent with total knee HEP                          DME Justifications:   Barbara's mobility limitation cannot be sufficiently resolved by the use of a cane. Her functional mobility deficit can be sufficiently resolved with the use of a Rolling Walker. Patient's mobility limitation significantly impairs their ability to participate in one of more activities of daily living.  The use of a RW will significantly improve the patient's ability to participate in MRADLS and the patient will use it on regular basis in the home.    Time Tracking:     PT Received On:    PT Start Time: 0830     PT Stop Time: 0923  PT Total Time (min): 53 min     Billable Minutes: Gait Training 30 and Therapeutic Activity 23    Treatment Type: Treatment  PT/PTA: PT     Number of PTA visits since last PT visit: 0     06/11/2025  "

## 2025-06-11 NOTE — PT/OT/SLP PROGRESS
Physical Therapy Treatment    Patient Name:  Barbara Alvarez   MRN:  [id number]    Recommendations:     Discharge Recommendations: Low Intensity Therapy  Discharge Equipment Recommendations: walker, rolling  Barriers to discharge: None    Assessment:     Barbara Alvarez is a 65 y.o. female admitted with a medical diagnosis of Osteoarthritis of left knee.  She presents with the following impairments/functional limitations: weakness, impaired endurance, impaired functional mobility, decreased lower extremity function, pain, decreased ROM, edema, orthopedic precautions .    PT NOTE: BP assessed with pt sitting EOB: 132/70, HR 85, and standing at BP: 124/86, HR 96. Pt required increased time with all activities.    Rehab Prognosis: Good; patient would benefit from acute skilled PT services to address these deficits and reach maximum level of function.    Recent Surgery: Procedure(s) (LRB):  ROBOTIC ARTHROPLASTY, KNEE, TOTAL (Left) 1 Day Post-Op    Plan:     During this hospitalization, patient to be seen BID to address the identified rehab impairments via gait training, therapeutic activities, therapeutic exercises and progress toward the following goals:    Plan of Care Expires:  06/16/25    Subjective     Chief Complaint: pain in left anterior thigh and posterior left knee  Patient/Family Comments/goals: discharge home today  Pain/Comfort:  Pain Rating 1: 5/10  Location - Side 1: left  Location - Orientation 1: anterior  Location 1: thigh  Pain Addressed 1: Pre-medicate for activity, Reposition, Distraction  Pain Rating Post-Intervention 1: 7/10  Pain Rating 2: 5/10  Location - Side 2: left  Location - Orientation 2: posterior  Location 2: knee  Pain Addressed 2: Pre-medicate for activity, Reposition, Distraction  Pain Rating Post-Intervention 2: 7/10      Objective:     Communicated with nsg prior to session.  Patient found HOB elevated with   upon PT entry to room.     General Precautions: Standard, fall  Orthopedic  Precautions: LLE weight bearing as tolerated  Braces: N/A  Respiratory Status: Room air     Functional Mobility:  Bed Mobility:     Rolling Left:  stand by assistance  Rolling Right: stand by assistance  Scooting: stand by assistance  Bridging: stand by assistance  Supine to Sit: stand by assistance  Sit to Supine: stand by assistance  Transfers:     Sit to Stand:  stand by assistance with rolling walker  Gait: stand by assistance with RW, pt ambulated 50' with step-to, antalgic gait pattern, with slow fern. No LOB noted.    Treatment & Education:  Pt educated on importance of out of bed mobility, frequent ambulation, and fall prevention.  Pt educated on pursed lip breathing technique.  Pt educated on management of swelling management through RICE method.  Pt performed 10 reps of total knee HEPs with active assist of LLE.    Patient left HOB elevated with call button in reach and caregiver present..    GOALS:   Multidisciplinary Problems       Physical Therapy Goals          Problem: Physical Therapy    Goal Priority Disciplines Outcome Interventions   Physical Therapy Goal     PT, PT/OT Progressing    Description: Pt will improve functional independence by performing:    Bed mobility: SBA MET  Sit to stand: SBA with rolling walker MET  Bed to chair: SBA with Stand Step  with rolling walker   Car Transfer: SBA with rolling walker MET  Ambulation x 200'  feet with SBA and rolling walker  1 Step (Curb): Min A  and rolling walker MET  Ramp: contact guard assistance with rolling walker MET  left knee AROM flexion (in degrees): 90  left knee AROM extension (in degrees): 0   Independent with total knee HEP                          DME Justifications:   Barbara's mobility limitation cannot be sufficiently resolved by the use of a cane. Her functional mobility deficit can be sufficiently resolved with the use of a Rolling Walker. Patient's mobility limitation significantly impairs their ability to participate in one of  more activities of daily living.  The use of a RW will significantly improve the patient's ability to participate in MRADLS and the patient will use it on regular basis in the home.    Time Tracking:     PT Received On:    PT Start Time: 1313     PT Stop Time: 1355  PT Total Time (min): 42 min     Billable Minutes: Gait Training 10 min, Therapeutic Activity 17 min, and Therapeutic Exercise 15 min    Treatment Type: Treatment  PT/PTA: PTA     Number of PTA visits since last PT visit: 0     06/11/2025

## 2025-06-11 NOTE — PROGRESS NOTES
"Status post L TKA. POD # 1  No acute events overnight.    Pain controlled.    Resting in bed.   Ambulating with PT.    Vital Signs  Temp: 98.1 °F (36.7 °C)  Temp Source: Oral  Pulse: 78  Heart Rate Source: Monitor  Resp: 16  SpO2: 97 %  Pulse Oximetry Type: Intermittent  Oxygen Concentration (%): 80  Device (Oxygen Therapy): room air  BP: 126/64  BP Location: Left arm  BP Method: Automatic  Patient Position: Lying  Height and Weight  Height: 5' 7" (170.2 cm)  Height Method: Stated  Weight: 104.3 kg (229 lb 15 oz)  Weight Method: Standard Scale  BSA (Calculated - sq m): 2.22 sq meters  BMI (Calculated): 36  Weight in (lb) to have BMI = 25: 159.3]    Lower extremity compartment soft and warm  No s/s of DVT or infection  Dressing c/d/i  NVI distally    Recent Lab Results         06/11/25  0455   06/10/25  1342   06/10/25  0904   06/10/25  0903        Anion Gap 6.0             BUN 15.1             BUN/CREAT RATIO 19             Calcium 8.5             Chloride 111             CO2 26             Creatinine 0.79             eGFR >60  Comment: Estimated GFR calculated using the CKD-EPI creatinine (2021) equation.             Glucose 138             Hematocrit 30.0     35.1         Hemoglobin 9.4     11.2         MCH 27.6             MCHC 31.3             MCV 88.0             MPV 10.2             nRBC 0.0             Platelet Count 272             POCT Glucose   193     113       Potassium 3.3             RBC 3.41             RDW 12.5             Sodium 143             WBC 8.44                     A/P:    Overall patient doing well.  Therapy for mobility and ambulation.  DVT Ppx  Finish abx   "

## 2025-06-11 NOTE — PLAN OF CARE
Problem: Diabetes Comorbidity  Goal: Blood Glucose Level Within Targeted Range  6/11/2025 1515 by Melissa Vyas, RN  Outcome:  Error  6/11/2025 0949 by Melissa Vyas RN  Outcome: Ongoing

## 2025-06-11 NOTE — DISCHARGE INSTRUCTIONS
[facility]  [address] [place] [address] [address]  [place] [address]  Fax [phone]  SURGEON: Kee Mckeon MD  Phone Number: [phone]    After discharge, all questions or concerns should be handled at your surgeon's office ([phone]). If it is a weekend or after hours, you will get the surgeon on call.     Discharge Medications:    PAIN MANAGEMENT: Next Dose Available   Tylenol/Acetaminophen 500mg- every 4 hours, around the clock (WHILE AWAKE) 6:00pm   Ultram/Tramadol 50mg (Pain Med) - every 4-6 hours AS NEEDED for pain 3:10pm   COMPLICATION PREVENTION MEDS: Next Dose DUE   Plavix 75mg daily - restart home dose    Aspirin 81mg daily for 6 weeks post-op for blood clot prevention PM on 6/11/2025   Miralax 17gm or Senokot S/Ina-Colace 8.6/50mg 2 tablets - once or twice a day while on narcotics and muscle relaxers for constipation prevention PM on 6/11/2025   NOZIN NASAL  - twice a day for 2 weeks or until supply runs out, whichever comes first (Infection prevention) PM on6/11/2025   Duricef/Cefadroxil 500mg (Antibiotic) - twice a day for 7 days post-op for infection prevention   PM on 6/11/2025     Total Knee Replacement                                                                                                                                    PAIN MEDICATIONS/PAIN MANAGEMENT: (Use the medication log in your discharge packet to keep track of your medications)  NO anti-inflammatories (Ibuprofen, Aleve, Motrin, Naproxen, Mobic/Meloxicam, Toradol/Ketorolac, Celebrex, Diclofenac/Voltaren...etc) for 2 weeks or until the wound is healed.    Tylenol/Acetaminophen 500mg every 4 hours, around the clock (WHILE AWAKE).   Take Ultram (Tramadol) 50mg (pain pill) every 4-6 hours as needed for pain.    **NO MORE THAN 3000mg OF TYLENOL IN 24 HOURS**.     **Other things that help with pain control is WALKING, COMPRESSION WRAP, ICE and ELEVATION!!**    BLOOD CLOT PREVENTION:   Aspirin  81mg (blood thinner) - daily for 6 weeks for blood clot prevention. Start on the evening of 06/11/2025. Stop on 7/23/2025   Restart Plavix,  as you were taking Prior to surgery. Start on 6/12/25.   You need to continuing wearing your compression stockings (EVELYN Hose - ThromboEmbolic Disease Prevention Device) for the next 2-6 weeks post-op. It is ok to remove them for hygiene and at bedtime.   Hand wash and Dry. **If the swelling persists in the legs after you stop wearing the EVELYN hose, continue to wear them until the swelling decreases.**  REMOVE STOCKINGS AT LEAST DAILY FOR SKIN ASSESSMENT.   Do NOT let the stockings roll down, creating a tourniquet around the back of your knee or ankle. If you need to, leave the excess at the bottom of the stocking.   The best thing you can do to prevent blood clots is to walk around as much as possible, AT LEAST EVERY 1-2 HOURS.       CONSTIPATION PREVENTION:   Miralax or Senokot S/Ina-Colace and Stool softeners EVERY DAY while on pain meds.  Use other more aggressive over the counter LAXATIVES as needed for constipation (Examples: Milk of Magnesia, Dulcolax tabs or suppository, Magnesium Citrate, Fleet's Enema...etc.)   Drink lots of water.  Increase Fiber in diet.  Increase walking distance each day  DO NOT GO MORE THAN 2 DAYS WITHOUT HAVING A BOWEL MOVEMENT!    ACTIVITY:   Weight bearing precautions as follows:  FULL weight bearing to operative leg with walker.   DO NOT TAKE YOURSELF OFF OF THE WALKER TOO SOON. ALLOW YOUR OUTPATIENT THERAPIST or SURGEON TO GUIDE YOU.   Range of motion as tolerated. Work on BENDING and STRAIGHTENING your knee. Change positions often throughout the day. DO NOT PUT ANYTHING BEHIND THE KNEE, KEEPING IT IN A BENT POSITION.   Walk around at least every 1-2 hours while awake.   No heavy lifting, pulling, pushing or straining. Take it easy!  Outpatient Physical Therapy - Bring prescription to clinic of choice as soon as possible.      SWELLING  PREVENTION AND TREATMENT:  Elevate affected extremity way ABOVE THE LEVEL OF THE HEART to reduce swelling (15-30 minutes at a time, 3 times a day).  Ice the Knee, thigh and lower leg AS MUCH AS POSSIBLE  Compression stockings and ace wrap around the knee and thigh as much as possible. Ok to remove at night for comfort.     WOUND CARE:   Poke hole (Small white bandage) - Dry dressing changes every other day and as needed for soiling for 14 days. Start Friday June 13, 2025.  You may need to change the dressing daily if the wound is draining. Switch to every other day as soon as possible. NOTIFY MD OF EXCESSIVE WOUND DRAINAGE.     Operative Knee Incision - Grey Mepilex 7-day bandage- Do NOT REMOVE until change date 6/18/2025, unless soiled. NO ointments, creams, lotions or antiseptics on the incision. Once removed on the change date, apply a 2nd Mepilex 7-day bandage and leave on for the next 7 days or until you follow-up with your surgeon.       DO NOT TOUCH INCISION(s). DO NOT WET THE INCISION(s). DO NOT apply any ointments, creams, lotions or antiseptics to the incision(s).   Ace wrap - apply your compression stocking to the lower leg and apply the ace wrap where the stocking stops for extra added compression to the knee and thigh.   May wet incision AFTER 2 weeks- (after you follow-up with your surgeon). Ok to shower before then if able to keep wound from getting wet (plastic barrier, saran wrap or cling wrap and tape).       URINARY RETENTION:  If you start having difficulty urinating, decrease the use of Pain pills and muscle relaxers and notify your primary care doctor.     PNEUMONIA PREVENTION:  Stay out of bed as much as possible and walk around every 1-2 hours.  Continue breathing exercises (Incentive Spirometry) every 1-2 hours while mobility is limited and while you are on pain pills.    FALL PREVENTION:  Wear sturdy shoes that fit well - Wearing shoes with high heels or slippery soles, or shoes that are  too loose, can lead to falls. Walking around in bare feet, or only socks, can also increase your risk of falling.  Use walker as long as your surgeon and therapist recommend it  Use good lighting and  throw rugs, electrical cords, furniture and clutter (anything than can cause you to trip at home.   Non-slip rug in bathroom or shower      INFECTION PREVENTION:  NOZIN ANTISEPTIC NASAL  - twice a day for 2 weeks or until supply runs out, whichever comes first. Shake bottle well, saturate cotton swab with 4 drops of antiseptic solution. Swab right nostril rim 6-8 times clockwise and counterclockwise. Take swab out, apply 2 more drops then swab left nostril rim 6-8 times clockwise and counterclockwise.   Duricef/Cefadroxil 500mg (Antibiotic) -  take twice a day for 7 days to prevent infection  Proper handwashing before and after dressing changes. Do not wet the wound. Wound care instructions as written above. NOTIFY MD OF EXCESSIVE WOUND DRAINAGE.  No alcohol, smoking or tobacco products  Pets should not be allowed around the wound or the dressing.   Treat UTI and skin infections as soon as possible.  Pre-medicate with antibiotics prior to dental or surgical procedures.   If you are diabetic, MAINTAIN GOOD BLOOD SUGAR CONTROL (Below 150) DURING YOUR RECOVERY. If you see high numbers, notify your primary care doctor.     Call your SURGEON'S OFFICE ([phone]) if you experience the following signs and symptoms of infection:   Unusual redness, swelling, excessive, cloudy or foul smelling drainage at the incision site.   Persistent low grade temp OR a temp greater than 102 F, unrelieved by Tylenol  Pain at surgical site, unrelieved by pain meds    Warning signs of a blood clot in your leg: (CALL YOUR SURGEON)  New onset or increasing pain in calf, new onset tenderness or redness above or below the knee or increasing swelling of your calf, ankle, or foot.  Warning signs that a blood clot has traveled to your  lungs: (REPORT TO THE ER/CALL 911)  Sudden or increase in Shortness of breath, sudden onset of chest pains, or  Localized chest pain with coughing.       IF ANY ISSUES ARISE AND YOU FEEL THE NEED TO CALL YOUR PRIMARY CARE DOCTOR, PLEASE LET YOUR SURGEON KNOW AS WELL.     For emergencies, please report to OUR ([facility] or [facility] [place]) Emergency department and tell them to call YOUR SURGEON at [phone].     BEFORE MAKING ANY CHANGES TO THE MEDICAL CARE PLAN OR GOING TO THE EMERGENCY ROOM, PLEASE CONTACT THE SURGEON.      After discharge, all questions or concerns should be handled at your surgeon's office ([phone]). If it is a weekend or after hours, you will get the surgeon on call.     Erika Elizondo RN, nurse navigator, available for questions or issues after discharge at [phone].    Patient Education       Total Knee Replacement Discharge Instructions   About this topic   The knee joint is the largest joint in the body and has a number of parts. Cartilage covers parts of the joint in a normal knee. This smooth tissue lets the joint move easily. The cartilage can become worn and cause bone to rub on other bone. This might happen from damage due to wear and tear over time or from an injury. This often leads to pain, stiffness, and trouble walking. Sometimes, drugs and exercises can help you control the pain. When they stop working, you may need knee joint replacement (arthroplasty) surgery.  The doctor replaces your diseased or injured knee joint with a new one. Metal and plastic parts replace your natural knee joint.           American Academy of Orthopaedic Surgeons  http://orthoinfo.aaos.org/topic.cfm?hkxqm=p74397   NHS Choices  http://www.nhs.uk/conditions/knee-replacement/pages/kneereplacementexplained.aspx   Last Reviewed Date   2020-10-12  Consumer Information Use and Disclaimer   This information is not specific medical advice and does not replace information you receive from your health care provider.  This is only a brief summary of general information. It does NOT include all information about conditions, illnesses, injuries, tests, procedures, treatments, therapies, discharge instructions or life-style choices that may apply to you. You must talk with your health care provider for complete information about your health and treatment options. This information should not be used to decide whether or not to accept your health care providers advice, instructions or recommendations. Only your health care provider has the knowledge and training to provide advice that is right for you.   Copyright   Copyright © 2021 [employer], [employer]. and its affiliates and/or licensors. All rights reserved.

## 2025-06-12 ENCOUNTER — PATIENT OUTREACH (OUTPATIENT)
Dept: ADMINISTRATIVE | Facility: CLINIC | Age: 65
End: 2025-06-12
Payer: MEDICARE

## 2025-06-12 ENCOUNTER — TELEPHONE (OUTPATIENT)
Dept: ORTHOPEDICS | Facility: CLINIC | Age: 65
End: 2025-06-12
Payer: MEDICARE

## 2025-06-12 NOTE — TELEPHONE ENCOUNTER
Ortho post op follow up call completed (L TKA) Patient has an outpatient therapy appointment today at 9:30 [facility] [place], wearing EVELYN hose,using ice PRN. Reminded to walk around the house several times daily, elevate left leg higher than heart level 20-30 minutes daily and post op follow up appointment is 6-18 at 9:45 with Meri, verbalized understanding. Encouraged to call with any questions or concerns.

## 2025-06-12 NOTE — PROGRESS NOTES
C3 nurse attempted to contact Barbara Alvarez for a [facility] hospital discharge follow up call. No answer. LVM.  Non [facility] PCP.  The patient does have a post op appt with Kee Mckeon MD (Orthopedic Surgery) on 6/18/2025 @ 9:45.

## 2025-06-12 NOTE — PROGRESS NOTES
C3 nurse spoke with Barbara Alvarez for a [facility] hospital discharge follow up call. [facility].  Patient advised to call and schedule appt within 5-7 days of discharge.  The patient does have a post op appt with Kee Mckeon MD (Orthopedic Surgery) on 6/18/2025 @ 9:45.

## 2025-06-16 NOTE — ANESTHESIA POSTPROCEDURE EVALUATION
Anesthesia Post Evaluation    Patient: Barbara Alvarez    Procedure(s) Performed: Procedure(s) (LRB):  ROBOTIC ARTHROPLASTY, KNEE, TOTAL (Left)    Final Anesthesia Type: spinal      Patient location during evaluation: PACU  Patient participation: Yes- Able to Participate  Level of consciousness: awake and alert  Post-procedure vital signs: reviewed and stable  Pain management: adequate  Airway patency: patent      Anesthetic complications: no      Cardiovascular status: blood pressure returned to baseline  Respiratory status: unassisted  Hydration status: euvolemic  Follow-up not needed.              Vitals Value Taken Time   /75 06/11/25 11:16   Temp 36.4 °C (97.5 °F) 06/11/25 11:16   Pulse 87 06/11/25 11:16   Resp 18 06/11/25 11:16   SpO2 98 % 06/11/25 11:16         Event Time   Out of Recovery 10:33:00         Pain/Leticia Score: No data recorded         1

## 2025-06-18 ENCOUNTER — OFFICE VISIT (OUTPATIENT)
Dept: ORTHOPEDICS | Facility: CLINIC | Age: 65
End: 2025-06-18
Payer: MEDICARE

## 2025-06-18 ENCOUNTER — RESULTS FOLLOW-UP (OUTPATIENT)
Dept: ORTHOPEDICS | Facility: CLINIC | Age: 65
End: 2025-06-18

## 2025-06-18 ENCOUNTER — HOSPITAL ENCOUNTER (OUTPATIENT)
Dept: RADIOLOGY | Facility: HOSPITAL | Age: 65
Discharge: HOME | End: 2025-06-18
Payer: MEDICARE

## 2025-06-18 VITALS
WEIGHT: 229.94 LBS | HEIGHT: 67 IN | SYSTOLIC BLOOD PRESSURE: 126 MMHG | DIASTOLIC BLOOD PRESSURE: 79 MMHG | BODY MASS INDEX: 36.09 KG/M2

## 2025-06-18 DIAGNOSIS — M79.605 LEFT LEG PAIN: ICD-10-CM

## 2025-06-18 DIAGNOSIS — Z96.652 STATUS POST LEFT KNEE REPLACEMENT: ICD-10-CM

## 2025-06-18 DIAGNOSIS — M79.89 LEFT LEG SWELLING: ICD-10-CM

## 2025-06-18 DIAGNOSIS — M17.12 OSTEOARTHRITIS OF LEFT KNEE, UNSPECIFIED OSTEOARTHRITIS TYPE: ICD-10-CM

## 2025-06-18 DIAGNOSIS — Z96.652 STATUS POST LEFT KNEE REPLACEMENT: Primary | ICD-10-CM

## 2025-06-18 PROCEDURE — 93971 EXTREMITY STUDY: CPT | Mod: TC,LT

## 2025-06-18 PROCEDURE — 99024 POSTOP FOLLOW-UP VISIT: CPT | Mod: POP,,,

## 2025-06-18 RX ORDER — TRAMADOL HYDROCHLORIDE 50 MG/1
50 TABLET, FILM COATED ORAL EVERY 4 HOURS PRN
Qty: 42 TABLET | Refills: 0 | Status: SHIPPED | OUTPATIENT
Start: 2025-06-18 | End: 2025-06-25

## 2025-06-18 NOTE — PROGRESS NOTES
Subjective:    CC: Post-op Evaluation of the Left Knee (PO L TKA 6/10/25. Pt states knee is doing ok. Having a lot of pain but states it feels like it's getting better. Still has some swelling. Taking tramadol and tylenol. Ambulating with walker. Has numbness on outside of knee. No other concerns with it. Started PT and has been going well. No other concerns with it.)       History of Present Illness    HPI:  Patient presents for a post-op evaluation approximately one week after left total knee arthroplasty performed on 6/10/25. Her pain is overall improving, though still present. It is located in the back of the knee and calf.  She denies any swelling about the knee. She is currently taking tramadol every four hours and Tylenol for pain management. She is ambulating with a walker and attending PT, which is progressing well, though experiencing pain during sessions. She requests a refill of tramadol. She is continuing Plavix as before surgery, with the addition of one baby aspirin to be continued for six weeks post-surgery due to history of blood clots.    She denies chest pain or shortness of breath.    PREVIOUS TREATMENTS:  Patient is currently attending physical therapy and is progressing well.          ROS: Refer to HPI for pertinent ROS. All other 12 point systems negative.    Past Medical History:   Diagnosis Date    Arthritis     Breast cancer     left    Depression     Digestive disorder     History of blood clots     Hypertension     Lipoma of anterior chest wall         Past Surgical History:   Procedure Laterality Date    APPENDECTOMY      CERVICAL SPINE SURGERY      fusion---over 10 years ago    CHOLECYSTECTOMY      HYSTERECTOMY      KNEE ARTHROSCOPY W/ MENISCAL REPAIR Left     MASTECTOMY, PARTIAL Left 07/22/2024    MASTECTOMY, PARTIAL - left  w/ radiological marker; MD Rebekah;    PLACEMENT, INFERIOR VENA CAVA FILTER Left 2015    ROBOTIC ARTHROPLASTY, KNEE Left 6/10/2025    Procedure: ROBOTIC  ARTHROPLASTY, KNEE, TOTAL;  Surgeon: Kee Mckeon MD;  Location: [facility];  Service: Orthopedics;  Laterality: Left;    SHOULDER ARTHROSCOPY W/ ROTATOR CUFF REPAIR Left     STENT PLACEMENT/PRIOR TO CALIN Left 2015    2 stents in lt groin        Medications Ordered Prior to Encounter[1]     Review of patient's allergies indicates:   Allergen Reactions    Amitriptyline Other (See Comments)     Other Reaction(s): muscle tightening    MUSCLE WEAKNESS    Wellbutrin [bupropion hcl] Hallucinations    Adhesive Rash    Anastrozole Nausea And Vomiting and Other (See Comments)     Dizziness    Enoxaparin Rash    Methocarbamol Rash and Other (See Comments)     MUSCLE WEAKNESS    Rivaroxaban Rash    Warfarin Rash       Objective:    Vitals:    06/18/25 0948   BP: 126/79        Physical Exam:  Left lower extremity compartments are soft and warm.  There are no signs or symptoms of DVT or infection.Mepilex in place without drainage. C/d/I. Knee ROM is 0-90 degrees today.  The patient is appropriately tender to palpation about the knee. The patella is tracking appropriately. The knee is stable to stressing. Endorses calf pain, mild swellling. + homans.Neurovascularly intact distally.      X-rays: 3 views of the left knee demonstrate a well aligned total knee arthroplasty without evidence of loosening or infection.  t.    Assessment:  1. Status post left knee replacement  - X-Ray Knee 3 View Left; Future  - traMADoL (ULTRAM) 50 mg tablet; Take 1 tablet (50 mg total) by mouth every 4 (four) hours as needed for Pain.  Dispense: 42 tablet; Refill: 0  - US Lower Extremity Veins Left; Future    2. Osteoarthritis of left knee, unspecified osteoarthritis type  - traMADoL (ULTRAM) 50 mg tablet; Take 1 tablet (50 mg total) by mouth every 4 (four) hours as needed for Pain.  Dispense: 42 tablet; Refill: 0    3. Left leg pain  - US Lower Extremity Veins Left; Future    4. Left leg swelling  - US Lower Extremity Veins Left; Future       Plan:   Assessment & Plan    KNEE PAIN AND ARTIFICIAL JOINT:   Ordered ultrasound of the lower extremity at [facility] to rule out blood clots given her history and exam today.    Leave current dressing on until returning home.   Change dressing at home using supplies provided.   Follow up in 1 week for staple removal.  -Continue PT    PERSONAL HISTORY OF DVT with Recurrence   Ordered ultrasound of the lower extremity at [facility] to rule out blood clots. Apt @ 1pm. Present to ER sooner if develops increasing calf pain, CP, SOB.  -Continue plavix and ASA 81mg daily; has IVC filter 2/2 intolerance of DOACS    LONG TERM USE OF ANTITHROMBOTICS/ANTIPLATELETS:   Refill tramadol.          Follow up: No follow-ups on file.          This note was generated with the assistance of ambient listening technology. Verbal consent was obtained by the patient and accompanying visitor(s) for the recording of patient appointment to facilitate this note. I attest to having reviewed and edited the generated note for accuracy, though some syntax or spelling errors may persist. Please contact the author of this note for any clarification.            [1]   Current Outpatient Medications on File Prior to Visit   Medication Sig Dispense Refill    acetaminophen (TYLENOL) 500 MG tablet Take 1 tablet (500 mg total) by mouth every 4 (four) hours. for 14 days 84 tablet 0    amLODIPine (NORVASC) 10 MG tablet Take 10 mg by mouth nightly.      aspirin 81 MG Chew Take 1 tablet (81 mg total) by mouth once daily. Blood clot prevention 42 tablet 0    calcium carbonate (CALCIUM 500 ORAL) Take 1 tablet by mouth nightly.      cefadroxil (DURICEF) 500 MG Cap Take 1 capsule (500 mg total) by mouth every 12 (twelve) hours. for 7 days 14 capsule 0    clopidogreL (PLAVIX) 75 mg tablet Take 75 mg by mouth once daily.      [Paused] diclofenac sodium (VOLTAREN) 1 % Gel Apply topically as needed. (Patient not taking: Reported on 6/12/2025)       furosemide (LASIX) 40 MG tablet Take 40 mg by mouth 2 (two) times a day.      irbesartan (AVAPRO) 300 MG tablet Take 300 mg by mouth every evening.      mupirocin (BACTROBAN) 2 % ointment Apply one application to bilateral nostrils twice a day for 5 days. 30 g 0    polyethylene glycol (GLYCOLAX) 17 gram PwPk Take 17 g by mouth once daily. Constipation PREVENTION for 14 days 14 each 0    [DISCONTINUED] traMADoL (ULTRAM) 50 mg tablet Take 1 tablet (50 mg total) by mouth every 4 to 6 hours as needed for Pain. 42 tablet 0     No current facility-administered medications on file prior to visit.

## 2025-06-25 ENCOUNTER — OFFICE VISIT (OUTPATIENT)
Dept: ORTHOPEDICS | Facility: CLINIC | Age: 65
End: 2025-06-25
Payer: MEDICARE

## 2025-06-25 VITALS
BODY MASS INDEX: 36.09 KG/M2 | HEIGHT: 67 IN | DIASTOLIC BLOOD PRESSURE: 76 MMHG | HEART RATE: 83 BPM | SYSTOLIC BLOOD PRESSURE: 128 MMHG | WEIGHT: 229.94 LBS

## 2025-06-25 DIAGNOSIS — Z51.89 AFTERCARE: Primary | ICD-10-CM

## 2025-06-25 DIAGNOSIS — Z96.652 STATUS POST LEFT KNEE REPLACEMENT: ICD-10-CM

## 2025-06-25 DIAGNOSIS — M17.12 OSTEOARTHRITIS OF LEFT KNEE, UNSPECIFIED OSTEOARTHRITIS TYPE: ICD-10-CM

## 2025-06-25 PROCEDURE — 99024 POSTOP FOLLOW-UP VISIT: CPT | Mod: POP,,,

## 2025-06-25 RX ORDER — TRAMADOL HYDROCHLORIDE 50 MG/1
50 TABLET, FILM COATED ORAL EVERY 6 HOURS PRN
Qty: 28 TABLET | Refills: 0 | Status: SHIPPED | OUTPATIENT
Start: 2025-06-25 | End: 2025-07-02

## 2025-06-25 NOTE — PROGRESS NOTES
Subjective:    CC: Post-op Evaluation of the Left Knee (PO L TKA 6/10/25. Pt states knee has been good. Hurts some days more than others. Wearing compression socks, still has swelling. Taking tramadol and tylenol. PT is going good and has been helping. Ambulating with walker, mobility is good. No drainage or oozing that she's aware of. No other concerns with it. )       History of Present Illness    HPI:  Patient returns for wound check and staple removal following recent knee surgery. She reports doing well overall with occasional swelling. Pain is controlled with Tramadol every four to six hours and Tylenol. She wears compression stockings as instructed and ambulates with a walker, feeling her mobility is improving. PT is progressing well and has been beneficial. She continues to take Plavix and aspirin as prescribed. 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 She is currently out of work as a , with school set to start on August 2nd.    She denies any formal medical diagnoses.    PREVIOUS TREATMENTS:  Patient underwent staple removal during this visit. She is currently undergoing physical therapy, which is providing benefit. She has been wearing compression stockings as instructed and using a walker for ambulation.          ROS: Refer to HPI for pertinent ROS. All other 12 point systems negative.    Past Medical History:   Diagnosis Date    Arthritis     Breast cancer     left    Depression     Digestive disorder     History of blood clots     Hypertension     Lipoma of anterior chest wall         Past Surgical History:   Procedure Laterality Date    APPENDECTOMY      CERVICAL SPINE SURGERY      fusion---over 10 years ago    CHOLECYSTECTOMY      HYSTERECTOMY      KNEE ARTHROSCOPY W/ MENISCAL REPAIR Left     MASTECTOMY, PARTIAL Left 07/22/2024    MASTECTOMY, PARTIAL - left  w/ radiological marker; MD Rebekah;    PLACEMENT, INFERIOR VENA CAVA FILTER Left 2015    ROBOTIC  ARTHROPLASTY, KNEE Left 6/10/2025    Procedure: ROBOTIC ARTHROPLASTY, KNEE, TOTAL;  Surgeon: Kee Mckeon MD;  Location: [facility];  Service: Orthopedics;  Laterality: Left;    SHOULDER ARTHROSCOPY W/ ROTATOR CUFF REPAIR Left     STENT PLACEMENT/PRIOR TO CALIN Left 2015    2 stents in lt groin        Medications Ordered Prior to Encounter[1]     Review of patient's allergies indicates:   Allergen Reactions    Amitriptyline Other (See Comments)     Other Reaction(s): muscle tightening    MUSCLE WEAKNESS    Wellbutrin [bupropion hcl] Hallucinations    Adhesive Rash    Anastrozole Nausea And Vomiting and Other (See Comments)     Dizziness    Enoxaparin Rash    Methocarbamol Rash and Other (See Comments)     MUSCLE WEAKNESS    Rivaroxaban Rash    Warfarin Rash       Objective:    Vitals:    06/25/25 1425   BP: 128/76   Pulse: 83        Physical Exam:  Left lower extremity compartments are soft and warm.  There are no signs or symptoms of DVT or infection. Incisions are well-healed. Staples have been removed and steri strips applied. Knee ROM is 0-90 degrees today.  The patient is appropriately tender to palpation about the knee and calf. Negative homans. The patella is tracking appropriately. The knee is stable to stressing. Neurovascularly intact distally.       Images:  previous Images Reviewed and discussed with patient.    Assessment:  1. Aftercare    2. Status post left knee replacement  - traMADoL (ULTRAM) 50 mg tablet; Take 1 tablet (50 mg total) by mouth every 6 (six) hours as needed for Pain.  Dispense: 28 tablet; Refill: 0    3. Osteoarthritis of left knee, unspecified osteoarthritis type  - traMADoL (ULTRAM) 50 mg tablet; Take 1 tablet (50 mg total) by mouth every 6 (six) hours as needed for Pain.  Dispense: 28 tablet; Refill: 0       Plan:  Assessment & Plan    LEFT KNEE PAIN AND ARTIFICIAL KNEE JOINT:   Removed staples from patient's surgical site.   Applied steri-strips to keep incision lined up after  staple removal.   Perform knee flexion exercises on non-therapy days, bringing foot back as far as tolerated without hiking up hip.   Shower and get wound wet with water and simple soap, pat dry.   No need to keep wound covered unless going to unsanitary places.   Can start driving when off pain medication and able to safely operate vehicle, including slamming on brakes.    PAIN MANAGEMENT:   Refilled Tramadol prescription.   Call office by Thursday at the latest if pain medication refill is needed.    EMPLOYMENT STATUS:   Will reassess ability to return to work as  at next visit.    FOLLOW-UP:   Follow up in 4 weeks.          Follow up: Follow up in about 4 weeks (around 7/23/2025).          This note was generated with the assistance of ambient listening technology. Verbal consent was obtained by the patient and accompanying visitor(s) for the recording of patient appointment to facilitate this note. I attest to having reviewed and edited the generated note for accuracy, though some syntax or spelling errors may persist. Please contact the author of this note for any clarification.            [1]   Current Outpatient Medications on File Prior to Visit   Medication Sig Dispense Refill    acetaminophen (TYLENOL) 500 MG tablet Take 1 tablet (500 mg total) by mouth every 4 (four) hours. for 14 days 84 tablet 0    amLODIPine (NORVASC) 10 MG tablet Take 10 mg by mouth nightly.      aspirin 81 MG Chew Take 1 tablet (81 mg total) by mouth once daily. Blood clot prevention 42 tablet 0    calcium carbonate (CALCIUM 500 ORAL) Take 1 tablet by mouth nightly.      clopidogreL (PLAVIX) 75 mg tablet Take 75 mg by mouth once daily.      diclofenac sodium (VOLTAREN) 1 % Gel Apply topically as needed. (Patient not taking: Reported on 6/12/2025)      furosemide (LASIX) 40 MG tablet Take 40 mg by mouth 2 (two) times a day.      irbesartan (AVAPRO) 300 MG tablet Take 300 mg by mouth every evening.      mupirocin  (BACTROBAN) 2 % ointment Apply one application to bilateral nostrils twice a day for 5 days. 30 g 0    polyethylene glycol (GLYCOLAX) 17 gram PwPk Take 17 g by mouth once daily. Constipation PREVENTION for 14 days 14 each 0    [DISCONTINUED] traMADoL (ULTRAM) 50 mg tablet Take 1 tablet (50 mg total) by mouth every 4 (four) hours as needed for Pain. 42 tablet 0     No current facility-administered medications on file prior to visit.

## 2025-07-03 ENCOUNTER — HOSPITAL ENCOUNTER (EMERGENCY)
Facility: HOSPITAL | Age: 65
Discharge: HOME | End: 2025-07-03
Attending: FAMILY MEDICINE
Payer: MEDICARE

## 2025-07-03 VITALS
HEART RATE: 92 BPM | BODY MASS INDEX: 31.39 KG/M2 | TEMPERATURE: 98 F | SYSTOLIC BLOOD PRESSURE: 151 MMHG | WEIGHT: 200 LBS | HEIGHT: 67 IN | DIASTOLIC BLOOD PRESSURE: 70 MMHG | OXYGEN SATURATION: 97 % | RESPIRATION RATE: 20 BRPM

## 2025-07-03 DIAGNOSIS — L02.91 ABSCESS: Primary | ICD-10-CM

## 2025-07-03 PROCEDURE — 99283 EMERGENCY DEPT VISIT LOW MDM: CPT | Mod: 25

## 2025-07-03 PROCEDURE — 25000003 PHARM REV CODE 250: Performed by: PHYSICIAN ASSISTANT

## 2025-07-03 PROCEDURE — 63600175 PHARM REV CODE 636 W HCPCS: Performed by: PHYSICIAN ASSISTANT

## 2025-07-03 PROCEDURE — 10060 I&D ABSCESS SIMPLE/SINGLE: CPT

## 2025-07-03 RX ORDER — SULFAMETHOXAZOLE AND TRIMETHOPRIM 800; 160 MG/1; MG/1
1 TABLET ORAL 2 TIMES DAILY
Qty: 20 TABLET | Refills: 0 | Status: SHIPPED | OUTPATIENT
Start: 2025-07-03 | End: 2025-07-03

## 2025-07-03 RX ORDER — LIDOCAINE HYDROCHLORIDE 10 MG/ML
5 INJECTION, SOLUTION EPIDURAL; INFILTRATION; INTRACAUDAL; PERINEURAL
Status: COMPLETED | OUTPATIENT
Start: 2025-07-03 | End: 2025-07-03

## 2025-07-03 RX ORDER — SULFAMETHOXAZOLE AND TRIMETHOPRIM 800; 160 MG/1; MG/1
1 TABLET ORAL
Status: COMPLETED | OUTPATIENT
Start: 2025-07-03 | End: 2025-07-03

## 2025-07-03 RX ORDER — SULFAMETHOXAZOLE AND TRIMETHOPRIM 800; 160 MG/1; MG/1
1 TABLET ORAL 2 TIMES DAILY
Qty: 20 TABLET | Refills: 0 | Status: SHIPPED | OUTPATIENT
Start: 2025-07-03 | End: 2025-07-13

## 2025-07-03 RX ADMIN — SULFAMETHOXAZOLE AND TRIMETHOPRIM 1 TABLET: 800; 160 TABLET ORAL at 06:07

## 2025-07-03 RX ADMIN — LIDOCAINE HYDROCHLORIDE 50 MG: 10 INJECTION, SOLUTION EPIDURAL; INFILTRATION; INTRACAUDAL; PERINEURAL at 06:07

## 2025-07-03 NOTE — ED PROVIDER NOTES
Encounter Date: 7/3/2025       History     Chief Complaint   Patient presents with    Abscess     Pt c/o abscess to R groin area x3 days that is increasing in size & pain; denies drainage     Patient presents to ER today with a complaint of possible abscess to her right groin area.  This has been ongoing for the past 3 days.  No fever.    The history is provided by the patient.     Review of patient's allergies indicates:   Allergen Reactions    Amitriptyline Other (See Comments)     Other Reaction(s): muscle tightening    MUSCLE WEAKNESS    Wellbutrin [bupropion hcl] Hallucinations    Adhesive Rash    Anastrozole Nausea And Vomiting and Other (See Comments)     Dizziness    Enoxaparin Rash    Methocarbamol Rash and Other (See Comments)     MUSCLE WEAKNESS    Rivaroxaban Rash    Warfarin Rash     Past Medical History:   Diagnosis Date    Arthritis     Breast cancer     left    Depression     Digestive disorder     History of blood clots     Hypertension     Lipoma of anterior chest wall      Past Surgical History:   Procedure Laterality Date    APPENDECTOMY      CERVICAL SPINE SURGERY      fusion---over 10 years ago    CHOLECYSTECTOMY      HYSTERECTOMY      KNEE ARTHROSCOPY W/ MENISCAL REPAIR Left     MASTECTOMY, PARTIAL Left 07/22/2024    MASTECTOMY, PARTIAL - left  w/ radiological marker; MD Rebekah;    PLACEMENT, INFERIOR VENA CAVA FILTER Left 2015    ROBOTIC ARTHROPLASTY, KNEE Left 6/10/2025    Procedure: ROBOTIC ARTHROPLASTY, KNEE, TOTAL;  Surgeon: Kee Mckeon MD;  Location: [facility];  Service: Orthopedics;  Laterality: Left;    SHOULDER ARTHROSCOPY W/ ROTATOR CUFF REPAIR Left     STENT PLACEMENT/PRIOR TO CALIN Left 2015    2 stents in lt groin     Family History   Problem Relation Name Age of Onset    Cervical cancer Mother      Diabetes Mother      Hypertension Mother      Prostate cancer Father  79    Prostate cancer Brother  68    Hypertension Brother      Brain cancer Child  6    Cancer Maternal Uncle       Cancer Maternal Uncle      Cancer Maternal Uncle       Social History[1]  Review of Systems   Genitourinary:         Abscess right groin   Skin:         Abscess right mons pubis   All other systems reviewed and are negative.      Physical Exam     Initial Vitals [07/03/25 1745]   BP Pulse Resp Temp SpO2   (!) 151/70 92 20 98.3 °F (36.8 °C) 97 %      MAP       --         Physical Exam    Nursing note and vitals reviewed.  Constitutional: She appears well-developed and well-nourished.   HENT:   Head: Normocephalic and atraumatic.   Eyes: Conjunctivae and EOM are normal. Pupils are equal, round, and reactive to light.   Neck: Neck supple.   Normal range of motion.  Genitourinary:    Genitourinary Comments: Abscess noted to right mons pubis.  Induration, fluctuance noted.  No drainage.  Tender to palpate     Musculoskeletal:         General: Normal range of motion.      Cervical back: Normal range of motion and neck supple.     Neurological: She is alert and oriented to person, place, and time. She has normal strength.   Skin: Skin is warm and dry. Capillary refill takes less than 2 seconds.   Psychiatric: She has a normal mood and affect. Her behavior is normal. Judgment and thought content normal.         ED Course   I & D - Incision and Drainage    Date/Time: 7/3/2025 6:00 PM  Location procedure was performed: [facility] EMERGENCY DEPARTMENT    Performed by: Sara Escobar PA  Authorized by: Bennett Oneil MD  Pre-operative diagnosis: Abscess right mons pubis  Post-operative diagnosis: Abscess right mons pubis  Consent Done: Emergent Situation  Type: abscess  Location: Right mons pubis.  Anesthesia: local infiltration    Anesthesia:  Local Anesthetic: lidocaine 1% without epinephrine  Anesthetic total: 2 mL    Patient sedated: no  Description of findings: 1.5 cm abscess noted right mons pubis.  Induration, fluctuance noted.  Tender to palpate.  No drainage   Scalpel size: 11  Incision type: single  straight  Incision depth: dermal  Complexity: simple  Drainage: pus and bloody  Drainage amount: moderate  Wound treatment: wound packed  Packing material: Packed with 1 in gauze.  Complications: No  Specimens: No  Implants: No  Comments: Wound packed with 1 in iodoform gauze.  Dressed with 4 x 4 and tape.  Patient tolerated well.  Patient was instructed to remove the packing in 2 days.    Incision depth: dermal        Labs Reviewed - No data to display       Imaging Results    None          Medications   sulfamethoxazole-trimethoprim 800-160mg per tablet 1 tablet (has no administration in time range)   LIDOcaine (PF) 10 mg/ml (1%) injection 50 mg (50 mg Infiltration Given 7/3/25 1802)     Medical Decision Making  Abscess, cellulitis, folliculitis, lymphadenopathy    Risk  Prescription drug management.  Risk Details: Patient was instructed                                      Clinical Impression:  Final diagnoses:  [L02.91] Abscess (Primary)          ED Disposition Condition    Discharge Stable          ED Prescriptions       Medication Sig Dispense Start Date End Date Auth. Provider    sulfamethoxazole-trimethoprim 800-160mg (BACTRIM DS) 800-160 mg Tab  (Status: Discontinued) Take 1 tablet by mouth 2 (two) times daily. for 10 days 20 tablet 7/3/2025 7/3/2025 Sara Escobar PA    sulfamethoxazole-trimethoprim 800-160mg (BACTRIM DS) 800-160 mg Tab Take 1 tablet by mouth 2 (two) times daily. for 10 days 20 tablet 7/3/2025 7/13/2025 Sara Escobar PA          Follow-up Information       Follow up With Specialties Details Why Contact Info    Antoinette Elizondo NP Internal Medicine  3-4 days [address] [facility] [address]  [phone]                     [1]   Social History  Tobacco Use    Smoking status: Never    Smokeless tobacco: Never   Substance Use Topics    Alcohol use: Never    Drug use: Never        Sara Escobar PA  07/03/25 2116

## 2025-07-03 NOTE — DISCHARGE INSTRUCTIONS
Take the Bactrim DS twice a day for 10 days.  Follow up with your PCP on Monday.  Return to the emergency room for worsening symptoms or condition.  The packing needs to be removed in 2 days

## 2025-07-23 ENCOUNTER — OFFICE VISIT (OUTPATIENT)
Dept: ORTHOPEDICS | Facility: CLINIC | Age: 65
End: 2025-07-23
Payer: MEDICARE

## 2025-07-23 DIAGNOSIS — Z96.652 STATUS POST LEFT KNEE REPLACEMENT: Primary | ICD-10-CM

## 2025-07-23 PROCEDURE — 99024 POSTOP FOLLOW-UP VISIT: CPT | Mod: POP,,,

## 2025-07-23 NOTE — LETTER
[address]  Phone: [phone]  Fax: [phone]      Patient Name: Barbara Alvarez  YOB: 1960    Date: 7/23/2025      The above mentioned patient was seen by me on 7/23/2025.     From an orthopedic standpoint, after left total knee replacement, patient is okay to drive. She is no longer taking narcotic pain medication and can safely brake with her right leg.       Kee Mckeon MD / Meri Garcia PA-C    .

## 2025-07-23 NOTE — PROGRESS NOTES
Subjective:    CC: Follow-up of the Left Knee (F/U L TKA 6/10/25. Pt states knee is doing ok. Still having some pain and swelling. Has numbness on the lateral side. ROM has gotten better, still has difficulty bending it up to a certain point. Not taking Tramadol, taking tylenol PRN. Still in PT and it has been helping.)       History of Present Illness    HPI:  Patient presents for follow-up approximately six weeks after knee replacement. She reports that pain is not as well controlled as before. She has been attending PT and taking Tylenol as needed for pain management. She is on daily Plavix and was instructed to stop the aspirin that was added post-surgery.    She expresses concern about her ability to bend, particularly for her upcoming physical exam as a . She cannot bend to the floor, clarifying that she means squatting. She reports being able to bend past 100 degrees in PT, with range of motion improving after completing all exercises. She agrees to continue with PT to improve her range of motion.    She denies calf pain when squeezed.    PREVIOUS TREATMENTS:  Patient underwent a left knee replacement approximately 6 weeks ago. She is currently undergoing physical therapy, which is ongoing and improving her range of motion.          ROS: Refer to HPI for pertinent ROS. All other 12 point systems negative.    Past Medical History:   Diagnosis Date    Arthritis     Breast cancer     left    Depression     Digestive disorder     History of blood clots     Hypertension     Lipoma of anterior chest wall         Past Surgical History:   Procedure Laterality Date    APPENDECTOMY      CERVICAL SPINE SURGERY      fusion---over 10 years ago    CHOLECYSTECTOMY      HYSTERECTOMY      KNEE ARTHROSCOPY W/ MENISCAL REPAIR Left     MASTECTOMY, PARTIAL Left 07/22/2024    MASTECTOMY, PARTIAL - left  w/ radiological marker; MD Rebekah;    PLACEMENT, INFERIOR VENA CAVA FILTER Left 2015    ROBOTIC ARTHROPLASTY, KNEE  Left 6/10/2025    Procedure: ROBOTIC ARTHROPLASTY, KNEE, TOTAL;  Surgeon: Kee Mckeon MD;  Location: [facility];  Service: Orthopedics;  Laterality: Left;    SHOULDER ARTHROSCOPY W/ ROTATOR CUFF REPAIR Left     STENT PLACEMENT/PRIOR TO CALIN Left 2015    2 stents in lt groin        Medications Ordered Prior to Encounter[1]     Review of patient's allergies indicates:   Allergen Reactions    Amitriptyline Other (See Comments)     Other Reaction(s): muscle tightening    MUSCLE WEAKNESS    Wellbutrin [bupropion hcl] Hallucinations    Adhesive Rash    Anastrozole Nausea And Vomiting and Other (See Comments)     Dizziness    Enoxaparin Rash    Methocarbamol Rash and Other (See Comments)     MUSCLE WEAKNESS    Rivaroxaban Rash    Warfarin Rash       Objective:    There were no vitals filed for this visit.     Physical Exam:  Left lower extremity compartments are soft and warm.  There are no signs or symptoms of DVT or infection. Incisions are well-healed.  Knee ROM is 0-105 degrees today.  The patient is non tender to palpation about the knee. The patella is tracking appropriately. The knee is stable to stressing. Neurovascularly intact distally.    Images:  Previous Images Reviewed and discussed with patient.    Assessment:  1. Status post left knee replacement       Plan:  Assessment & Plan    LEFT KNEE STIFFNESS AND ARTIFICIAL JOINT:   Continue PT for knee rehabilitation.   Follow up in 2 months to assess knee progress.   Take Tylenol as needed for pain.   Attempt to perform physical exam for work, but prioritize safety.    ANTITHROMBOTIC/ANTIPLATELET USE:   Continue Plavix daily as prior to sx.     ASPIRIN USE:   Stop aspirin that was added post-surgery.    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          Follow up: Follow up in about 8 weeks (around 9/17/2025).          This note was generated with the assistance of ambient listening  technology. Verbal consent was obtained by the patient and accompanying visitor(s) for the recording of patient appointment to facilitate this note. I attest to having reviewed and edited the generated note for accuracy, though some syntax or spelling errors may persist. Please contact the author of this note for any clarification.            [1]   Current Outpatient Medications on File Prior to Visit   Medication Sig Dispense Refill    amLODIPine (NORVASC) 10 MG tablet Take 10 mg by mouth nightly.      calcium carbonate (CALCIUM 500 ORAL) Take 1 tablet by mouth nightly.      clopidogreL (PLAVIX) 75 mg tablet Take 75 mg by mouth once daily.      diclofenac sodium (VOLTAREN) 1 % Gel Apply topically as needed. (Patient not taking: Reported on 6/12/2025)      furosemide (LASIX) 40 MG tablet Take 40 mg by mouth 2 (two) times a day.      irbesartan (AVAPRO) 300 MG tablet Take 300 mg by mouth every evening.      mupirocin (BACTROBAN) 2 % ointment Apply one application to bilateral nostrils twice a day for 5 days. 30 g 0    [DISCONTINUED] aspirin 81 MG Chew Take 1 tablet (81 mg total) by mouth once daily. Blood clot prevention 42 tablet 0     No current facility-administered medications on file prior to visit.

## (undated) DEVICE — SPONGE COTTON TRAY 4X4IN

## (undated) DEVICE — GLOVE SENSICARE PI GRN 8.5

## (undated) DEVICE — SOL IRRI STRL WATER 1000ML

## (undated) DEVICE — GOWN POLY REINF X-LONG 2XL

## (undated) DEVICE — APPLICATOR CHLORAPREP ORN 26ML

## (undated) DEVICE — KIT TRIATHLON CR FEM PREP SZ3

## (undated) DEVICE — SOL NACL IRR 3000ML

## (undated) DEVICE — APPLIER CLIP LIAGCLIP 9.375IN

## (undated) DEVICE — HANDLE DEVON RIGID OR LIGHT

## (undated) DEVICE — GLOVE SENSICARE PI MICRO 6

## (undated) DEVICE — DRAPE STERI U-SHAPED 47X51IN

## (undated) DEVICE — BLANKET SNUGGLE WARM LOWER BDY

## (undated) DEVICE — COVER PROXIMA MAYO STAND

## (undated) DEVICE — DRAPE FULL SHEET 70X100IN

## (undated) DEVICE — PIN BONE 3.2X110MM
Type: IMPLANTABLE DEVICE | Site: KNEE | Status: NON-FUNCTIONAL
Removed: 2025-06-10

## (undated) DEVICE — BLADE SURG STAINLESS STEEL #15

## (undated) DEVICE — CUFF ATS 2 PORT SNGL BLDR 34IN

## (undated) DEVICE — SYR 30CC LUER LOCK

## (undated) DEVICE — ELECTRODE PATIENT RETURN DISP

## (undated) DEVICE — DRESSING TRANS 4X4 TEGADERM

## (undated) DEVICE — BLADE MAKO STANDARD

## (undated) DEVICE — KIT SURGICAL TURNOVER

## (undated) DEVICE — DRESSING XEROFORM NONADH 1X8IN

## (undated) DEVICE — SUT ETHIBOND EXCEL 1 CTX 18

## (undated) DEVICE — SUT MCRYL PLUS 3-0 PS2 27IN

## (undated) DEVICE — SUPPORT ULNA NERVE PROTECTOR

## (undated) DEVICE — DRAPE MEDI-SLUSH 44X44IN

## (undated) DEVICE — SYR 10CC LUER LOCK

## (undated) DEVICE — GLOVE SENSICARE PI GRN 6.5

## (undated) DEVICE — Device

## (undated) DEVICE — NDL ECLIPSE SAFETY 18GX1-1/2IN

## (undated) DEVICE — PIN BONE 4 X 140MM STERILE
Type: IMPLANTABLE DEVICE | Site: KNEE | Status: NON-FUNCTIONAL
Removed: 2025-06-10

## (undated) DEVICE — STRIP MEDI WND CLSR 1/2X4IN

## (undated) DEVICE — KIT VIZADISC KNEE TRACKING

## (undated) DEVICE — COVER TABLE HVY DTY 60X90IN

## (undated) DEVICE — BRA MARENA B 38-40 LG BEIGE

## (undated) DEVICE — SUT VICRYL PLUS 2-0 CT1 18

## (undated) DEVICE — SOL POVIDONE IODINE PCH 3/4OZ

## (undated) DEVICE — NDL HYPO REG 25G X 1 1/2

## (undated) DEVICE — GLOVE SIGNATURE ESSNTL LTX 6.5

## (undated) DEVICE — TAPE SILK 3IN

## (undated) DEVICE — SUT STRATAFIX 4-0 30CM PS-2

## (undated) DEVICE — PADDING WYTEX UNDRCST 6INX4YD

## (undated) DEVICE — WRAP DEMAYO LEG STERILE

## (undated) DEVICE — CUSHION  WC FOAM 20X20X.75IN

## (undated) DEVICE — MARGIN MARKER STANDARD 6 COLOR

## (undated) DEVICE — KIT DRAPE RIO ONE PIECE W/POCK

## (undated) DEVICE — BLADE SAG DUAL CUT 18X90X1.35

## (undated) DEVICE — BLADE SURG STAINLESS STEEL #10

## (undated) DEVICE — SYS CARTRIDGE MIXI PRISM II

## (undated) DEVICE — DRAPE MEDIUM SHEET 40X70IN

## (undated) DEVICE — ILLUMINATOR PHOTONBLADE 8/11IN

## (undated) DEVICE — GLOVE SENSICARE PI MICRO 8

## (undated) DEVICE — SPONGE LAP 18X18 PREWASHED

## (undated) DEVICE — CHARM MARGINMAP SPEC 5MM

## (undated) DEVICE — KIT TRIATHLON CR TIB PREP SZ4

## (undated) DEVICE — PAD PREP CUFFED NS 24X48IN

## (undated) DEVICE — GLOVE 6.0 PROTEXIS PI MICRO

## (undated) DEVICE — BANDAGE COMPR 6IN 5.8YD

## (undated) DEVICE — DEVICE STRATAFIX SYMMETRIC +

## (undated) DEVICE — KIT CHECKPOINT TIBIAL

## (undated) DEVICE — NDL SYR 10ML 18X1.5 LL BLUNT

## (undated) DEVICE — PAD ABDOMINAL STERILE 8X10IN

## (undated) DEVICE — SOL NACL IRR 1000ML BTL

## (undated) DEVICE — SYR 3ML LL 18GA 1.5IN

## (undated) DEVICE — SUT SILK 3-0 BLK BR SH 30IN